# Patient Record
Sex: MALE | Race: WHITE | Employment: UNEMPLOYED | URBAN - METROPOLITAN AREA
[De-identification: names, ages, dates, MRNs, and addresses within clinical notes are randomized per-mention and may not be internally consistent; named-entity substitution may affect disease eponyms.]

---

## 2018-02-20 RX ORDER — OLANZAPINE 2.5 MG/1
2.5 TABLET ORAL NIGHTLY
COMMUNITY
End: 2018-08-21

## 2018-02-20 RX ORDER — LANOLIN ALCOHOL/MO/W.PET/CERES
3 CREAM (GRAM) TOPICAL NIGHTLY PRN
COMMUNITY
End: 2018-08-21

## 2018-02-20 RX ORDER — CHOLECALCIFEROL (VITAMIN D3) 125 MCG
2000 CAPSULE ORAL EVERY MORNING
COMMUNITY
End: 2019-08-16

## 2018-02-20 RX ORDER — DESVENLAFAXINE 100 MG/1
100 TABLET, EXTENDED RELEASE ORAL DAILY
COMMUNITY
End: 2018-08-21

## 2018-02-20 RX ORDER — AMLODIPINE BESYLATE 5 MG/1
5 TABLET ORAL DAILY
Status: ON HOLD | COMMUNITY
End: 2019-01-14 | Stop reason: HOSPADM

## 2018-02-20 RX ORDER — PROMETHAZINE HYDROCHLORIDE 25 MG/1
25 TABLET ORAL EVERY 8 HOURS PRN
COMMUNITY
End: 2018-03-01 | Stop reason: ALTCHOICE

## 2018-02-20 RX ORDER — ACETAMINOPHEN 325 MG/1
650 TABLET ORAL 4 TIMES DAILY
COMMUNITY

## 2018-02-20 RX ORDER — MIRTAZAPINE 15 MG/1
30 TABLET, FILM COATED ORAL NIGHTLY
Status: ON HOLD | COMMUNITY
End: 2019-06-21

## 2018-02-20 RX ORDER — ACETAMINOPHEN 650 MG/1
650 SUPPOSITORY RECTAL EVERY 4 HOURS PRN
Status: ON HOLD | COMMUNITY
End: 2019-01-14 | Stop reason: HOSPADM

## 2018-02-23 ENCOUNTER — OFFICE VISIT (OUTPATIENT)
Dept: GERIATRIC MEDICINE | Age: 63
End: 2018-02-23
Payer: COMMERCIAL

## 2018-02-23 DIAGNOSIS — E11.9 TYPE 2 DIABETES MELLITUS WITHOUT COMPLICATION, UNSPECIFIED LONG TERM INSULIN USE STATUS: ICD-10-CM

## 2018-02-23 DIAGNOSIS — G89.29 OTHER CHRONIC PAIN: Primary | ICD-10-CM

## 2018-02-23 DIAGNOSIS — I10 ESSENTIAL HYPERTENSION: ICD-10-CM

## 2018-02-23 DIAGNOSIS — G62.9 NEUROPATHY: Primary | ICD-10-CM

## 2018-02-23 DIAGNOSIS — R53.1 WEAKNESS: ICD-10-CM

## 2018-02-23 PROCEDURE — 3046F HEMOGLOBIN A1C LEVEL >9.0%: CPT | Performed by: INTERNAL MEDICINE

## 2018-02-23 PROCEDURE — 3017F COLORECTAL CA SCREEN DOC REV: CPT | Performed by: INTERNAL MEDICINE

## 2018-02-23 PROCEDURE — 99305 1ST NF CARE MODERATE MDM 35: CPT | Performed by: INTERNAL MEDICINE

## 2018-02-23 RX ORDER — TRAMADOL HYDROCHLORIDE 50 MG/1
50 TABLET ORAL EVERY 6 HOURS PRN
Qty: 28 TABLET | Refills: 0 | Status: SHIPPED | OUTPATIENT
Start: 2018-02-23 | End: 2018-03-01 | Stop reason: ALTCHOICE

## 2018-02-28 VITALS — DIASTOLIC BLOOD PRESSURE: 60 MMHG | SYSTOLIC BLOOD PRESSURE: 113 MMHG | TEMPERATURE: 97.2 F | HEART RATE: 88 BPM

## 2018-03-01 ENCOUNTER — OFFICE VISIT (OUTPATIENT)
Dept: GERIATRIC MEDICINE | Age: 63
End: 2018-03-01
Payer: COMMERCIAL

## 2018-03-01 DIAGNOSIS — G81.94 LEFT HEMIPLEGIA (HCC): ICD-10-CM

## 2018-03-01 DIAGNOSIS — E55.9 VITAMIN D DEFICIENCY: ICD-10-CM

## 2018-03-01 DIAGNOSIS — M62.838 MUSCLE SPASM: ICD-10-CM

## 2018-03-01 DIAGNOSIS — F32.A ANXIETY AND DEPRESSION: ICD-10-CM

## 2018-03-01 DIAGNOSIS — F41.9 ANXIETY AND DEPRESSION: ICD-10-CM

## 2018-03-01 DIAGNOSIS — R52 UNCONTROLLED PAIN: Primary | ICD-10-CM

## 2018-03-01 PROCEDURE — 99309 SBSQ NF CARE MODERATE MDM 30: CPT | Performed by: NURSE PRACTITIONER

## 2018-03-01 PROCEDURE — 3017F COLORECTAL CA SCREEN DOC REV: CPT | Performed by: NURSE PRACTITIONER

## 2018-03-01 RX ORDER — TIZANIDINE 2 MG/1
2 TABLET ORAL 3 TIMES DAILY
Qty: 21 TABLET | Refills: 0
Start: 2018-03-01 | End: 2018-03-08 | Stop reason: DRUGHIGH

## 2018-03-01 RX ORDER — HYDROCODONE BITARTRATE AND ACETAMINOPHEN 5; 325 MG/1; MG/1
1 TABLET ORAL EVERY 6 HOURS PRN
Qty: 28 TABLET | Refills: 0 | Status: SHIPPED | OUTPATIENT
Start: 2018-03-01 | End: 2018-03-08 | Stop reason: SDUPTHER

## 2018-03-01 ASSESSMENT — ENCOUNTER SYMPTOMS
COUGH: 0
NAUSEA: 0
ABDOMINAL PAIN: 0
CONSTIPATION: 0
SHORTNESS OF BREATH: 0
WHEEZING: 0
BACK PAIN: 1

## 2018-03-01 NOTE — PROGRESS NOTES
[address] [facility]  [address], [address]  P: [phone]   F: [phone]    Subjective  Ayde Natarajan, 63 y.o. male presents today with:  Chief Complaint   Patient presents with    Pain     HPI  Patient is seen today for complaints of uncontrolled generalized pain, worse in the left shoulder left mid upper back and neck, left leg and side. He rates it as an 8 out of 10, aching and stabbing, with spasms and tightness, worse with movement, touch, and therapy. He has tried tramadol, ice, heat, massage, and is currently on gabapentin and tramadol and Tylenol. He states that the tramadol takes the pain to a 7-1/2, which is still not tolerable for him. He states that the pain is keeping him up at night and he is crying from the pain. He has Parkinson's and has had bad spasms in the left side affected by his recent stroke. He states that he did have something for pain in the hospital which was more effective, but he does not remember what it was. He is very anxious and depressed, and states that his wife  in November, he had a stroke in December, and then his mother  in January. He is under the care of psychiatry, and Dr. Elsa Aguirre adjusted his medications yesterday. He is receiving therapies, and is not able to move his left hand or leg, minimal movement in the left shoulder. He has a history of hepatitis C, and vitamin D deficiency, last labs in Epic are from . Review of Systems   Constitutional: Negative for chills, fatigue and fever. Respiratory: Negative for cough, shortness of breath and wheezing. Cardiovascular: Positive for leg swelling (occasional, in ankles). Negative for chest pain and palpitations. Gastrointestinal: Negative for abdominal pain, constipation and nausea. Musculoskeletal: Positive for arthralgias, back pain, gait problem, myalgias and neck pain. Skin: Negative for rash.    Neurological: Positive for tremors, seizures and weakness. Psychiatric/Behavioral: Positive for agitation, behavioral problems and sleep disturbance. The patient is nervous/anxious. Yelled at nursing staff this morning     Past Medical History:   Diagnosis Date    CAD (coronary artery disease)     CVA (cerebral infarction) 2007, 2008    x2    Hemiplegia as late effect of cerebrovascular accident ([facility] [address] 2012    This is heavily debated. MRI shows small vessel CVD and nothing to suggest significant prior stroke to leave hemiplegia. Neurology has suggested malingering. 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[facility] [address]     Pneumonia     Stroke ([facility] [address]     X2     Past Surgical History:   Procedure Laterality Date    BONE RESECTION, RIB      BONY  TUMOR    BONY PELVIS SURGERY  1982    CORONARY ANGIOPLASTY WITH STENT PLACEMENT      X2    CORONARY ANGIOPLASTY WITH STENT PLACEMENT      x2    KIDNEY CYST REMOVAL      benign    KIDNEY SURGERY      TUMOR AND STONE REMOVED     Family History   Problem Relation Age of Onset    Cancer Mother     High Blood Pressure Mother     Stroke Father     Heart Attack Father     Heart Disease Father     High Blood Pressure Father     Diabetes Other      GM     Social History     Social History    Marital status: Single     Spouse name: N/A    Number of children: N/A    Years of education: N/A     Occupational History    Not on file.      Social History Main Topics    Smoking status: Former Smoker     Quit date: 2/20/2006    Smokeless tobacco: Never Used    Alcohol use 0.6 oz/week     1 Cans of beer per week      Comment: occasional    Drug use: No    Sexual activity: Yes     Partners: Female     Other Topics Concern    Not on file     Social History Narrative    ** Merged History Encounter **          Allergies   Allergen Reactions    Pcn CAPS Take 1 tablet by mouth every 7 days. 4 capsule 6    nitroGLYCERIN (NITROSTAT) 0.4 MG SL tablet Place 0.4 mg under the tongue every 5 minutes as needed.  nitroGLYCERIN (MINITRAN) 0.2 MG/HR Place 1 patch onto the skin daily.  pravastatin (PRAVACHOL) 20 MG tablet Take 20 mg by mouth daily.  lisinopril (PRINIVIL;ZESTRIL) 10 MG tablet Take 10 mg by mouth daily.  clopidogrel (PLAVIX) 75 MG tablet Take 75 mg by mouth daily.  nitroGLYCERIN (NITRODUR) 0.2 MG/HR Place 1 patch onto the skin daily.  lisinopril (PRINIVIL;ZESTRIL) 10 MG tablet Take 10 mg by mouth daily.  nitroGLYCERIN (NITROSTAT) 0.4 MG SL tablet Place 0.4 mg under the tongue every 5 minutes as needed.  clopidogrel (PLAVIX) 75 MG tablet Take 75 mg by mouth daily.  pravastatin (PRAVACHOL) 20 MG tablet Take 20 mg by mouth daily.  metoprolol (LOPRESSOR) 50 MG tablet Take 50 mg by mouth 2 times daily. No current facility-administered medications for this visit. Objective  Vitals:    03/01/18 0903   BP: 121/71   Pulse: 75   Resp: 18   Temp: 98 °F (36.7 °C)   SpO2: 96%   Weight: 176 lb (79.8 kg)   Height: 5' 9\" (1.753 m)     Physical Exam   Constitutional: He appears malnourished. He appears unhealthy. No distress. Neck: Spinous process tenderness and muscular tenderness present. Decreased range of motion present. No edema present. Cardiovascular: Normal rate, regular rhythm and normal heart sounds. No murmur heard. No lower extremity edema   Pulmonary/Chest: Effort normal and breath sounds normal. He has no wheezes. Abdominal: Soft. Bowel sounds are normal. There is tenderness. Musculoskeletal:        Left hand: He exhibits decreased range of motion and deformity. Left lower leg: He exhibits tenderness. He exhibits no swelling. Neurological: He is alert. He displays weakness and tremor. He displays normal speech. He exhibits abnormal muscle tone.  Gait

## 2018-03-02 VITALS
RESPIRATION RATE: 18 BRPM | HEART RATE: 75 BPM | WEIGHT: 176 LBS | SYSTOLIC BLOOD PRESSURE: 121 MMHG | BODY MASS INDEX: 26.07 KG/M2 | HEIGHT: 69 IN | OXYGEN SATURATION: 96 % | TEMPERATURE: 98 F | DIASTOLIC BLOOD PRESSURE: 71 MMHG

## 2018-03-08 ENCOUNTER — OFFICE VISIT (OUTPATIENT)
Dept: GERIATRIC MEDICINE | Age: 63
End: 2018-03-08
Payer: COMMERCIAL

## 2018-03-08 DIAGNOSIS — R53.1 GENERALIZED WEAKNESS: ICD-10-CM

## 2018-03-08 DIAGNOSIS — G89.29 OTHER CHRONIC PAIN: ICD-10-CM

## 2018-03-08 DIAGNOSIS — M62.838 MUSCLE SPASM: Primary | ICD-10-CM

## 2018-03-08 PROCEDURE — 3017F COLORECTAL CA SCREEN DOC REV: CPT | Performed by: NURSE PRACTITIONER

## 2018-03-08 PROCEDURE — 99308 SBSQ NF CARE LOW MDM 20: CPT | Performed by: NURSE PRACTITIONER

## 2018-03-08 RX ORDER — TIZANIDINE 4 MG/1
4 TABLET ORAL 3 TIMES DAILY
Qty: 90 TABLET | Refills: 0 | Status: SHIPPED | OUTPATIENT
Start: 2018-03-08 | End: 2018-04-02 | Stop reason: ALTCHOICE

## 2018-03-08 RX ORDER — HYDROCODONE BITARTRATE AND ACETAMINOPHEN 5; 325 MG/1; MG/1
1 TABLET ORAL EVERY 6 HOURS PRN
Qty: 60 TABLET | Refills: 0 | Status: SHIPPED | OUTPATIENT
Start: 2018-03-08 | End: 2018-04-07

## 2018-03-09 VITALS
TEMPERATURE: 99.5 F | HEIGHT: 69 IN | BODY MASS INDEX: 26.36 KG/M2 | DIASTOLIC BLOOD PRESSURE: 95 MMHG | HEART RATE: 102 BPM | RESPIRATION RATE: 18 BRPM | SYSTOLIC BLOOD PRESSURE: 135 MMHG | WEIGHT: 178 LBS | OXYGEN SATURATION: 99 %

## 2018-03-26 ASSESSMENT — ENCOUNTER SYMPTOMS
SHORTNESS OF BREATH: 0
BACK PAIN: 1
COUGH: 0
WHEEZING: 0
CONSTIPATION: 0
ABDOMINAL PAIN: 0

## 2018-04-02 ENCOUNTER — OFFICE VISIT (OUTPATIENT)
Dept: GERIATRIC MEDICINE | Age: 63
End: 2018-04-02
Payer: COMMERCIAL

## 2018-04-02 DIAGNOSIS — R05.8 PRODUCTIVE COUGH: ICD-10-CM

## 2018-04-02 DIAGNOSIS — M62.838 MUSCLE SPASM: Primary | ICD-10-CM

## 2018-04-02 PROCEDURE — 99309 SBSQ NF CARE MODERATE MDM 30: CPT | Performed by: NURSE PRACTITIONER

## 2018-04-02 PROCEDURE — 3017F COLORECTAL CA SCREEN DOC REV: CPT | Performed by: NURSE PRACTITIONER

## 2018-04-03 VITALS
HEIGHT: 69 IN | RESPIRATION RATE: 18 BRPM | WEIGHT: 179 LBS | BODY MASS INDEX: 26.51 KG/M2 | TEMPERATURE: 98.9 F | OXYGEN SATURATION: 98 % | DIASTOLIC BLOOD PRESSURE: 107 MMHG | HEART RATE: 88 BPM | SYSTOLIC BLOOD PRESSURE: 218 MMHG

## 2018-04-03 LAB
ALBUMIN SERPL-MCNC: 3.7 G/DL
ALP BLD-CCNC: 69 U/L
ALT SERPL-CCNC: 25 U/L
ANION GAP SERPL CALCULATED.3IONS-SCNC: NORMAL MMOL/L
AST SERPL-CCNC: 18 U/L
BASOPHILS ABSOLUTE: ABNORMAL /ΜL
BASOPHILS RELATIVE PERCENT: ABNORMAL %
BILIRUB SERPL-MCNC: 0.4 MG/DL (ref 0.1–1.4)
BUN BLDV-MCNC: 8 MG/DL
CALCIUM SERPL-MCNC: 9.2 MG/DL
CHLORIDE BLD-SCNC: 15 MMOL/L
CHOLESTEROL, TOTAL: 206 MG/DL
CHOLESTEROL/HDL RATIO: 3.3
CO2: 30 MMOL/L
CREAT SERPL-MCNC: 0.9 MG/DL
EOSINOPHILS ABSOLUTE: ABNORMAL /ΜL
EOSINOPHILS RELATIVE PERCENT: ABNORMAL %
GFR CALCULATED: NORMAL
GLUCOSE BLD-MCNC: 91 MG/DL
HCT VFR BLD CALC: 40.5 % (ref 41–53)
HDLC SERPL-MCNC: 38 MG/DL (ref 35–70)
HEMOGLOBIN: 13.7 G/DL (ref 13.5–17.5)
LDL CHOLESTEROL CALCULATED: 124 MG/DL (ref 0–160)
LYMPHOCYTES ABSOLUTE: ABNORMAL /ΜL
LYMPHOCYTES RELATIVE PERCENT: ABNORMAL %
MAGNESIUM: 2.2 MG/DL
MCH RBC QN AUTO: 30.5 PG
MCHC RBC AUTO-ENTMCNC: 33.8 G/DL
MCV RBC AUTO: 90.4 FL
MONOCYTES ABSOLUTE: ABNORMAL /ΜL
MONOCYTES RELATIVE PERCENT: ABNORMAL %
NEUTROPHILS ABSOLUTE: ABNORMAL /ΜL
NEUTROPHILS RELATIVE PERCENT: ABNORMAL %
PLATELET # BLD: 179 K/ΜL
PMV BLD AUTO: 9.7 FL
POTASSIUM SERPL-SCNC: 4.5 MMOL/L
RBC # BLD: 4.48 10^6/ΜL
SODIUM BLD-SCNC: 143 MMOL/L
TOTAL PROTEIN: 6.3
TRIGL SERPL-MCNC: 220 MG/DL
TSH SERPL DL<=0.05 MIU/L-ACNC: 1 UIU/ML
VITAMIN D 25-HYDROXY: 40
VITAMIN D2, 25 HYDROXY: NORMAL
VITAMIN D3,25 HYDROXY: NORMAL
VLDLC SERPL CALC-MCNC: 44 MG/DL
WBC # BLD: 4.1 10^3/ML

## 2018-04-09 RX ORDER — CYCLOBENZAPRINE HCL 5 MG
5 TABLET ORAL 3 TIMES DAILY PRN
Qty: 30 TABLET | Refills: 0
Start: 2018-04-02 | End: 2018-05-02

## 2018-04-09 ASSESSMENT — ENCOUNTER SYMPTOMS
BACK PAIN: 1
WHEEZING: 0
SHORTNESS OF BREATH: 0
COUGH: 1
RHINORRHEA: 0
ABDOMINAL PAIN: 0
CONSTIPATION: 0
CHEST TIGHTNESS: 0
SINUS PRESSURE: 0

## 2018-05-17 DIAGNOSIS — G89.29 OTHER CHRONIC PAIN: Primary | ICD-10-CM

## 2018-05-17 RX ORDER — HYDROCODONE BITARTRATE AND ACETAMINOPHEN 5; 325 MG/1; MG/1
1 TABLET ORAL EVERY 6 HOURS PRN
Qty: 60 TABLET | Refills: 0 | Status: SHIPPED | OUTPATIENT
Start: 2018-05-17 | End: 2018-06-16

## 2018-06-22 ENCOUNTER — OFFICE VISIT (OUTPATIENT)
Dept: GERIATRIC MEDICINE | Age: 63
End: 2018-06-22
Payer: COMMERCIAL

## 2018-06-22 DIAGNOSIS — I63.9 CEREBROVASCULAR ACCIDENT (CVA), UNSPECIFIED MECHANISM (HCC): Primary | ICD-10-CM

## 2018-06-22 DIAGNOSIS — G89.4 CHRONIC PAIN SYNDROME: ICD-10-CM

## 2018-06-22 DIAGNOSIS — K59.01 SLOW TRANSIT CONSTIPATION: ICD-10-CM

## 2018-06-22 PROCEDURE — 99308 SBSQ NF CARE LOW MDM 20: CPT | Performed by: NURSE PRACTITIONER

## 2018-06-22 PROCEDURE — 3017F COLORECTAL CA SCREEN DOC REV: CPT | Performed by: NURSE PRACTITIONER

## 2018-06-25 DIAGNOSIS — R52 PAIN: Primary | ICD-10-CM

## 2018-06-25 RX ORDER — HYDROCODONE BITARTRATE AND ACETAMINOPHEN 5; 325 MG/1; MG/1
1 TABLET ORAL EVERY 6 HOURS PRN
Qty: 90 TABLET | Refills: 0 | Status: SHIPPED | OUTPATIENT
Start: 2018-06-25 | End: 2018-07-25

## 2018-07-09 ENCOUNTER — OFFICE VISIT (OUTPATIENT)
Dept: GERIATRIC MEDICINE | Age: 63
End: 2018-07-09
Payer: COMMERCIAL

## 2018-07-09 DIAGNOSIS — M62.830 BACK MUSCLE SPASM: Primary | ICD-10-CM

## 2018-07-09 DIAGNOSIS — M54.9 ACUTE BACK PAIN, UNSPECIFIED BACK LOCATION, UNSPECIFIED BACK PAIN LATERALITY: ICD-10-CM

## 2018-07-09 PROCEDURE — 99308 SBSQ NF CARE LOW MDM 20: CPT | Performed by: FAMILY MEDICINE

## 2018-07-09 PROCEDURE — 3017F COLORECTAL CA SCREEN DOC REV: CPT | Performed by: FAMILY MEDICINE

## 2018-07-17 VITALS
OXYGEN SATURATION: 92 % | SYSTOLIC BLOOD PRESSURE: 124 MMHG | TEMPERATURE: 98.8 F | HEART RATE: 78 BPM | DIASTOLIC BLOOD PRESSURE: 81 MMHG | RESPIRATION RATE: 18 BRPM

## 2018-07-17 NOTE — PROGRESS NOTES
Jes Ramesh : 1955 DOS: 2018     [facility]      CHIEF COMPLAINT: Body pain. SUBJECTIVE: The patient is a 27-year-old male with medical history significant for CAD, hepatitis C, and a history of CVA x2 with left hemiplegia, who endorses sharp pain in the left upper back and right lower back. Pain is sharp, waxes and wanes with no known aggravating factor, but slightly relieved with the use of current pain medication (Norco 5/325 mg). He denies antecedent history of trauma to the area and denies any recent history of falls. No headaches, dizziness, or lightheadedness. His appetite is good and denies having constipation. No cough or wheezing. MEDICATIONS: Current medications have been reviewed. OBJECTIVE: His vital signs include blood pressure of 124/81 mmHg, heart rate is 78 per minute, respiratory rate is 18 per minute, temperature is 98.8 and he is saturating at 92% on room air. He is a pleasant, middle-aged,  male, who is alert and not pale or jaundiced. Lungs are clear to auscultation bilaterally and he exhibits audible heart tones S1 and S2 without pedal edema. There is a vague tenderness upon palpation of the left upper and right lower back. Skin is warm and dry with diffuse padded and protected. ASSESSMENT AND PLAN:  1. Muscle spasm, back: he currently takes cyclobenzaprine 5 mg q.8 hours p.r.n. and we will increase it to 10 mg tablet to be taken q.8 hours p.r.n. and for him to also have ice-pack/cold compress placed q.2 to 5 minutes and about 4 to 5 times in a day. He will continue with his Norco 5/325 regimen. 2.  Acute Pain in the back, unspecified laterality: Due to #1 above; will treat underlying medical condition.          Kady Wise MD  Attending Geriatrician          Electronically Signed By: Chet Cabrera MD on 2018 01:13:21  ______________________________  Chet Cabrera MD  /[id number]  D: 2018 12:52:56  T: 07/09/2018 20:15:15    cc: Gerry Francois [address].

## 2018-07-31 ENCOUNTER — OFFICE VISIT (OUTPATIENT)
Dept: GERIATRIC MEDICINE | Age: 63
End: 2018-07-31
Payer: COMMERCIAL

## 2018-07-31 DIAGNOSIS — M15.9 OSTEOARTHRITIS OF MULTIPLE JOINTS, UNSPECIFIED OSTEOARTHRITIS TYPE: ICD-10-CM

## 2018-07-31 DIAGNOSIS — G20 PARKINSON DISEASE (HCC): Primary | ICD-10-CM

## 2018-07-31 DIAGNOSIS — I10 ESSENTIAL HYPERTENSION: ICD-10-CM

## 2018-07-31 PROCEDURE — 3017F COLORECTAL CA SCREEN DOC REV: CPT | Performed by: INTERNAL MEDICINE

## 2018-07-31 PROCEDURE — 99309 SBSQ NF CARE MODERATE MDM 30: CPT | Performed by: INTERNAL MEDICINE

## 2018-08-17 ENCOUNTER — OFFICE VISIT (OUTPATIENT)
Dept: GERIATRIC MEDICINE | Age: 63
End: 2018-08-17
Payer: COMMERCIAL

## 2018-08-17 DIAGNOSIS — G20 PARKINSON DISEASE (HCC): ICD-10-CM

## 2018-08-17 DIAGNOSIS — I10 ESSENTIAL HYPERTENSION: ICD-10-CM

## 2018-08-17 DIAGNOSIS — F48.2 PSEUDOBULBAR AFFECT: ICD-10-CM

## 2018-08-17 DIAGNOSIS — I63.9 CEREBROVASCULAR ACCIDENT (CVA), UNSPECIFIED MECHANISM (HCC): Primary | ICD-10-CM

## 2018-08-17 PROCEDURE — 3017F COLORECTAL CA SCREEN DOC REV: CPT | Performed by: INTERNAL MEDICINE

## 2018-08-17 PROCEDURE — 99305 1ST NF CARE MODERATE MDM 35: CPT | Performed by: INTERNAL MEDICINE

## 2018-08-21 DIAGNOSIS — G89.4 CHRONIC PAIN DISORDER: ICD-10-CM

## 2018-08-21 DIAGNOSIS — G47.00 INSOMNIA, UNSPECIFIED TYPE: Primary | ICD-10-CM

## 2018-08-21 LAB
ALBUMIN SERPL-MCNC: 4.5 G/DL (ref 3.9–4.9)
ALP BLD-CCNC: 62 U/L (ref 35–104)
ALT SERPL-CCNC: <5 U/L (ref 0–41)
ANION GAP SERPL CALCULATED.3IONS-SCNC: 13 MEQ/L (ref 7–13)
AST SERPL-CCNC: 25 U/L (ref 0–40)
BILIRUB SERPL-MCNC: 0.4 MG/DL (ref 0–1.2)
BILIRUBIN DIRECT: <0.2 MG/DL (ref 0–0.3)
BILIRUBIN, INDIRECT: NORMAL MG/DL (ref 0–0.6)
BUN BLDV-MCNC: 7 MG/DL (ref 8–23)
CALCIUM SERPL-MCNC: 9.3 MG/DL (ref 8.6–10.2)
CHLORIDE BLD-SCNC: 99 MEQ/L (ref 98–107)
CO2: 28 MEQ/L (ref 22–29)
CREAT SERPL-MCNC: 0.62 MG/DL (ref 0.7–1.2)
GFR AFRICAN AMERICAN: >60
GFR NON-AFRICAN AMERICAN: >60
GLUCOSE BLD-MCNC: 91 MG/DL (ref 74–109)
HCT VFR BLD CALC: 41.8 % (ref 42–52)
HEMOGLOBIN: 14 G/DL (ref 14–18)
MCH RBC QN AUTO: 30.4 PG (ref 27–31.3)
MCHC RBC AUTO-ENTMCNC: 33.4 % (ref 33–37)
MCV RBC AUTO: 91.1 FL (ref 80–100)
PDW BLD-RTO: 13.1 % (ref 11.5–14.5)
PLATELET # BLD: 151 K/UL (ref 130–400)
POTASSIUM SERPL-SCNC: 3.9 MEQ/L (ref 3.5–5.1)
RBC # BLD: 4.59 M/UL (ref 4.7–6.1)
SODIUM BLD-SCNC: 140 MEQ/L (ref 132–144)
TOTAL PROTEIN: 7.3 G/DL (ref 6.4–8.1)
WBC # BLD: 5.9 K/UL (ref 4.8–10.8)

## 2018-08-21 RX ORDER — ZOLPIDEM TARTRATE 5 MG/1
5 TABLET ORAL NIGHTLY
COMMUNITY
End: 2018-08-21 | Stop reason: SDUPTHER

## 2018-08-21 RX ORDER — CYCLOBENZAPRINE HCL 10 MG
10 TABLET ORAL 3 TIMES DAILY PRN
COMMUNITY
End: 2019-08-16

## 2018-08-21 RX ORDER — HYDROCODONE BITARTRATE AND ACETAMINOPHEN 5; 325 MG/1; MG/1
1 TABLET ORAL EVERY 6 HOURS PRN
COMMUNITY
End: 2018-08-21 | Stop reason: SDUPTHER

## 2018-08-21 RX ORDER — POLYETHYLENE GLYCOL 3350 17 G/17G
17 POWDER, FOR SOLUTION ORAL DAILY
COMMUNITY
End: 2019-02-13 | Stop reason: ALTCHOICE

## 2018-08-21 RX ORDER — LANOLIN ALCOHOL/MO/W.PET/CERES
1000 CREAM (GRAM) TOPICAL DAILY
COMMUNITY
End: 2019-08-16

## 2018-08-21 RX ORDER — PREGABALIN 50 MG/1
50 CAPSULE ORAL 2 TIMES DAILY
Qty: 60 CAPSULE | Refills: 0 | Status: ON HOLD | OUTPATIENT
Start: 2018-08-21 | End: 2019-01-12

## 2018-08-21 RX ORDER — ZOLPIDEM TARTRATE 5 MG/1
5 TABLET ORAL NIGHTLY
Qty: 30 TABLET | Refills: 0 | Status: SHIPPED | OUTPATIENT
Start: 2018-08-21 | End: 2018-09-20

## 2018-08-21 RX ORDER — LITHIUM CARBONATE 300 MG
150 TABLET ORAL 2 TIMES DAILY
COMMUNITY

## 2018-08-21 RX ORDER — PREGABALIN 50 MG/1
50 CAPSULE ORAL 2 TIMES DAILY
COMMUNITY
End: 2018-08-21 | Stop reason: SDUPTHER

## 2018-08-21 RX ORDER — HYDROCODONE BITARTRATE AND ACETAMINOPHEN 5; 325 MG/1; MG/1
1 TABLET ORAL EVERY 6 HOURS PRN
Qty: 40 TABLET | Refills: 0 | Status: SHIPPED | OUTPATIENT
Start: 2018-08-21 | End: 2018-09-10 | Stop reason: SDUPTHER

## 2018-08-27 VITALS — DIASTOLIC BLOOD PRESSURE: 60 MMHG | HEART RATE: 82 BPM | SYSTOLIC BLOOD PRESSURE: 133 MMHG | TEMPERATURE: 97.1 F

## 2018-08-27 NOTE — PROGRESS NOTES
PATIENT: Mina San : 1955 DOS: 2018     [facility]    Seen for a routine monthly visit for his hypertension, status post CVA, Parkinson's, depression, cognitive impairment. MEDICATIONS:  Reviewed. SUBJECTIVE:  This is a 61-year-old gentleman who was evaluated in his room. The patient was seen by physician earlier for low back pain. The patient had also had recent evidence of urinary tract infection, which was treated with a course of antibiotic therapy. The patient is clinically stable at this time, at his baseline from cognitive standpoint. The patient had been followed by neurology in the past.  The patient has had titration in medication regimen given his worsening symptoms. He has not had any new chest pain or palpitation. REVIEW OF SYSTEMS:  Denies chest pain, nausea, vomiting, headaches. Has ongoing intermittent low back pain. PHYSICAL EXAMINATION:  The patient's vital signs are stable. He was afebrile 97.1, pulse 82, blood pressure 133/60. He is alert and oriented x2, chronic in ill appearance. Pupils are equal and reactive. Oral mucosa is moist.  Chest showed no crackles, no wheezing. Cardiovascular exam showed a regular rate. Abdomen was soft, nontender. Extremities showed a +1 dorsal pedal pulse. No evidence of edema at this time. Skin:  No evidence of rash. ASSESSMENT AND PLAN:  1. Parkinson's:  The patient is at his baseline. Following up with neurology, has had recent titration in medication. Monitoring for cognitive impairment or acute retraction. 2.   Hypertension:  Blood pressure is stable. No orthostasis. 3.   Degenerative joint disease:  No new pain crisis. We will monitor. Please note, the patient is clinically stable at this time.          Electronically Signed By: Maryellen Bah M.D. on 2018 18:17:36  ______________________________  ENEIDA Blue  D: 2018 17:47:21  T: 08/01/2018 00:10:31    cc: - Michel Francois [address].

## 2018-09-10 DIAGNOSIS — G89.4 CHRONIC PAIN DISORDER: ICD-10-CM

## 2018-09-10 RX ORDER — HYDROCODONE BITARTRATE AND ACETAMINOPHEN 5; 325 MG/1; MG/1
1 TABLET ORAL EVERY 6 HOURS PRN
Qty: 40 TABLET | Refills: 0 | Status: SHIPPED | OUTPATIENT
Start: 2018-09-10 | End: 2018-10-10

## 2018-09-18 VITALS
HEART RATE: 83 BPM | SYSTOLIC BLOOD PRESSURE: 128 MMHG | DIASTOLIC BLOOD PRESSURE: 84 MMHG | TEMPERATURE: 97.1 F | OXYGEN SATURATION: 98 %

## 2018-09-18 NOTE — PROGRESS NOTES
rate.  Abdomen was soft. No pulsatile mass noted. No involuntary guarding or rigidity. Extremities showed +1 dorsal pedal pulse. Left upper extremity is in brace. No calf tenderness. No joint effusion. Skin:  No evidence of rash. Lymph:  No axillary or inguinal  lymphadenopathy. ASSESSMENT AND PLAN:  1. Status post CVA:  The patient will continue with stroke rehabilitation as needed, blood pressure control, stat control. 2.   Parkinson's:  The patient is on comprehensive regimen. Following up with neurology. No evidence of acute decline. 3.   Pseudobulbar affect:  Remains on Nuedexta, is currently benefiting from this intervention. We will monitor. 4.   Hypertension:  No orthostasis, is on low-dose intervention. We will monitor closely. Please note, available hospital records, imaging reports, consultant notes, laboratory results are reviewed. CBC, BMP, liver function tests ordered at this time.         Electronically Signed By: Marlene Charles M.D. on 08/29/2018 09:44:48  ______________________________  Marlene Charles M.D.  /[id number]  D: 08/17/2018 25:92:59  T: 08/18/2018 20:31:17    cc: - [address]

## 2018-10-09 DIAGNOSIS — G89.4 CHRONIC PAIN DISORDER: Primary | ICD-10-CM

## 2018-10-09 RX ORDER — ZOLPIDEM TARTRATE 5 MG/1
5 TABLET ORAL NIGHTLY
COMMUNITY
End: 2018-10-18 | Stop reason: SDUPTHER

## 2018-10-17 LAB — LITHIUM LEVEL: 0.7 MEQ/L (ref 0.6–1.2)

## 2018-10-18 DIAGNOSIS — G47.00 INSOMNIA, UNSPECIFIED TYPE: Primary | ICD-10-CM

## 2018-10-19 DIAGNOSIS — G47.00 INSOMNIA, UNSPECIFIED TYPE: ICD-10-CM

## 2018-10-19 RX ORDER — ZOLPIDEM TARTRATE 5 MG/1
5 TABLET ORAL NIGHTLY
Qty: 30 TABLET | Refills: 0 | Status: SHIPPED | OUTPATIENT
Start: 2018-10-19 | End: 2018-10-19 | Stop reason: SDUPTHER

## 2018-10-21 RX ORDER — ZOLPIDEM TARTRATE 5 MG/1
5 TABLET ORAL NIGHTLY
Qty: 30 TABLET | Refills: 0 | Status: SHIPPED | OUTPATIENT
Start: 2018-10-21 | End: 2018-11-19 | Stop reason: SDUPTHER

## 2018-10-26 DIAGNOSIS — G62.9 NEUROPATHY: Primary | ICD-10-CM

## 2018-10-26 RX ORDER — PREGABALIN 50 MG/1
CAPSULE ORAL
COMMUNITY
Start: 2018-07-20 | End: 2018-10-26 | Stop reason: SDUPTHER

## 2018-10-26 RX ORDER — PREGABALIN 50 MG/1
50 CAPSULE ORAL 2 TIMES DAILY
Qty: 60 CAPSULE | Refills: 1 | Status: SHIPPED | OUTPATIENT
Start: 2018-10-26 | End: 2018-10-29 | Stop reason: SDUPTHER

## 2018-10-29 DIAGNOSIS — G62.9 NEUROPATHY: ICD-10-CM

## 2018-11-02 RX ORDER — PREGABALIN 50 MG/1
50 CAPSULE ORAL 2 TIMES DAILY
Qty: 60 CAPSULE | Refills: 1 | Status: ON HOLD | OUTPATIENT
Start: 2018-11-02 | End: 2019-01-12

## 2018-11-16 ENCOUNTER — OFFICE VISIT (OUTPATIENT)
Dept: GERIATRIC MEDICINE | Age: 63
End: 2018-11-16
Payer: COMMERCIAL

## 2018-11-16 DIAGNOSIS — G31.84 MCI (MILD COGNITIVE IMPAIRMENT): ICD-10-CM

## 2018-11-16 DIAGNOSIS — G20 PARKINSON DISEASE (HCC): Primary | ICD-10-CM

## 2018-11-16 DIAGNOSIS — I10 ESSENTIAL HYPERTENSION: ICD-10-CM

## 2018-11-16 PROCEDURE — 3017F COLORECTAL CA SCREEN DOC REV: CPT | Performed by: INTERNAL MEDICINE

## 2018-11-16 PROCEDURE — G8484 FLU IMMUNIZE NO ADMIN: HCPCS | Performed by: INTERNAL MEDICINE

## 2018-11-16 PROCEDURE — 99309 SBSQ NF CARE MODERATE MDM 30: CPT | Performed by: INTERNAL MEDICINE

## 2018-11-19 DIAGNOSIS — G47.00 INSOMNIA, UNSPECIFIED TYPE: ICD-10-CM

## 2018-11-19 DIAGNOSIS — G62.9 NEUROPATHY: Primary | ICD-10-CM

## 2018-11-19 RX ORDER — ZOLPIDEM TARTRATE 5 MG/1
5 TABLET ORAL NIGHTLY
Qty: 30 TABLET | Refills: 0 | Status: SHIPPED | OUTPATIENT
Start: 2018-11-19 | End: 2018-12-19

## 2018-11-19 RX ORDER — PREGABALIN 100 MG/1
100 CAPSULE ORAL 2 TIMES DAILY
Qty: 60 CAPSULE | Refills: 0 | Status: ON HOLD | OUTPATIENT
Start: 2018-11-19 | End: 2019-06-21

## 2018-11-21 NOTE — PROGRESS NOTES
PATIENT: Sameera Covarrubias : 1955 DOS: 2018     [facility]    Seen for routine monthly visit for his Parkinson's with tremor, pseudobulbar effect, cognitive impairment, weakness. MEDICATIONS:  Reviewed. SUBJECTIVE:  This very pleasant 72-year-old gentleman was evaluated in his room. The patient is up, having his dinner. The patient has not had any evidence of acute aspiration. No new chest pain or palpitation. He has not had any new change in his bowel or bladder habits. No new bleeding diathesis. The patient is cognitively at this baseline. No evidence of acute psychosis. No recent headaches, fevers or chills. Pain is well controlled. Mood state has actually improved while here. He is actually adjusting well to the facility. REVIEW OF SYSTEMS:  Denies chest pain, palpitations, nausea, vomiting. OBJECTIVE:  Vital signs were stable. The pupils are small, but they are reactive. Oral mucosa is moist.  No thrush. Neck was supple. Chest showed no crackles, no wheezing. Good air entry in all lung zones. Cardiovascular exam showed a regular rate. Abdomen is soft, nontender. Neurologically, the patient does have resting tremor, minimal dysarthria. ASSESSMENT AND PLAN:  1. Parkinson's:  The patient is clinically stable, has been on Sinemet, is on Remeron, is on Nuedexta. 2.   Cognitive impairment:  The patient is at her psychosis. No evidence of acute psychosis. 3.   Hypertension:  Blood pressure is stable. No orthostasis. Continue with calcium channel blocker. We will monitor.         Electronically Signed By: Gunnar Ndiaye M.D. on 2018 12:54:07  ______________________________  Jilda Double, M.D. Kellie.Lefort  D: 2018 18:00:54  T: 2018 12:54:53    cc: [facility]

## 2018-12-10 LAB — LITHIUM LEVEL: 0.7 MEQ/L (ref 0.6–1.2)

## 2019-01-09 ENCOUNTER — HOSPITAL ENCOUNTER (EMERGENCY)
Age: 64
Discharge: HOME | DRG: 469 | End: 2019-01-09
Attending: EMERGENCY MEDICINE
Payer: COMMERCIAL

## 2019-01-09 ENCOUNTER — APPOINTMENT (OUTPATIENT)
Dept: GENERAL RADIOLOGY | Age: 64
DRG: 469 | End: 2019-01-09
Payer: COMMERCIAL

## 2019-01-09 ENCOUNTER — APPOINTMENT (OUTPATIENT)
Dept: CT IMAGING | Age: 64
DRG: 469 | End: 2019-01-09
Payer: COMMERCIAL

## 2019-01-09 VITALS
TEMPERATURE: 98.2 F | BODY MASS INDEX: 26.51 KG/M2 | OXYGEN SATURATION: 99 % | HEART RATE: 63 BPM | HEIGHT: 73 IN | SYSTOLIC BLOOD PRESSURE: 114 MMHG | RESPIRATION RATE: 18 BRPM | DIASTOLIC BLOOD PRESSURE: 78 MMHG | WEIGHT: 200 LBS

## 2019-01-09 DIAGNOSIS — S09.90XA CLOSED HEAD INJURY, INITIAL ENCOUNTER: Primary | ICD-10-CM

## 2019-01-09 DIAGNOSIS — S01.81XA LACERATION OF FOREHEAD, INITIAL ENCOUNTER: ICD-10-CM

## 2019-01-09 DIAGNOSIS — S40.012A CONTUSION OF LEFT SHOULDER, INITIAL ENCOUNTER: ICD-10-CM

## 2019-01-09 PROCEDURE — 99284 EMERGENCY DEPT VISIT MOD MDM: CPT

## 2019-01-09 PROCEDURE — 6360000002 HC RX W HCPCS: Performed by: EMERGENCY MEDICINE

## 2019-01-09 PROCEDURE — 6370000000 HC RX 637 (ALT 250 FOR IP): Performed by: EMERGENCY MEDICINE

## 2019-01-09 PROCEDURE — 72125 CT NECK SPINE W/O DYE: CPT

## 2019-01-09 PROCEDURE — 70450 CT HEAD/BRAIN W/O DYE: CPT

## 2019-01-09 PROCEDURE — 73030 X-RAY EXAM OF SHOULDER: CPT

## 2019-01-09 PROCEDURE — 90715 TDAP VACCINE 7 YRS/> IM: CPT | Performed by: EMERGENCY MEDICINE

## 2019-01-09 PROCEDURE — 90471 IMMUNIZATION ADMIN: CPT | Performed by: EMERGENCY MEDICINE

## 2019-01-09 RX ORDER — CYCLOBENZAPRINE HCL 10 MG
10 TABLET ORAL ONCE
Status: COMPLETED | OUTPATIENT
Start: 2019-01-09 | End: 2019-01-09

## 2019-01-09 RX ADMIN — Medication 1 EACH: at 10:23

## 2019-01-09 RX ADMIN — CYCLOBENZAPRINE HYDROCHLORIDE 10 MG: 10 TABLET, FILM COATED ORAL at 08:53

## 2019-01-09 RX ADMIN — TETANUS TOXOID, REDUCED DIPHTHERIA TOXOID AND ACELLULAR PERTUSSIS VACCINE, ADSORBED 0.5 ML: 5; 2.5; 8; 8; 2.5 SUSPENSION INTRAMUSCULAR at 08:48

## 2019-01-09 ASSESSMENT — ENCOUNTER SYMPTOMS
RHINORRHEA: 0
VOMITING: 0
TROUBLE SWALLOWING: 0
NAUSEA: 0
ABDOMINAL PAIN: 0
EYES NEGATIVE: 1
ALLERGIC/IMMUNOLOGIC NEGATIVE: 1
SHORTNESS OF BREATH: 0
WHEEZING: 0

## 2019-01-09 ASSESSMENT — PAIN DESCRIPTION - LOCATION: LOCATION: SHOULDER;HEAD;NECK

## 2019-01-09 ASSESSMENT — PAIN DESCRIPTION - ORIENTATION: ORIENTATION: LEFT

## 2019-01-09 ASSESSMENT — PAIN DESCRIPTION - PAIN TYPE: TYPE: ACUTE PAIN

## 2019-01-09 ASSESSMENT — PAIN SCALES - GENERAL: PAINLEVEL_OUTOF10: 8

## 2019-01-11 ENCOUNTER — APPOINTMENT (OUTPATIENT)
Dept: CT IMAGING | Age: 64
DRG: 469 | End: 2019-01-11
Payer: COMMERCIAL

## 2019-01-11 ENCOUNTER — APPOINTMENT (OUTPATIENT)
Dept: GENERAL RADIOLOGY | Age: 64
DRG: 469 | End: 2019-01-11
Payer: COMMERCIAL

## 2019-01-11 ENCOUNTER — HOSPITAL ENCOUNTER (INPATIENT)
Age: 64
LOS: 3 days | Discharge: NURSING, REHAB OR LONG-TERM CARE | DRG: 469 | End: 2019-01-14
Attending: EMERGENCY MEDICINE | Admitting: INTERNAL MEDICINE
Payer: COMMERCIAL

## 2019-01-11 DIAGNOSIS — J18.9 HCAP (HEALTHCARE-ASSOCIATED PNEUMONIA): ICD-10-CM

## 2019-01-11 DIAGNOSIS — J18.9 PNEUMONIA DUE TO ORGANISM: ICD-10-CM

## 2019-01-11 DIAGNOSIS — R41.82 ALTERED MENTAL STATUS, UNSPECIFIED ALTERED MENTAL STATUS TYPE: Primary | ICD-10-CM

## 2019-01-11 DIAGNOSIS — N30.00 ACUTE CYSTITIS WITHOUT HEMATURIA: ICD-10-CM

## 2019-01-11 LAB
ALBUMIN SERPL-MCNC: 4.1 G/DL (ref 3.9–4.9)
ALP BLD-CCNC: 64 U/L (ref 35–104)
ALT SERPL-CCNC: <5 U/L (ref 0–41)
ANION GAP SERPL CALCULATED.3IONS-SCNC: 12 MEQ/L (ref 7–13)
AST SERPL-CCNC: 10 U/L (ref 0–40)
BACTERIA: ABNORMAL /HPF
BASOPHILS ABSOLUTE: 0 K/UL (ref 0–0.2)
BASOPHILS RELATIVE PERCENT: 0.6 %
BILIRUB SERPL-MCNC: 0.5 MG/DL (ref 0–1.2)
BILIRUBIN URINE: NEGATIVE
BLOOD, URINE: NEGATIVE
BUN BLDV-MCNC: 35 MG/DL (ref 8–23)
CALCIUM SERPL-MCNC: 9.1 MG/DL (ref 8.6–10.2)
CHLORIDE BLD-SCNC: 103 MEQ/L (ref 98–107)
CLARITY: CLEAR
CO2: 19 MEQ/L (ref 22–29)
COLOR: YELLOW
CREAT SERPL-MCNC: 1.7 MG/DL (ref 0.7–1.2)
EOSINOPHILS ABSOLUTE: 0.5 K/UL (ref 0–0.7)
EOSINOPHILS RELATIVE PERCENT: 6.3 %
EPITHELIAL CELLS, UA: ABNORMAL /HPF (ref 0–5)
GFR AFRICAN AMERICAN: 49.8
GFR NON-AFRICAN AMERICAN: 41.1
GLOBULIN: 3.3 G/DL (ref 2.3–3.5)
GLUCOSE BLD-MCNC: 93 MG/DL (ref 74–109)
GLUCOSE URINE: NEGATIVE MG/DL
HCT VFR BLD CALC: 40.3 % (ref 42–52)
HEMOGLOBIN: 14.1 G/DL (ref 14–18)
HYALINE CASTS: ABNORMAL /HPF (ref 0–5)
INR BLD: 1
KETONES, URINE: NEGATIVE MG/DL
LACTIC ACID: 1.1 MMOL/L (ref 0.5–2.2)
LEUKOCYTE ESTERASE, URINE: ABNORMAL
LYMPHOCYTES ABSOLUTE: 0.9 K/UL (ref 1–4.8)
LYMPHOCYTES RELATIVE PERCENT: 11.6 %
MAGNESIUM: 2.9 MG/DL (ref 1.7–2.3)
MCH RBC QN AUTO: 30.6 PG (ref 27–31.3)
MCHC RBC AUTO-ENTMCNC: 34.9 % (ref 33–37)
MCV RBC AUTO: 87.6 FL (ref 80–100)
MONOCYTES ABSOLUTE: 0.6 K/UL (ref 0.2–0.8)
MONOCYTES RELATIVE PERCENT: 7 %
NEUTROPHILS ABSOLUTE: 6 K/UL (ref 1.4–6.5)
NEUTROPHILS RELATIVE PERCENT: 74.5 %
NITRITE, URINE: NEGATIVE
PDW BLD-RTO: 13.5 % (ref 11.5–14.5)
PH UA: 5.5 (ref 5–9)
PLATELET # BLD: 170 K/UL (ref 130–400)
PLATELET SLIDE REVIEW: ADEQUATE
POTASSIUM SERPL-SCNC: 5.2 MEQ/L (ref 3.5–5.1)
PROTEIN UA: NEGATIVE MG/DL
PROTHROMBIN TIME: 10.2 SEC (ref 9–11.5)
RBC # BLD: 4.6 M/UL (ref 4.7–6.1)
RBC UA: ABNORMAL /HPF (ref 0–2)
REASON FOR REJECTION: NORMAL
REJECTED TEST: NORMAL
SODIUM BLD-SCNC: 134 MEQ/L (ref 132–144)
SPECIFIC GRAVITY UA: 1.01 (ref 1–1.03)
TOTAL PROTEIN: 7.4 G/DL (ref 6.4–8.1)
TROPONIN: <0.01 NG/ML (ref 0–0.01)
URINE REFLEX TO CULTURE: YES
UROBILINOGEN, URINE: 0.2 E.U./DL
WBC # BLD: 8.1 K/UL (ref 4.8–10.8)
WBC UA: ABNORMAL /HPF (ref 0–5)

## 2019-01-11 PROCEDURE — 71045 X-RAY EXAM CHEST 1 VIEW: CPT

## 2019-01-11 PROCEDURE — 96365 THER/PROPH/DIAG IV INF INIT: CPT

## 2019-01-11 PROCEDURE — 36415 COLL VENOUS BLD VENIPUNCTURE: CPT

## 2019-01-11 PROCEDURE — 81001 URINALYSIS AUTO W/SCOPE: CPT

## 2019-01-11 PROCEDURE — 6360000002 HC RX W HCPCS: Performed by: EMERGENCY MEDICINE

## 2019-01-11 PROCEDURE — 74176 CT ABD & PELVIS W/O CONTRAST: CPT

## 2019-01-11 PROCEDURE — 99285 EMERGENCY DEPT VISIT HI MDM: CPT

## 2019-01-11 PROCEDURE — 96375 TX/PRO/DX INJ NEW DRUG ADDON: CPT

## 2019-01-11 PROCEDURE — 70450 CT HEAD/BRAIN W/O DYE: CPT

## 2019-01-11 PROCEDURE — 1210000000 HC MED SURG R&B

## 2019-01-11 PROCEDURE — 6370000000 HC RX 637 (ALT 250 FOR IP): Performed by: EMERGENCY MEDICINE

## 2019-01-11 PROCEDURE — 85025 COMPLETE CBC W/AUTO DIFF WBC: CPT

## 2019-01-11 PROCEDURE — 84484 ASSAY OF TROPONIN QUANT: CPT

## 2019-01-11 PROCEDURE — 87040 BLOOD CULTURE FOR BACTERIA: CPT

## 2019-01-11 PROCEDURE — 83605 ASSAY OF LACTIC ACID: CPT

## 2019-01-11 PROCEDURE — 93005 ELECTROCARDIOGRAM TRACING: CPT

## 2019-01-11 PROCEDURE — 80053 COMPREHEN METABOLIC PANEL: CPT

## 2019-01-11 PROCEDURE — 2580000003 HC RX 258: Performed by: EMERGENCY MEDICINE

## 2019-01-11 PROCEDURE — 85610 PROTHROMBIN TIME: CPT

## 2019-01-11 PROCEDURE — 83735 ASSAY OF MAGNESIUM: CPT

## 2019-01-11 RX ORDER — MIRTAZAPINE 30 MG/1
30 TABLET, FILM COATED ORAL NIGHTLY
Status: DISCONTINUED | OUTPATIENT
Start: 2019-01-11 | End: 2019-01-14 | Stop reason: HOSPADM

## 2019-01-11 RX ORDER — PROCHLORPERAZINE MALEATE 10 MG
10 TABLET ORAL EVERY 6 HOURS PRN
Status: DISCONTINUED | OUTPATIENT
Start: 2019-01-11 | End: 2019-01-14 | Stop reason: HOSPADM

## 2019-01-11 RX ORDER — PREGABALIN 100 MG/1
100 CAPSULE ORAL 2 TIMES DAILY
Status: DISCONTINUED | OUTPATIENT
Start: 2019-01-11 | End: 2019-01-14 | Stop reason: HOSPADM

## 2019-01-11 RX ORDER — 0.9 % SODIUM CHLORIDE 0.9 %
500 INTRAVENOUS SOLUTION INTRAVENOUS ONCE
Status: COMPLETED | OUTPATIENT
Start: 2019-01-11 | End: 2019-01-11

## 2019-01-11 RX ORDER — POLYETHYLENE GLYCOL 3350 17 G/17G
17 POWDER, FOR SOLUTION ORAL DAILY
Status: DISCONTINUED | OUTPATIENT
Start: 2019-01-12 | End: 2019-01-14 | Stop reason: HOSPADM

## 2019-01-11 RX ORDER — THIAMINE MONONITRATE (VIT B1) 100 MG
100 TABLET ORAL EVERY MORNING
Status: DISCONTINUED | OUTPATIENT
Start: 2019-01-12 | End: 2019-01-14 | Stop reason: HOSPADM

## 2019-01-11 RX ORDER — CYCLOBENZAPRINE HCL 10 MG
10 TABLET ORAL 3 TIMES DAILY PRN
Status: DISCONTINUED | OUTPATIENT
Start: 2019-01-11 | End: 2019-01-14 | Stop reason: HOSPADM

## 2019-01-11 RX ORDER — CARBIDOPA AND LEVODOPA 25; 100 MG/1; MG/1
1 TABLET, EXTENDED RELEASE ORAL 2 TIMES DAILY
Status: DISCONTINUED | OUTPATIENT
Start: 2019-01-11 | End: 2019-01-12

## 2019-01-11 RX ORDER — ONDANSETRON 2 MG/ML
4 INJECTION INTRAMUSCULAR; INTRAVENOUS ONCE
Status: COMPLETED | OUTPATIENT
Start: 2019-01-11 | End: 2019-01-11

## 2019-01-11 RX ORDER — CHOLECALCIFEROL (VITAMIN D3) 125 MCG
1000 CAPSULE ORAL DAILY
Status: DISCONTINUED | OUTPATIENT
Start: 2019-01-12 | End: 2019-01-14 | Stop reason: HOSPADM

## 2019-01-11 RX ORDER — ACETAMINOPHEN 325 MG/1
650 TABLET ORAL 4 TIMES DAILY
Status: DISCONTINUED | OUTPATIENT
Start: 2019-01-11 | End: 2019-01-14 | Stop reason: HOSPADM

## 2019-01-11 RX ORDER — 0.9 % SODIUM CHLORIDE 0.9 %
1000 INTRAVENOUS SOLUTION INTRAVENOUS ONCE
Status: COMPLETED | OUTPATIENT
Start: 2019-01-11 | End: 2019-01-11

## 2019-01-11 RX ADMIN — ONDANSETRON 4 MG: 2 INJECTION INTRAMUSCULAR; INTRAVENOUS at 21:24

## 2019-01-11 RX ADMIN — SODIUM CHLORIDE 500 ML: 9 INJECTION, SOLUTION INTRAVENOUS at 20:40

## 2019-01-11 RX ADMIN — CARBIDOPA AND LEVODOPA 1 TABLET: 25; 100 TABLET, EXTENDED RELEASE ORAL at 22:09

## 2019-01-11 RX ADMIN — SODIUM CHLORIDE 1000 ML: 9 INJECTION, SOLUTION INTRAVENOUS at 17:46

## 2019-01-11 RX ADMIN — CEFTRIAXONE SODIUM 1 G: 1 INJECTION, POWDER, FOR SOLUTION INTRAMUSCULAR; INTRAVENOUS at 21:01

## 2019-01-11 RX ADMIN — AZITHROMYCIN MONOHYDRATE 500 MG: 500 INJECTION, POWDER, LYOPHILIZED, FOR SOLUTION INTRAVENOUS at 21:29

## 2019-01-11 RX ADMIN — HYDROMORPHONE HYDROCHLORIDE 0.5 MG: 1 INJECTION, SOLUTION INTRAMUSCULAR; INTRAVENOUS; SUBCUTANEOUS at 21:26

## 2019-01-11 ASSESSMENT — PAIN DESCRIPTION - PAIN TYPE: TYPE: ACUTE PAIN

## 2019-01-11 ASSESSMENT — ENCOUNTER SYMPTOMS
ABDOMINAL PAIN: 1
VOMITING: 0
NAUSEA: 0
EYE PAIN: 0
CHEST TIGHTNESS: 0
SHORTNESS OF BREATH: 0
SORE THROAT: 0

## 2019-01-11 ASSESSMENT — PAIN SCALES - GENERAL
PAINLEVEL_OUTOF10: 8
PAINLEVEL_OUTOF10: 9

## 2019-01-11 ASSESSMENT — PAIN DESCRIPTION - LOCATION: LOCATION: ABDOMEN;HEAD

## 2019-01-11 ASSESSMENT — PAIN DESCRIPTION - DESCRIPTORS: DESCRIPTORS: ACHING

## 2019-01-11 ASSESSMENT — PAIN DESCRIPTION - ORIENTATION: ORIENTATION: LEFT;MID

## 2019-01-11 ASSESSMENT — PAIN DESCRIPTION - FREQUENCY: FREQUENCY: CONTINUOUS

## 2019-01-12 LAB
ALBUMIN SERPL-MCNC: 4.1 G/DL (ref 3.9–4.9)
ANION GAP SERPL CALCULATED.3IONS-SCNC: 9 MEQ/L (ref 7–13)
BUN BLDV-MCNC: 25 MG/DL (ref 8–23)
C-REACTIVE PROTEIN, HIGH SENSITIVITY: 18.5 MG/L (ref 0–5)
CALCIUM SERPL-MCNC: 9.5 MG/DL (ref 8.6–10.2)
CHLORIDE BLD-SCNC: 103 MEQ/L (ref 98–107)
CO2: 26 MEQ/L (ref 22–29)
CREAT SERPL-MCNC: 0.84 MG/DL (ref 0.7–1.2)
EKG ATRIAL RATE: 69 BPM
EKG ATRIAL RATE: 97 BPM
EKG P AXIS: 29 DEGREES
EKG P AXIS: 65 DEGREES
EKG P-R INTERVAL: 172 MS
EKG P-R INTERVAL: 184 MS
EKG Q-T INTERVAL: 370 MS
EKG Q-T INTERVAL: 410 MS
EKG QRS DURATION: 62 MS
EKG QRS DURATION: 88 MS
EKG QTC CALCULATION (BAZETT): 439 MS
EKG QTC CALCULATION (BAZETT): 469 MS
EKG R AXIS: 11 DEGREES
EKG R AXIS: 18 DEGREES
EKG T AXIS: 24 DEGREES
EKG T AXIS: 44 DEGREES
EKG VENTRICULAR RATE: 69 BPM
EKG VENTRICULAR RATE: 97 BPM
FOLATE: 15.8 NG/ML (ref 7.3–26.1)
GFR AFRICAN AMERICAN: >60
GFR NON-AFRICAN AMERICAN: >60
GLUCOSE BLD-MCNC: 116 MG/DL (ref 60–115)
GLUCOSE BLD-MCNC: 128 MG/DL (ref 74–109)
HCT VFR BLD CALC: 38 % (ref 42–52)
HEMOGLOBIN: 12.6 G/DL (ref 14–18)
LITHIUM LEVEL: 1.6 MEQ/L (ref 0.6–1.2)
MAGNESIUM: 2.5 MG/DL (ref 1.7–2.3)
MCH RBC QN AUTO: 29.5 PG (ref 27–31.3)
MCHC RBC AUTO-ENTMCNC: 33.2 % (ref 33–37)
MCV RBC AUTO: 88.7 FL (ref 80–100)
PDW BLD-RTO: 13.5 % (ref 11.5–14.5)
PERFORMED ON: ABNORMAL
PHOSPHORUS: 2.8 MG/DL (ref 2.5–4.5)
PLATELET # BLD: 178 K/UL (ref 130–400)
POTASSIUM SERPL-SCNC: 5.1 MEQ/L (ref 3.5–5.1)
POTASSIUM SERPL-SCNC: 5.6 MEQ/L (ref 3.5–5.1)
POTASSIUM SERPL-SCNC: 6.1 MEQ/L (ref 3.5–5.1)
PROCALCITONIN: 0.18 NG/ML (ref 0–0.15)
RBC # BLD: 4.29 M/UL (ref 4.7–6.1)
SODIUM BLD-SCNC: 138 MEQ/L (ref 132–144)
TSH SERPL DL<=0.05 MIU/L-ACNC: 1 UIU/ML (ref 0.27–4.2)
VITAMIN B-12: 1546 PG/ML (ref 232–1245)
WBC # BLD: 9.8 K/UL (ref 4.8–10.8)

## 2019-01-12 PROCEDURE — 84443 ASSAY THYROID STIM HORMONE: CPT

## 2019-01-12 PROCEDURE — 84145 PROCALCITONIN (PCT): CPT

## 2019-01-12 PROCEDURE — 93005 ELECTROCARDIOGRAM TRACING: CPT

## 2019-01-12 PROCEDURE — 82746 ASSAY OF FOLIC ACID SERUM: CPT

## 2019-01-12 PROCEDURE — 2700000000 HC OXYGEN THERAPY PER DAY

## 2019-01-12 PROCEDURE — G8997 SWALLOW GOAL STATUS: HCPCS

## 2019-01-12 PROCEDURE — 80178 ASSAY OF LITHIUM: CPT

## 2019-01-12 PROCEDURE — 99223 1ST HOSP IP/OBS HIGH 75: CPT | Performed by: INTERNAL MEDICINE

## 2019-01-12 PROCEDURE — 2580000003 HC RX 258: Performed by: INTERNAL MEDICINE

## 2019-01-12 PROCEDURE — 82306 VITAMIN D 25 HYDROXY: CPT

## 2019-01-12 PROCEDURE — 36415 COLL VENOUS BLD VENIPUNCTURE: CPT

## 2019-01-12 PROCEDURE — 80069 RENAL FUNCTION PANEL: CPT

## 2019-01-12 PROCEDURE — 6370000000 HC RX 637 (ALT 250 FOR IP): Performed by: SPECIALIST

## 2019-01-12 PROCEDURE — 86141 C-REACTIVE PROTEIN HS: CPT

## 2019-01-12 PROCEDURE — 2500000003 HC RX 250 WO HCPCS: Performed by: INTERNAL MEDICINE

## 2019-01-12 PROCEDURE — 1210000000 HC MED SURG R&B

## 2019-01-12 PROCEDURE — G8996 SWALLOW CURRENT STATUS: HCPCS

## 2019-01-12 PROCEDURE — 85027 COMPLETE CBC AUTOMATED: CPT

## 2019-01-12 PROCEDURE — 84132 ASSAY OF SERUM POTASSIUM: CPT

## 2019-01-12 PROCEDURE — 92610 EVALUATE SWALLOWING FUNCTION: CPT

## 2019-01-12 PROCEDURE — 6370000000 HC RX 637 (ALT 250 FOR IP): Performed by: INTERNAL MEDICINE

## 2019-01-12 PROCEDURE — 6360000002 HC RX W HCPCS: Performed by: INTERNAL MEDICINE

## 2019-01-12 PROCEDURE — 83735 ASSAY OF MAGNESIUM: CPT

## 2019-01-12 PROCEDURE — 51702 INSERT TEMP BLADDER CATH: CPT

## 2019-01-12 PROCEDURE — 82607 VITAMIN B-12: CPT

## 2019-01-12 RX ORDER — SODIUM CHLORIDE 9 MG/ML
INJECTION, SOLUTION INTRAVENOUS CONTINUOUS
Status: DISCONTINUED | OUTPATIENT
Start: 2019-01-12 | End: 2019-01-14 | Stop reason: HOSPADM

## 2019-01-12 RX ORDER — ASPIRIN AND DIPYRIDAMOLE 25; 200 MG/1; MG/1
1 CAPSULE, EXTENDED RELEASE ORAL 2 TIMES DAILY
Status: DISCONTINUED | OUTPATIENT
Start: 2019-01-12 | End: 2019-01-14 | Stop reason: HOSPADM

## 2019-01-12 RX ORDER — CARBIDOPA AND LEVODOPA 50; 200 MG/1; MG/1
1 TABLET, EXTENDED RELEASE ORAL
Status: DISCONTINUED | OUTPATIENT
Start: 2019-01-12 | End: 2019-01-14 | Stop reason: HOSPADM

## 2019-01-12 RX ORDER — SODIUM POLYSTYRENE SULFONATE 15 G/60ML
15 SUSPENSION ORAL; RECTAL ONCE
Status: COMPLETED | OUTPATIENT
Start: 2019-01-12 | End: 2019-01-12

## 2019-01-12 RX ADMIN — VANCOMYCIN 1000 MG: 1 INJECTION, SOLUTION INTRAVENOUS at 22:56

## 2019-01-12 RX ADMIN — ACETAMINOPHEN 650 MG: 325 TABLET ORAL at 22:50

## 2019-01-12 RX ADMIN — Medication 100 MG: at 11:32

## 2019-01-12 RX ADMIN — DEXTROSE MONOHYDRATE 1 G: 5 INJECTION INTRAVENOUS at 01:18

## 2019-01-12 RX ADMIN — CARBIDOPA AND LEVODOPA 1 TABLET: 25; 100 TABLET ORAL at 00:14

## 2019-01-12 RX ADMIN — SODIUM POLYSTYRENE SULFONATE 15 G: 15 SUSPENSION ORAL; RECTAL at 17:36

## 2019-01-12 RX ADMIN — PREGABALIN 100 MG: 100 CAPSULE ORAL at 00:13

## 2019-01-12 RX ADMIN — CARBIDOPA AND LEVODOPA 1 TABLET: 25; 100 TABLET ORAL at 15:37

## 2019-01-12 RX ADMIN — CARBIDOPA AND LEVODOPA 1 TABLET: 25; 100 TABLET ORAL at 22:50

## 2019-01-12 RX ADMIN — CYANOCOBALAMIN TAB 500 MCG 1000 MCG: 500 TAB at 11:33

## 2019-01-12 RX ADMIN — ASPIRIN AND EXTENDED-RELEASE DIPYRIDAMOLE 1 CAPSULE: 25; 200 CAPSULE ORAL at 22:56

## 2019-01-12 RX ADMIN — DEXTROSE MONOHYDRATE 1 G: 5 INJECTION INTRAVENOUS at 13:39

## 2019-01-12 RX ADMIN — ACETAMINOPHEN 650 MG: 325 TABLET ORAL at 00:13

## 2019-01-12 RX ADMIN — PREGABALIN 100 MG: 100 CAPSULE ORAL at 11:33

## 2019-01-12 RX ADMIN — ACETAMINOPHEN 650 MG: 325 TABLET ORAL at 11:33

## 2019-01-12 RX ADMIN — VANCOMYCIN 1000 MG: 1 INJECTION, SOLUTION INTRAVENOUS at 00:12

## 2019-01-12 RX ADMIN — SODIUM CHLORIDE: 9 INJECTION, SOLUTION INTRAVENOUS at 15:38

## 2019-01-12 RX ADMIN — VITAMIN D, TAB 1000IU (100/BT) 2000 UNITS: 25 TAB at 11:33

## 2019-01-12 RX ADMIN — ACETAMINOPHEN 650 MG: 325 TABLET ORAL at 17:25

## 2019-01-12 RX ADMIN — CARBIDOPA AND LEVODOPA 1 TABLET: 25; 100 TABLET ORAL at 11:33

## 2019-01-12 RX ADMIN — ROTIGOTINE 1 PATCH: 2 PATCH, EXTENDED RELEASE TRANSDERMAL at 11:34

## 2019-01-12 RX ADMIN — MEROPENEM 1 G: 1 INJECTION, POWDER, FOR SOLUTION INTRAVENOUS at 20:28

## 2019-01-12 RX ADMIN — MIRTAZAPINE 30 MG: 30 TABLET, FILM COATED ORAL at 00:13

## 2019-01-12 ASSESSMENT — PAIN - FUNCTIONAL ASSESSMENT: PAIN_FUNCTIONAL_ASSESSMENT: 0-10

## 2019-01-12 ASSESSMENT — PAIN DESCRIPTION - PAIN TYPE: TYPE: ACUTE PAIN

## 2019-01-12 ASSESSMENT — PAIN SCALES - GENERAL
PAINLEVEL_OUTOF10: 0
PAINLEVEL_OUTOF10: 3
PAINLEVEL_OUTOF10: 8
PAINLEVEL_OUTOF10: 3
PAINLEVEL_OUTOF10: 4
PAINLEVEL_OUTOF10: 1

## 2019-01-12 ASSESSMENT — PAIN DESCRIPTION - LOCATION: LOCATION: BACK

## 2019-01-13 ENCOUNTER — APPOINTMENT (OUTPATIENT)
Dept: MRI IMAGING | Age: 64
DRG: 469 | End: 2019-01-13
Payer: COMMERCIAL

## 2019-01-13 ENCOUNTER — APPOINTMENT (OUTPATIENT)
Dept: ULTRASOUND IMAGING | Age: 64
DRG: 469 | End: 2019-01-13
Payer: COMMERCIAL

## 2019-01-13 LAB
ALBUMIN SERPL-MCNC: 4.3 G/DL (ref 3.9–4.9)
ANION GAP SERPL CALCULATED.3IONS-SCNC: 15 MEQ/L (ref 7–13)
BUN BLDV-MCNC: 16 MG/DL (ref 8–23)
CALCIUM SERPL-MCNC: 9.8 MG/DL (ref 8.6–10.2)
CHLORIDE BLD-SCNC: 106 MEQ/L (ref 98–107)
CO2: 20 MEQ/L (ref 22–29)
CREAT SERPL-MCNC: 0.71 MG/DL (ref 0.7–1.2)
GFR AFRICAN AMERICAN: >60
GFR NON-AFRICAN AMERICAN: >60
GLUCOSE BLD-MCNC: 105 MG/DL (ref 74–109)
HCT VFR BLD CALC: 40.1 % (ref 42–52)
HEMOGLOBIN: 13.3 G/DL (ref 14–18)
LITHIUM LEVEL: 1.2 MEQ/L (ref 0.6–1.2)
MAGNESIUM: 2.2 MG/DL (ref 1.7–2.3)
MCH RBC QN AUTO: 29.5 PG (ref 27–31.3)
MCHC RBC AUTO-ENTMCNC: 33.2 % (ref 33–37)
MCV RBC AUTO: 88.8 FL (ref 80–100)
PDW BLD-RTO: 13.7 % (ref 11.5–14.5)
PHOSPHORUS: 3 MG/DL (ref 2.5–4.5)
PLATELET # BLD: 196 K/UL (ref 130–400)
POTASSIUM SERPL-SCNC: 4.9 MEQ/L (ref 3.5–5.1)
RBC # BLD: 4.52 M/UL (ref 4.7–6.1)
SODIUM BLD-SCNC: 141 MEQ/L (ref 132–144)
VITAMIN D 25-HYDROXY: 23.7 NG/ML (ref 30–100)
WBC # BLD: 11 K/UL (ref 4.8–10.8)

## 2019-01-13 PROCEDURE — 6370000000 HC RX 637 (ALT 250 FOR IP): Performed by: SPECIALIST

## 2019-01-13 PROCEDURE — 6370000000 HC RX 637 (ALT 250 FOR IP): Performed by: UROLOGY

## 2019-01-13 PROCEDURE — 70547 MR ANGIOGRAPHY NECK W/O DYE: CPT

## 2019-01-13 PROCEDURE — 93880 EXTRACRANIAL BILAT STUDY: CPT

## 2019-01-13 PROCEDURE — 2580000003 HC RX 258: Performed by: INTERNAL MEDICINE

## 2019-01-13 PROCEDURE — 99254 IP/OBS CNSLTJ NEW/EST MOD 60: CPT | Performed by: UROLOGY

## 2019-01-13 PROCEDURE — 1210000000 HC MED SURG R&B

## 2019-01-13 PROCEDURE — 80069 RENAL FUNCTION PANEL: CPT

## 2019-01-13 PROCEDURE — 94150 VITAL CAPACITY TEST: CPT

## 2019-01-13 PROCEDURE — 6370000000 HC RX 637 (ALT 250 FOR IP): Performed by: INTERNAL MEDICINE

## 2019-01-13 PROCEDURE — 36415 COLL VENOUS BLD VENIPUNCTURE: CPT

## 2019-01-13 PROCEDURE — 70544 MR ANGIOGRAPHY HEAD W/O DYE: CPT

## 2019-01-13 PROCEDURE — 70551 MRI BRAIN STEM W/O DYE: CPT

## 2019-01-13 PROCEDURE — 85027 COMPLETE CBC AUTOMATED: CPT

## 2019-01-13 PROCEDURE — 97163 PT EVAL HIGH COMPLEX 45 MIN: CPT

## 2019-01-13 PROCEDURE — G8982 BODY POS GOAL STATUS: HCPCS

## 2019-01-13 PROCEDURE — 2700000000 HC OXYGEN THERAPY PER DAY

## 2019-01-13 PROCEDURE — 99232 SBSQ HOSP IP/OBS MODERATE 35: CPT | Performed by: INTERNAL MEDICINE

## 2019-01-13 PROCEDURE — 83735 ASSAY OF MAGNESIUM: CPT

## 2019-01-13 PROCEDURE — 51702 INSERT TEMP BLADDER CATH: CPT

## 2019-01-13 PROCEDURE — G8981 BODY POS CURRENT STATUS: HCPCS

## 2019-01-13 PROCEDURE — 6360000002 HC RX W HCPCS: Performed by: INTERNAL MEDICINE

## 2019-01-13 RX ORDER — TAMSULOSIN HYDROCHLORIDE 0.4 MG/1
0.4 CAPSULE ORAL DAILY
Status: DISCONTINUED | OUTPATIENT
Start: 2019-01-13 | End: 2019-01-14 | Stop reason: HOSPADM

## 2019-01-13 RX ORDER — FINASTERIDE 5 MG/1
5 TABLET, FILM COATED ORAL DAILY
Status: DISCONTINUED | OUTPATIENT
Start: 2019-01-13 | End: 2019-01-14 | Stop reason: HOSPADM

## 2019-01-13 RX ADMIN — MIRTAZAPINE 30 MG: 30 TABLET, FILM COATED ORAL at 21:13

## 2019-01-13 RX ADMIN — CARBIDOPA AND LEVODOPA 1 TABLET: 25; 100 TABLET ORAL at 15:08

## 2019-01-13 RX ADMIN — ASPIRIN AND EXTENDED-RELEASE DIPYRIDAMOLE 1 CAPSULE: 25; 200 CAPSULE ORAL at 09:24

## 2019-01-13 RX ADMIN — SODIUM CHLORIDE: 9 INJECTION, SOLUTION INTRAVENOUS at 21:13

## 2019-01-13 RX ADMIN — MEROPENEM 1 G: 1 INJECTION, POWDER, FOR SOLUTION INTRAVENOUS at 12:49

## 2019-01-13 RX ADMIN — ASPIRIN AND EXTENDED-RELEASE DIPYRIDAMOLE 1 CAPSULE: 25; 200 CAPSULE ORAL at 21:13

## 2019-01-13 RX ADMIN — CARBIDOPA AND LEVODOPA 1 TABLET: 50; 200 TABLET, EXTENDED RELEASE ORAL at 12:49

## 2019-01-13 RX ADMIN — VITAMIN D, TAB 1000IU (100/BT) 2000 UNITS: 25 TAB at 09:24

## 2019-01-13 RX ADMIN — TAMSULOSIN HYDROCHLORIDE 0.4 MG: 0.4 CAPSULE ORAL at 09:23

## 2019-01-13 RX ADMIN — ACETAMINOPHEN 650 MG: 325 TABLET ORAL at 12:49

## 2019-01-13 RX ADMIN — Medication 100 MG: at 09:24

## 2019-01-13 RX ADMIN — PREGABALIN 100 MG: 100 CAPSULE ORAL at 21:13

## 2019-01-13 RX ADMIN — CARBIDOPA AND LEVODOPA 1 TABLET: 50; 200 TABLET, EXTENDED RELEASE ORAL at 09:24

## 2019-01-13 RX ADMIN — CARBIDOPA AND LEVODOPA 1 TABLET: 50; 200 TABLET, EXTENDED RELEASE ORAL at 17:59

## 2019-01-13 RX ADMIN — SODIUM CHLORIDE: 9 INJECTION, SOLUTION INTRAVENOUS at 03:50

## 2019-01-13 RX ADMIN — PREGABALIN 100 MG: 100 CAPSULE ORAL at 09:24

## 2019-01-13 RX ADMIN — CARBIDOPA AND LEVODOPA 1 TABLET: 25; 100 TABLET ORAL at 21:13

## 2019-01-13 RX ADMIN — ACETAMINOPHEN 650 MG: 325 TABLET ORAL at 09:23

## 2019-01-13 RX ADMIN — MEROPENEM 1 G: 1 INJECTION, POWDER, FOR SOLUTION INTRAVENOUS at 03:50

## 2019-01-13 RX ADMIN — CYANOCOBALAMIN TAB 500 MCG 1000 MCG: 500 TAB at 09:24

## 2019-01-13 RX ADMIN — ROTIGOTINE 1 PATCH: 2 PATCH, EXTENDED RELEASE TRANSDERMAL at 09:25

## 2019-01-13 RX ADMIN — CARBIDOPA AND LEVODOPA 1 TABLET: 25; 100 TABLET ORAL at 05:56

## 2019-01-13 RX ADMIN — ACETAMINOPHEN 650 MG: 325 TABLET ORAL at 21:13

## 2019-01-13 RX ADMIN — FINASTERIDE 5 MG: 5 TABLET, FILM COATED ORAL at 15:08

## 2019-01-13 RX ADMIN — ACETAMINOPHEN 650 MG: 325 TABLET ORAL at 17:59

## 2019-01-13 ASSESSMENT — PAIN SCALES - GENERAL
PAINLEVEL_OUTOF10: 3
PAINLEVEL_OUTOF10: 2
PAINLEVEL_OUTOF10: 6
PAINLEVEL_OUTOF10: 0
PAINLEVEL_OUTOF10: 0
PAINLEVEL_OUTOF10: 5

## 2019-01-14 VITALS
DIASTOLIC BLOOD PRESSURE: 61 MMHG | SYSTOLIC BLOOD PRESSURE: 129 MMHG | HEART RATE: 87 BPM | TEMPERATURE: 98.2 F | OXYGEN SATURATION: 95 % | WEIGHT: 200 LBS | RESPIRATION RATE: 17 BRPM | BODY MASS INDEX: 31.39 KG/M2 | HEIGHT: 67 IN

## 2019-01-14 LAB
ALBUMIN SERPL-MCNC: 4.3 G/DL (ref 3.9–4.9)
ANION GAP SERPL CALCULATED.3IONS-SCNC: 14 MEQ/L (ref 7–13)
BUN BLDV-MCNC: 15 MG/DL (ref 8–23)
CALCIUM SERPL-MCNC: 9.9 MG/DL (ref 8.6–10.2)
CHLORIDE BLD-SCNC: 105 MEQ/L (ref 98–107)
CO2: 23 MEQ/L (ref 22–29)
CREAT SERPL-MCNC: 0.69 MG/DL (ref 0.7–1.2)
GFR AFRICAN AMERICAN: >60
GFR NON-AFRICAN AMERICAN: >60
GLUCOSE BLD-MCNC: 119 MG/DL (ref 74–109)
HCT VFR BLD CALC: 40 % (ref 42–52)
HEMOGLOBIN: 13.5 G/DL (ref 14–18)
MAGNESIUM: 1.9 MG/DL (ref 1.7–2.3)
MCH RBC QN AUTO: 29.7 PG (ref 27–31.3)
MCHC RBC AUTO-ENTMCNC: 33.7 % (ref 33–37)
MCV RBC AUTO: 88.1 FL (ref 80–100)
PDW BLD-RTO: 13.2 % (ref 11.5–14.5)
PHOSPHORUS: 2.5 MG/DL (ref 2.5–4.5)
PLATELET # BLD: 172 K/UL (ref 130–400)
POTASSIUM SERPL-SCNC: 4.1 MEQ/L (ref 3.5–5.1)
RBC # BLD: 4.54 M/UL (ref 4.7–6.1)
SODIUM BLD-SCNC: 142 MEQ/L (ref 132–144)
WBC # BLD: 12.1 K/UL (ref 4.8–10.8)

## 2019-01-14 PROCEDURE — 80069 RENAL FUNCTION PANEL: CPT

## 2019-01-14 PROCEDURE — 6360000002 HC RX W HCPCS: Performed by: INTERNAL MEDICINE

## 2019-01-14 PROCEDURE — 6370000000 HC RX 637 (ALT 250 FOR IP): Performed by: SPECIALIST

## 2019-01-14 PROCEDURE — 2700000000 HC OXYGEN THERAPY PER DAY

## 2019-01-14 PROCEDURE — G8996 SWALLOW CURRENT STATUS: HCPCS

## 2019-01-14 PROCEDURE — 97116 GAIT TRAINING THERAPY: CPT

## 2019-01-14 PROCEDURE — 6370000000 HC RX 637 (ALT 250 FOR IP): Performed by: INTERNAL MEDICINE

## 2019-01-14 PROCEDURE — 2580000003 HC RX 258: Performed by: INTERNAL MEDICINE

## 2019-01-14 PROCEDURE — 83735 ASSAY OF MAGNESIUM: CPT

## 2019-01-14 PROCEDURE — 92610 EVALUATE SWALLOWING FUNCTION: CPT

## 2019-01-14 PROCEDURE — 93010 ELECTROCARDIOGRAM REPORT: CPT | Performed by: INTERNAL MEDICINE

## 2019-01-14 PROCEDURE — 6370000000 HC RX 637 (ALT 250 FOR IP): Performed by: UROLOGY

## 2019-01-14 PROCEDURE — 99232 SBSQ HOSP IP/OBS MODERATE 35: CPT | Performed by: INTERNAL MEDICINE

## 2019-01-14 PROCEDURE — G8997 SWALLOW GOAL STATUS: HCPCS

## 2019-01-14 PROCEDURE — 85027 COMPLETE CBC AUTOMATED: CPT

## 2019-01-14 RX ORDER — FINASTERIDE 5 MG/1
5 TABLET, FILM COATED ORAL DAILY
Qty: 30 TABLET | Refills: 3 | Status: SHIPPED | OUTPATIENT
Start: 2019-01-15 | End: 2019-08-16 | Stop reason: SDUPTHER

## 2019-01-14 RX ORDER — ASPIRIN AND DIPYRIDAMOLE 25; 200 MG/1; MG/1
1 CAPSULE, EXTENDED RELEASE ORAL 2 TIMES DAILY
Qty: 60 CAPSULE | Refills: 3 | Status: SHIPPED | OUTPATIENT
Start: 2019-01-14 | End: 2019-02-13 | Stop reason: ALTCHOICE

## 2019-01-14 RX ORDER — TAMSULOSIN HYDROCHLORIDE 0.4 MG/1
0.4 CAPSULE ORAL DAILY
Qty: 30 CAPSULE | Refills: 3 | Status: SHIPPED | OUTPATIENT
Start: 2019-01-15 | End: 2019-08-16 | Stop reason: SDUPTHER

## 2019-01-14 RX ORDER — LEVOFLOXACIN 500 MG/1
500 TABLET, FILM COATED ORAL DAILY
Qty: 7 TABLET | Refills: 0 | Status: SHIPPED | OUTPATIENT
Start: 2019-01-14 | End: 2019-01-21

## 2019-01-14 RX ADMIN — CARBIDOPA AND LEVODOPA 1 TABLET: 25; 100 TABLET ORAL at 12:01

## 2019-01-14 RX ADMIN — ASPIRIN AND EXTENDED-RELEASE DIPYRIDAMOLE 1 CAPSULE: 25; 200 CAPSULE ORAL at 09:15

## 2019-01-14 RX ADMIN — CARBIDOPA AND LEVODOPA 1 TABLET: 50; 200 TABLET, EXTENDED RELEASE ORAL at 12:01

## 2019-01-14 RX ADMIN — TAMSULOSIN HYDROCHLORIDE 0.4 MG: 0.4 CAPSULE ORAL at 09:15

## 2019-01-14 RX ADMIN — ROTIGOTINE 1 PATCH: 2 PATCH, EXTENDED RELEASE TRANSDERMAL at 09:14

## 2019-01-14 RX ADMIN — FINASTERIDE 5 MG: 5 TABLET, FILM COATED ORAL at 09:25

## 2019-01-14 RX ADMIN — CARBIDOPA AND LEVODOPA 1 TABLET: 50; 200 TABLET, EXTENDED RELEASE ORAL at 09:14

## 2019-01-14 RX ADMIN — MEROPENEM 1 G: 1 INJECTION, POWDER, FOR SOLUTION INTRAVENOUS at 00:13

## 2019-01-14 RX ADMIN — ACETAMINOPHEN 650 MG: 325 TABLET ORAL at 09:15

## 2019-01-14 RX ADMIN — MEROPENEM 1 G: 1 INJECTION, POWDER, FOR SOLUTION INTRAVENOUS at 09:15

## 2019-01-14 RX ADMIN — Medication 100 MG: at 09:15

## 2019-01-14 RX ADMIN — CARBIDOPA AND LEVODOPA 1 TABLET: 25; 100 TABLET ORAL at 09:14

## 2019-01-14 RX ADMIN — VITAMIN D, TAB 1000IU (100/BT) 2000 UNITS: 25 TAB at 09:15

## 2019-01-14 RX ADMIN — CARBIDOPA AND LEVODOPA 1 TABLET: 25; 100 TABLET ORAL at 16:01

## 2019-01-14 RX ADMIN — ACETAMINOPHEN 650 MG: 325 TABLET ORAL at 16:01

## 2019-01-14 RX ADMIN — CARBIDOPA AND LEVODOPA 1 TABLET: 50; 200 TABLET, EXTENDED RELEASE ORAL at 16:02

## 2019-01-14 RX ADMIN — CYANOCOBALAMIN TAB 500 MCG 1000 MCG: 500 TAB at 09:15

## 2019-01-14 RX ADMIN — PREGABALIN 100 MG: 100 CAPSULE ORAL at 09:15

## 2019-01-14 RX ADMIN — CYCLOBENZAPRINE HYDROCHLORIDE 10 MG: 10 TABLET, FILM COATED ORAL at 03:28

## 2019-01-14 RX ADMIN — ACETAMINOPHEN 650 MG: 325 TABLET ORAL at 12:01

## 2019-01-14 ASSESSMENT — PAIN SCALES - GENERAL
PAINLEVEL_OUTOF10: 2
PAINLEVEL_OUTOF10: 2
PAINLEVEL_OUTOF10: 4
PAINLEVEL_OUTOF10: 5
PAINLEVEL_OUTOF10: 0
PAINLEVEL_OUTOF10: 0

## 2019-01-17 LAB
BLOOD CULTURE, ROUTINE: NORMAL
CULTURE, BLOOD 2: NORMAL

## 2019-01-18 PROCEDURE — 93010 ELECTROCARDIOGRAM REPORT: CPT | Performed by: INTERNAL MEDICINE

## 2019-02-06 ENCOUNTER — TELEPHONE (OUTPATIENT)
Dept: UROLOGY | Age: 64
End: 2019-02-06

## 2019-02-06 LAB — LITHIUM LEVEL: 0.4 MEQ/L (ref 0.6–1.2)

## 2019-02-13 ENCOUNTER — OFFICE VISIT (OUTPATIENT)
Dept: UROLOGY | Age: 64
End: 2019-02-13
Payer: COMMERCIAL

## 2019-02-13 VITALS
DIASTOLIC BLOOD PRESSURE: 80 MMHG | HEART RATE: 91 BPM | WEIGHT: 220 LBS | HEIGHT: 73 IN | BODY MASS INDEX: 29.16 KG/M2 | SYSTOLIC BLOOD PRESSURE: 132 MMHG

## 2019-02-13 DIAGNOSIS — Z87.898 HISTORY OF URINARY RETENTION: Primary | ICD-10-CM

## 2019-02-13 PROCEDURE — G8484 FLU IMMUNIZE NO ADMIN: HCPCS | Performed by: UROLOGY

## 2019-02-13 PROCEDURE — 99999 PR OFFICE/OUTPT VISIT,PROCEDURE ONLY: CPT | Performed by: UROLOGY

## 2019-02-13 PROCEDURE — G8419 CALC BMI OUT NRM PARAM NOF/U: HCPCS | Performed by: UROLOGY

## 2019-02-13 PROCEDURE — 1036F TOBACCO NON-USER: CPT | Performed by: UROLOGY

## 2019-02-13 PROCEDURE — 99213 OFFICE O/P EST LOW 20 MIN: CPT | Performed by: UROLOGY

## 2019-02-13 PROCEDURE — G8598 ASA/ANTIPLAT THER USED: HCPCS | Performed by: UROLOGY

## 2019-02-13 PROCEDURE — G8427 DOCREV CUR MEDS BY ELIG CLIN: HCPCS | Performed by: UROLOGY

## 2019-02-13 PROCEDURE — 3017F COLORECTAL CA SCREEN DOC REV: CPT | Performed by: UROLOGY

## 2019-02-13 RX ORDER — ZOLPIDEM TARTRATE 5 MG/1
5 TABLET ORAL NIGHTLY PRN
COMMUNITY
End: 2019-08-16

## 2019-02-13 RX ORDER — DESVENLAFAXINE 25 MG/1
25 TABLET, EXTENDED RELEASE ORAL DAILY
Status: ON HOLD | COMMUNITY
End: 2019-06-06 | Stop reason: HOSPADM

## 2019-02-13 RX ORDER — DIAZEPAM 5 MG/1
5 TABLET ORAL 2 TIMES DAILY PRN
Status: ON HOLD | COMMUNITY
End: 2019-06-06 | Stop reason: HOSPADM

## 2019-02-13 ASSESSMENT — ENCOUNTER SYMPTOMS
ABDOMINAL PAIN: 0
ABDOMINAL DISTENTION: 0

## 2019-04-11 LAB
HCT VFR BLD CALC: 39.9 % (ref 42–52)
HEMOGLOBIN: 13.2 G/DL (ref 14–18)
MCH RBC QN AUTO: 29.1 PG (ref 27–31.3)
MCHC RBC AUTO-ENTMCNC: 33.1 % (ref 33–37)
MCV RBC AUTO: 87.8 FL (ref 80–100)
PDW BLD-RTO: 14.4 % (ref 11.5–14.5)
PLATELET # BLD: 194 K/UL (ref 130–400)
RBC # BLD: 4.55 M/UL (ref 4.7–6.1)
WBC # BLD: 6.7 K/UL (ref 4.8–10.8)

## 2019-05-31 ENCOUNTER — HOSPITAL ENCOUNTER (INPATIENT)
Age: 64
LOS: 6 days | Discharge: NURSING, REHAB OR LONG-TERM CARE | DRG: 751 | End: 2019-06-06
Attending: PSYCHIATRY & NEUROLOGY | Admitting: PSYCHIATRY & NEUROLOGY
Payer: COMMERCIAL

## 2019-05-31 PROBLEM — F33.2 SEVERE EPISODE OF RECURRENT MAJOR DEPRESSIVE DISORDER, WITHOUT PSYCHOTIC FEATURES (HCC): Status: ACTIVE | Noted: 2019-05-31

## 2019-05-31 PROBLEM — F33.9 MDD (MAJOR DEPRESSIVE DISORDER), RECURRENT EPISODE (HCC): Status: ACTIVE | Noted: 2019-05-31

## 2019-05-31 LAB
ALBUMIN SERPL-MCNC: 4.4 G/DL (ref 3.5–4.6)
ALP BLD-CCNC: 95 U/L (ref 35–104)
ALT SERPL-CCNC: <5 U/L (ref 0–41)
ANION GAP SERPL CALCULATED.3IONS-SCNC: 12 MEQ/L (ref 9–15)
AST SERPL-CCNC: 18 U/L (ref 0–40)
BASOPHILS ABSOLUTE: 0 K/UL (ref 0–0.2)
BASOPHILS RELATIVE PERCENT: 0.5 %
BILIRUB SERPL-MCNC: <0.2 MG/DL (ref 0.2–0.7)
BUN BLDV-MCNC: 11 MG/DL (ref 8–23)
CALCIUM SERPL-MCNC: 9.5 MG/DL (ref 8.5–9.9)
CHLORIDE BLD-SCNC: 101 MEQ/L (ref 95–107)
CO2: 27 MEQ/L (ref 20–31)
CREAT SERPL-MCNC: 0.57 MG/DL (ref 0.7–1.2)
EOSINOPHILS ABSOLUTE: 0.6 K/UL (ref 0–0.7)
EOSINOPHILS RELATIVE PERCENT: 8.4 %
GFR AFRICAN AMERICAN: >60
GFR NON-AFRICAN AMERICAN: >60
GLOBULIN: 3.7 G/DL (ref 2.3–3.5)
GLUCOSE BLD-MCNC: 112 MG/DL (ref 70–99)
HCT VFR BLD CALC: 40.7 % (ref 42–52)
HEMOGLOBIN: 13.5 G/DL (ref 14–18)
LYMPHOCYTES ABSOLUTE: 1.2 K/UL (ref 1–4.8)
LYMPHOCYTES RELATIVE PERCENT: 16.8 %
MCH RBC QN AUTO: 28.6 PG (ref 27–31.3)
MCHC RBC AUTO-ENTMCNC: 33.1 % (ref 33–37)
MCV RBC AUTO: 86.4 FL (ref 80–100)
MONOCYTES ABSOLUTE: 0.6 K/UL (ref 0.2–0.8)
MONOCYTES RELATIVE PERCENT: 8.3 %
NEUTROPHILS ABSOLUTE: 4.6 K/UL (ref 1.4–6.5)
NEUTROPHILS RELATIVE PERCENT: 66 %
PDW BLD-RTO: 14.7 % (ref 11.5–14.5)
PLATELET # BLD: 203 K/UL (ref 130–400)
POTASSIUM SERPL-SCNC: 5 MEQ/L (ref 3.4–4.9)
RBC # BLD: 4.71 M/UL (ref 4.7–6.1)
SODIUM BLD-SCNC: 140 MEQ/L (ref 135–144)
TOTAL PROTEIN: 8.1 G/DL (ref 6.3–8)
TSH SERPL DL<=0.05 MIU/L-ACNC: 2.68 UIU/ML (ref 0.44–3.86)
WBC # BLD: 6.9 K/UL (ref 4.8–10.8)

## 2019-05-31 PROCEDURE — 6360000002 HC RX W HCPCS: Performed by: INTERNAL MEDICINE

## 2019-05-31 PROCEDURE — 1240000000 HC EMOTIONAL WELLNESS R&B

## 2019-05-31 PROCEDURE — 6370000000 HC RX 637 (ALT 250 FOR IP): Performed by: PSYCHIATRY & NEUROLOGY

## 2019-05-31 PROCEDURE — 85025 COMPLETE CBC W/AUTO DIFF WBC: CPT

## 2019-05-31 PROCEDURE — 84443 ASSAY THYROID STIM HORMONE: CPT

## 2019-05-31 PROCEDURE — 80053 COMPREHEN METABOLIC PANEL: CPT

## 2019-05-31 PROCEDURE — 36415 COLL VENOUS BLD VENIPUNCTURE: CPT

## 2019-05-31 RX ORDER — SENNA AND DOCUSATE SODIUM 50; 8.6 MG/1; MG/1
2 TABLET, FILM COATED ORAL NIGHTLY
Status: DISCONTINUED | OUTPATIENT
Start: 2019-05-31 | End: 2019-06-06 | Stop reason: HOSPADM

## 2019-05-31 RX ORDER — TRAZODONE HYDROCHLORIDE 50 MG/1
50 TABLET ORAL NIGHTLY PRN
Status: CANCELLED | OUTPATIENT
Start: 2019-05-31

## 2019-05-31 RX ORDER — CYCLOBENZAPRINE HCL 10 MG
10 TABLET ORAL 3 TIMES DAILY PRN
Status: DISCONTINUED | OUTPATIENT
Start: 2019-05-31 | End: 2019-06-03

## 2019-05-31 RX ORDER — ASPIRIN AND DIPYRIDAMOLE 25; 200 MG/1; MG/1
1 CAPSULE, EXTENDED RELEASE ORAL 2 TIMES DAILY
COMMUNITY
End: 2019-08-16

## 2019-05-31 RX ORDER — MIRTAZAPINE 30 MG/1
30 TABLET, FILM COATED ORAL NIGHTLY
Status: DISCONTINUED | OUTPATIENT
Start: 2019-05-31 | End: 2019-06-06 | Stop reason: HOSPADM

## 2019-05-31 RX ORDER — LITHIUM CARBONATE 150 MG/1
150 CAPSULE ORAL 2 TIMES DAILY
Status: DISCONTINUED | OUTPATIENT
Start: 2019-05-31 | End: 2019-06-06 | Stop reason: HOSPADM

## 2019-05-31 RX ORDER — SENNA AND DOCUSATE SODIUM 50; 8.6 MG/1; MG/1
2 TABLET, FILM COATED ORAL NIGHTLY
COMMUNITY
End: 2019-08-16

## 2019-05-31 RX ORDER — ACETAMINOPHEN 325 MG/1
650 TABLET ORAL 4 TIMES DAILY
Status: DISCONTINUED | OUTPATIENT
Start: 2019-05-31 | End: 2019-06-03

## 2019-05-31 RX ORDER — HYDROXYZINE PAMOATE 25 MG/1
50 CAPSULE ORAL EVERY 6 HOURS PRN
Status: CANCELLED | OUTPATIENT
Start: 2019-05-31

## 2019-05-31 RX ORDER — BENZTROPINE MESYLATE 1 MG/ML
2 INJECTION INTRAMUSCULAR; INTRAVENOUS 2 TIMES DAILY PRN
Status: CANCELLED | OUTPATIENT
Start: 2019-05-31

## 2019-05-31 RX ORDER — BUPRENORPHINE HYDROCHLORIDE AND NALOXONE HYDROCHLORIDE DIHYDRATE 2; .5 MG/1; MG/1
1 TABLET SUBLINGUAL DAILY
Status: DISCONTINUED | OUTPATIENT
Start: 2019-05-31 | End: 2019-06-03

## 2019-05-31 RX ORDER — PREGABALIN 100 MG/1
100 CAPSULE ORAL 2 TIMES DAILY
Status: DISCONTINUED | OUTPATIENT
Start: 2019-05-31 | End: 2019-06-06 | Stop reason: HOSPADM

## 2019-05-31 RX ORDER — ACETAMINOPHEN 325 MG/1
650 TABLET ORAL EVERY 4 HOURS PRN
Status: CANCELLED | OUTPATIENT
Start: 2019-05-31

## 2019-05-31 RX ORDER — FINASTERIDE 5 MG/1
5 TABLET, FILM COATED ORAL DAILY
Status: DISCONTINUED | OUTPATIENT
Start: 2019-05-31 | End: 2019-06-06 | Stop reason: HOSPADM

## 2019-05-31 RX ORDER — APOMORPHINE HYDROCHLORIDE 30 MG/3ML
0.4 INJECTION SUBCUTANEOUS
COMMUNITY

## 2019-05-31 RX ORDER — PROCHLORPERAZINE MALEATE 5 MG/1
10 TABLET ORAL EVERY 6 HOURS PRN
Status: DISCONTINUED | OUTPATIENT
Start: 2019-05-31 | End: 2019-06-06 | Stop reason: HOSPADM

## 2019-05-31 RX ORDER — APOMORPHINE HYDROCHLORIDE 30 MG/3ML
0.4 INJECTION SUBCUTANEOUS
Status: DISCONTINUED | OUTPATIENT
Start: 2019-05-31 | End: 2019-06-06 | Stop reason: HOSPADM

## 2019-05-31 RX ORDER — MAGNESIUM HYDROXIDE/ALUMINUM HYDROXICE/SIMETHICONE 120; 1200; 1200 MG/30ML; MG/30ML; MG/30ML
30 SUSPENSION ORAL PRN
Status: CANCELLED | OUTPATIENT
Start: 2019-05-31

## 2019-05-31 RX ORDER — ASPIRIN AND DIPYRIDAMOLE 25; 200 MG/1; MG/1
1 CAPSULE, EXTENDED RELEASE ORAL 2 TIMES DAILY
Status: DISCONTINUED | OUTPATIENT
Start: 2019-05-31 | End: 2019-06-06 | Stop reason: HOSPADM

## 2019-05-31 RX ORDER — HALOPERIDOL 5 MG/ML
5 INJECTION INTRAMUSCULAR EVERY 4 HOURS PRN
Status: CANCELLED | OUTPATIENT
Start: 2019-05-31

## 2019-05-31 RX ORDER — TAMSULOSIN HYDROCHLORIDE 0.4 MG/1
0.4 CAPSULE ORAL DAILY
Status: DISCONTINUED | OUTPATIENT
Start: 2019-05-31 | End: 2019-06-06 | Stop reason: HOSPADM

## 2019-05-31 RX ADMIN — MIRTAZAPINE 30 MG: 30 TABLET, FILM COATED ORAL at 20:46

## 2019-05-31 RX ADMIN — LITHIUM CARBONATE 150 MG: 150 CAPSULE, GELATIN COATED ORAL at 20:46

## 2019-05-31 RX ADMIN — ROTIGOTINE 1 PATCH: 2 PATCH, EXTENDED RELEASE TRANSDERMAL at 21:54

## 2019-05-31 RX ADMIN — CYCLOBENZAPRINE HYDROCHLORIDE 10 MG: 10 TABLET, FILM COATED ORAL at 20:46

## 2019-05-31 RX ADMIN — PREGABALIN 100 MG: 100 CAPSULE ORAL at 20:46

## 2019-05-31 RX ADMIN — FINASTERIDE 5 MG: 5 TABLET, FILM COATED ORAL at 22:56

## 2019-05-31 RX ADMIN — SENNOSIDES AND DOCUSATE SODIUM 2 TABLET: 8.6; 5 TABLET ORAL at 20:46

## 2019-05-31 RX ADMIN — TAMSULOSIN HYDROCHLORIDE 0.4 MG: 0.4 CAPSULE ORAL at 20:46

## 2019-05-31 RX ADMIN — ASPIRIN AND DIPYRIDAMOLE 1 CAPSULE: 25; 200 CAPSULE, EXTENDED RELEASE ORAL at 21:53

## 2019-05-31 RX ADMIN — CARBIDOPA AND LEVODOPA 1 TABLET: 25; 100 TABLET ORAL at 20:47

## 2019-05-31 RX ADMIN — ACETAMINOPHEN 650 MG: 325 TABLET ORAL at 20:46

## 2019-05-31 RX ADMIN — BUPRENORPHINE AND NALOXONE 1 TABLET: 2; .5 TABLET SUBLINGUAL at 22:56

## 2019-05-31 RX ADMIN — APOMORPHINE HYDROCHLORIDE 0.4 ML: 30 INJECTION SUBCUTANEOUS at 20:43

## 2019-05-31 RX ADMIN — NICOTINE POLACRILEX 4 MG: 4 GUM, CHEWING BUCCAL at 20:47

## 2019-05-31 ASSESSMENT — PAIN SCALES - GENERAL
PAINLEVEL_OUTOF10: 5
PAINLEVEL_OUTOF10: 8

## 2019-05-31 ASSESSMENT — PATIENT HEALTH QUESTIONNAIRE - PHQ9: SUM OF ALL RESPONSES TO PHQ QUESTIONS 1-9: 15

## 2019-05-31 ASSESSMENT — SLEEP AND FATIGUE QUESTIONNAIRES
SLEEP PATTERN: DISTURBED/INTERRUPTED SLEEP
DO YOU HAVE DIFFICULTY SLEEPING: YES

## 2019-06-01 ENCOUNTER — APPOINTMENT (OUTPATIENT)
Dept: GENERAL RADIOLOGY | Age: 64
DRG: 751 | End: 2019-06-01
Attending: PSYCHIATRY & NEUROLOGY
Payer: COMMERCIAL

## 2019-06-01 LAB
AMPHETAMINE SCREEN, URINE: ABNORMAL
BARBITURATE SCREEN URINE: ABNORMAL
BENZODIAZEPINE SCREEN, URINE: POSITIVE
BILIRUBIN URINE: NEGATIVE
BLOOD, URINE: NEGATIVE
CANNABINOID SCREEN URINE: ABNORMAL
CLARITY: CLEAR
COCAINE METABOLITE SCREEN URINE: ABNORMAL
COLOR: ABNORMAL
EKG ATRIAL RATE: 74 BPM
EKG P AXIS: 58 DEGREES
EKG P-R INTERVAL: 152 MS
EKG Q-T INTERVAL: 414 MS
EKG QRS DURATION: 76 MS
EKG QTC CALCULATION (BAZETT): 459 MS
EKG R AXIS: 10 DEGREES
EKG T AXIS: 32 DEGREES
EKG VENTRICULAR RATE: 74 BPM
GLUCOSE URINE: NEGATIVE MG/DL
KETONES, URINE: ABNORMAL MG/DL
LEUKOCYTE ESTERASE, URINE: NEGATIVE
Lab: ABNORMAL
NITRITE, URINE: NEGATIVE
OPIATE SCREEN URINE: POSITIVE
PH UA: 5 (ref 5–9)
PHENCYCLIDINE SCREEN URINE: ABNORMAL
PROTEIN UA: NEGATIVE MG/DL
SPECIFIC GRAVITY UA: 1.03 (ref 1–1.03)
UROBILINOGEN, URINE: 0.2 E.U./DL

## 2019-06-01 PROCEDURE — 80307 DRUG TEST PRSMV CHEM ANLYZR: CPT

## 2019-06-01 PROCEDURE — 71046 X-RAY EXAM CHEST 2 VIEWS: CPT

## 2019-06-01 PROCEDURE — 1240000000 HC EMOTIONAL WELLNESS R&B

## 2019-06-01 PROCEDURE — 81003 URINALYSIS AUTO W/O SCOPE: CPT

## 2019-06-01 PROCEDURE — 6370000000 HC RX 637 (ALT 250 FOR IP): Performed by: NURSE PRACTITIONER

## 2019-06-01 PROCEDURE — 93005 ELECTROCARDIOGRAM TRACING: CPT

## 2019-06-01 PROCEDURE — 6360000002 HC RX W HCPCS: Performed by: INTERNAL MEDICINE

## 2019-06-01 PROCEDURE — 6370000000 HC RX 637 (ALT 250 FOR IP): Performed by: PSYCHIATRY & NEUROLOGY

## 2019-06-01 PROCEDURE — 97163 PT EVAL HIGH COMPLEX 45 MIN: CPT

## 2019-06-01 RX ORDER — PRAVASTATIN SODIUM 10 MG
20 TABLET ORAL NIGHTLY
Status: DISCONTINUED | OUTPATIENT
Start: 2019-06-01 | End: 2019-06-06 | Stop reason: HOSPADM

## 2019-06-01 RX ORDER — LISINOPRIL 5 MG/1
2.5 TABLET ORAL DAILY
Status: DISCONTINUED | OUTPATIENT
Start: 2019-06-01 | End: 2019-06-01

## 2019-06-01 RX ADMIN — APOMORPHINE HYDROCHLORIDE 0.4 ML: 30 INJECTION SUBCUTANEOUS at 11:16

## 2019-06-01 RX ADMIN — SENNOSIDES AND DOCUSATE SODIUM 2 TABLET: 8.6; 5 TABLET ORAL at 20:48

## 2019-06-01 RX ADMIN — LISINOPRIL 2.5 MG: 5 TABLET ORAL at 13:12

## 2019-06-01 RX ADMIN — APOMORPHINE HYDROCHLORIDE 0.4 ML: 30 INJECTION SUBCUTANEOUS at 22:46

## 2019-06-01 RX ADMIN — CARBIDOPA AND LEVODOPA 1 TABLET: 25; 100 TABLET ORAL at 20:49

## 2019-06-01 RX ADMIN — CARBIDOPA AND LEVODOPA 1 TABLET: 25; 100 TABLET ORAL at 11:18

## 2019-06-01 RX ADMIN — LITHIUM CARBONATE 150 MG: 150 CAPSULE, GELATIN COATED ORAL at 20:49

## 2019-06-01 RX ADMIN — BUPRENORPHINE AND NALOXONE 1 TABLET: 2; .5 TABLET SUBLINGUAL at 09:27

## 2019-06-01 RX ADMIN — CARBIDOPA AND LEVODOPA 1 TABLET: 25; 100 TABLET ORAL at 06:21

## 2019-06-01 RX ADMIN — TAMSULOSIN HYDROCHLORIDE 0.4 MG: 0.4 CAPSULE ORAL at 09:28

## 2019-06-01 RX ADMIN — PREGABALIN 100 MG: 100 CAPSULE ORAL at 09:28

## 2019-06-01 RX ADMIN — ACETAMINOPHEN 650 MG: 325 TABLET ORAL at 09:29

## 2019-06-01 RX ADMIN — PRAVASTATIN SODIUM 20 MG: 10 TABLET ORAL at 20:49

## 2019-06-01 RX ADMIN — ACETAMINOPHEN 650 MG: 325 TABLET ORAL at 20:49

## 2019-06-01 RX ADMIN — CARBIDOPA AND LEVODOPA 1 TABLET: 25; 100 TABLET ORAL at 00:24

## 2019-06-01 RX ADMIN — LITHIUM CARBONATE 150 MG: 150 CAPSULE, GELATIN COATED ORAL at 09:28

## 2019-06-01 RX ADMIN — ACETAMINOPHEN 650 MG: 325 TABLET ORAL at 13:12

## 2019-06-01 RX ADMIN — ASPIRIN AND DIPYRIDAMOLE 1 CAPSULE: 25; 200 CAPSULE, EXTENDED RELEASE ORAL at 20:48

## 2019-06-01 RX ADMIN — ROTIGOTINE 1 PATCH: 2 PATCH, EXTENDED RELEASE TRANSDERMAL at 09:29

## 2019-06-01 RX ADMIN — FINASTERIDE 5 MG: 5 TABLET, FILM COATED ORAL at 09:28

## 2019-06-01 RX ADMIN — APOMORPHINE HYDROCHLORIDE 0.4 ML: 30 INJECTION SUBCUTANEOUS at 16:53

## 2019-06-01 RX ADMIN — PREGABALIN 100 MG: 100 CAPSULE ORAL at 20:49

## 2019-06-01 RX ADMIN — ASPIRIN AND DIPYRIDAMOLE 1 CAPSULE: 25; 200 CAPSULE, EXTENDED RELEASE ORAL at 09:28

## 2019-06-01 RX ADMIN — CARBIDOPA AND LEVODOPA 1 TABLET: 25; 100 TABLET ORAL at 22:51

## 2019-06-01 RX ADMIN — ACETAMINOPHEN 650 MG: 325 TABLET ORAL at 16:39

## 2019-06-01 RX ADMIN — MIRTAZAPINE 30 MG: 30 TABLET, FILM COATED ORAL at 20:49

## 2019-06-01 RX ADMIN — CARBIDOPA AND LEVODOPA 1 TABLET: 25; 100 TABLET ORAL at 15:04

## 2019-06-01 RX ADMIN — VITAMIN D, TAB 1000IU (100/BT) 2000 UNITS: 25 TAB at 09:28

## 2019-06-01 RX ADMIN — APOMORPHINE HYDROCHLORIDE 0.4 ML: 30 INJECTION SUBCUTANEOUS at 03:34

## 2019-06-01 ASSESSMENT — PAIN SCALES - GENERAL
PAINLEVEL_OUTOF10: 8
PAINLEVEL_OUTOF10: 8
PAINLEVEL_OUTOF10: 7
PAINLEVEL_OUTOF10: 7
PAINLEVEL_OUTOF10: 8

## 2019-06-01 ASSESSMENT — LIFESTYLE VARIABLES: HISTORY_ALCOHOL_USE: NO

## 2019-06-01 NOTE — FLOWSHEET NOTE
Pt has been visible on the unit. Up in the wheelchair. Dressed and well kempt in appearance. Girlfriend on the unit visiting. Admits to ongoing depression 5/10. Denies SI/HI/AVH and anxiety. Reports disrupted sleep last night due to pain. Went for c-xray and reminded of need for urine sample. Pt verbalized understanding. Eating well.

## 2019-06-01 NOTE — PROGRESS NOTES
Cooperative with admission assessment/ consents. Pt reports coming in for ECT treatments per recommendation of Dr. Qamar Lyles. Pt reports hx of 3 strokes and parkinson's x 6 years which has advanced. Pt denies depression, anxiety, or disturbances with mood. Pt denies current or previous SI, HI, or AV hallucinations. Pt upset d/t community setting on [address] [facility] [address] stating he thought he was going to a regular hospital room. Pt has flat affect, reactive. Hopeful for ECT treatments.

## 2019-06-01 NOTE — PROGRESS NOTES
Ct understands reason for being here, increase in depression. Hoping ECT tx will help with encouragement of Psychiatrist. PRN medication given for Parkinsonian symptoms,1653 Apomorphine, pt's own.

## 2019-06-01 NOTE — FLOWSHEET NOTE
Pt to unit via [facility] from [facility]. Skin check/ contraband check completed. Skin intact. Pt oriented to room and unit. Admission interview completed. Pt has been feeling increasingly depressed primarily over last year. States he has been working with Dr. Glenn Murrieta at the nursing home. Wants ECT. Provided additional information on this. Denies current and past SI/HI. Says at times he can [facility] from his parkinson's medications. Pt has suffered 3 strokes ( [id number] and ). Wife  in  and was diagnosed with parkinson's same here. Says he is now in end stage parkinson's and has been working with Dr. Sammy Oates. Feels he is not a quitter and wants to Ama. \" Flat/sad in appearance. Denies anxiety. Reports difficulty sleeping due to pain. Fair appetite. Has good social support from girlfriend and niece.

## 2019-06-01 NOTE — H&P
Department of Psychiatry  History and Physical - Adult     CHIEF COMPLAINT:  Depression, SI    History obtained from:  patient    Patient was seen after discussing with the treatment team and reviewing the chart\      HISTORY OF PRESENT ILLNESS:      The patient is a 66 y.o. male with significant past history of MDD  Pt has been feeling depressed for many months and has not been getting better  Severity: Rating mood to be around 2/10 (10- good)  Quality:melancholic  Worse in the mornign  Content: Hopeless, worthless and helpless feeling  Suicidal thoughts - passive  Associated symptoms:  Poor concentration, anhedonia, decrease motivation  Sleep and appetite- poor    Stressors:PD disabled, in NH setting for man years now, wife supportive    The patient is currently receiving care for the above psychiatric illness. Medications Prior to Admission:   Medications Prior to Admission: Apomorphine HCl (APOKYN) 30 MG/3ML SOCT, Inject 0.4 mLs into the skin every 2 hours as needed (must be given two hours apart (not to exceed a total of 5 doses a day)) Must be given 2 hours apart. Not to exceed 5 doses per day. dipyridamole-aspirin (AGGRENOX)  MG per extended release capsule, Take 1 capsule by mouth 2 times daily  Menthol, Topical Analgesic, (BIOFREEZE) 4 % GEL, Apply topically 3 times daily  Carbidopa-Levodopa ER (RYTARY) 36. MG CPCR, Take 1 capsule by mouth 3 times daily  sennosides-docusate sodium (SENOKOT-S) 8.6-50 MG tablet, Take 2 tablets by mouth nightly  morphine-naltrexone (EMBEDA) 20-0.8 MG CPCR, Take 1 capsule by mouth 2 times daily. .  diazepam (VALIUM) 5 MG tablet, Take 5 mg by mouth 2 times daily as needed for Anxiety.    sertraline (ZOLOFT) 50 MG tablet, Take 50 mg by mouth daily  tamsulosin (FLOMAX) 0.4 MG capsule, Take 1 capsule by mouth daily  finasteride (PROSCAR) 5 MG tablet, Take 1 tablet by mouth daily  pregabalin (LYRICA) 100 MG capsule, Take 1 capsule by mouth 2 times daily for 30 days..  rotigotine (NEUPRO) 2 MG/24HR, Place 1 patch onto the skin daily  lithium 300 MG tablet, Take 150 mg by mouth 2 times daily   cyclobenzaprine (FLEXERIL) 10 MG tablet, Take 10 mg by mouth 3 times daily as needed for Muscle spasms  mirtazapine (REMERON) 15 MG tablet, Take 30 mg by mouth nightly   carbidopa-levodopa (SINEMET)  MG per tablet, Take 1 tablet by mouth 5 times daily   desvenlafaxine succinate (PRISTIQ) 25 MG TB24 extended release tablet, Take 25 mg by mouth daily  zolpidem (AMBIEN) 5 MG tablet, Take 5 mg by mouth nightly as needed for Sleep. .  vitamin B-12 (CYANOCOBALAMIN) 1000 MCG tablet, Take 1,000 mcg by mouth daily  acetaminophen (TYLENOL) 325 MG tablet, Take 650 mg by mouth 4 times daily   Thiamine HCl (VITAMIN B-1 PO), Take 100 mg by mouth every morning   Cholecalciferol (VITAMIN D3) 2000 units TABS, Take 2,000 Units by mouth every morning  prochlorperazine (COMPAZINE) 10 MG tablet, Take 1 tablet by mouth every 6 hours as needed for Nausea for 30 days. Compliance:yes    Psychiatric Review of Systems       Depression: yes     Devorah or Hypomania:  no     Panic Attacks:  no     Phobias:  no     Obsessions and Compulsions:  no     PTSD : no     Hallucinations:  no     Delusions:  no    Substance Abuse History:  ETOH: no   Marijuana: no  Opiates: no  Other Drugs: no      Past Psychiatric History:  Prior Diagnosis:  Major depressive disorder; recurrent  Psychiatrist: Dr Ace Favors  Therapist:no  Hospitalization: yes  Hx of Suicidal Attempts: no  Hx of violence:  no  ECT: no  Previous discontinued Psychiatric Med Trials: multiple medication trial failed    Past Medical History:        Diagnosis Date    CAD (coronary artery disease)     CVA (cerebral infarction) 2007, 2008    x2    Hemiplegia as late effect of cerebrovascular accident ([facility] [address] 2012    This is heavily debated. MRI shows small vessel CVD and nothing to suggest significant prior stroke to leave hemiplegia.   Neurology has suggested malingering.  Hepatitis C     Hepatitis C 2011    Kidney mass     Leukopenia     [facility]) [facility] [address]     MI (myocardial infarction) ([facility] [address] 2009    clean coronaries, no stenting - this is questionable    Osteosarcoma (HCC)     Pneumonia     Stroke ([facility] [address]     X2       Past Surgical History:        Procedure Laterality Date    BONE RESECTION, RIB      BONY  TUMOR    BONY PELVIS SURGERY  1982    CORONARY ANGIOPLASTY WITH STENT PLACEMENT      X2    CORONARY ANGIOPLASTY WITH STENT PLACEMENT      x2    KIDNEY CYST REMOVAL      benign    KIDNEY SURGERY      TUMOR AND STONE REMOVED       Allergies:   Pcn [penicillins]; Penicillins; Ppd [tuberculin purified protein derivative]; and Zofran [ondansetron hcl]    Family History  Family History   Problem Relation Age of Onset    Cancer Mother     High Blood Pressure Mother     Stroke Father     Heart Attack Father     Heart Disease Father     High Blood Pressure Father     Diabetes Other         GM         Social History:  Born [place] Raised: [place]  Describes Childhood:   supportive  Education: [employer]  Employment: Disabled  Relationships:   Children:   Current Support: romantic partner    Legal Hx: none  Access to weapons?:  No      EXAMINATION:    REVIEW OF SYSTEMS:    ROS:  [x] All negative/unchanged except if checked.  Explain positive(checked items) below:  [] Constitutional  [] Eyes  [] Ear/Nose/Mouth/Throat  [] Respiratory  [] CV  [] GI  []   [] Musculoskeletal  [] Skin/Breast  [] Neurological  [] Endocrine  [] Heme/Lymph  [] Allergic/Immunologic    Explanation:     Vitals:  /83   Pulse 87   Temp 97 °F (36.1 °C) (Oral)   Resp 18   SpO2 98%      Neurologic Exam:   Muscle Strength & Tone: full ROM  Gait: unsteady   Involuntary Movements: yes    Mental Status Examination:    Level of consciousness:  within normal limits   Appearance:  ill-appearing  Behavior/Motor:  psychomotor retardation  Attitude toward examiner:  withdrawn  Speech:  slow   Mood: constricted, decreased range and depressed  Affect:  mood congruent  Thought processes:  slow   Thought content:  Suicidal Ideation:  active  Cognition:  oriented to person, place, and time   Concentration distractible  Memory intact  Insight poor   Judgement poor   Fund of Knowledge adequate    Mini Mental Status 30/30      DIAGNOSIS:     Major depressive disorder; recurrent, severe and without psychotic features   Generalized anxiety disorder           RISK ASSESSMENT:    SUICIDE RISK ASSESSMENT:  HOMICIDE: low  AGITATION/VIOLENCE: low  ELOPEMENT: low    LABS: REVIEWED TODAY:  Recent Labs     05/31/19 1913   WBC 6.9   HGB 13.5*        Recent Labs     05/31/19 1913      K 5.0*      CO2 27   BUN 11   CREATININE 0.57*   GLUCOSE 112*     Recent Labs     05/31/19 1913   BILITOT <0.2   ALKPHOS 95   AST 18   ALT <5     Lab Results   Component Value Date    LABAMPH Negative 03/31/2013    BARBSCNU Negative 03/31/2013    LABBENZ Negative 03/31/2013    OPIATESCREENURINE Negative 03/31/2013    PHENCYCLIDINESCREENURINE Negative 03/31/2013    ETOH 233 03/31/2013     Lab Results   Component Value Date    TSH 2.680 05/31/2019     Lab Results   Component Value Date    LITHIUM 0.4 (L) 03/18/2019     No results found for: VALPROATE, CBMZ  Lab Results   Component Value Date    LITHIUM 0.4 03/18/2019       FURTHER LABS ORDERED :      Radiology   No results found. EKG:TRACING REVIEWED    TREATMENT PLAN:    Risk Management:  close watch and suicide risk    Collateral Information:  Will obtain collateral information from the family or friends. Will obtain medical records as appropriate from out patient providers  Will consult the hospitalist for a physical exam to rule out any co-morbid physical condition.     Home medication Reconciled       New Medications started during this admission :    Medication as ordered  Medical clearance for ECT  Prn Haldol 5mg and Vistaril 50mg q6hr for extreme agitation. Trazodone as ordered for insomnia  Vistaril as ordered for anxiety  Discussed with the patient risk, benefit, alternative and common side effects for the  proposed medication treatment. Patient is consenting to the treatment. Psychotherapy:   Encourage participation in milieu and group therapy  Individual therapy as needed        Behavioral Services  Medicare Certification      Admission Day 1  I certify that this patient's inpatient psychiatric hospital admission is medically necessary for:     (1) treatment which could reasonably be expected to improve this patient's condition, or     (2) diagnostic study or its equivalent.        Electronically signed by Marin Wilburn MD on 6/1/2019 at 9:50 AM

## 2019-06-01 NOTE — PROGRESS NOTES
increased tremors throughout UE/LE upon standing position     Ambulation  Ambulation?: (NT d/t safety and non-ambulatory )    Activity Tolerance  Activity Tolerance: Patient Tolerated treatment well;Patient limited by endurance; Patient limited by pain;Treatment limited secondary to medical complications (free text)(Parkinson, CVAx3)          ASSESSMENT:   Body structures, Functions, Activity limitations: Decreased functional mobility ; Decreased ROM; Decreased strength;Decreased balance;Decreased endurance; Increased Pain  Decision Making: High Complexity  History: high  Exam: high  Clinical Presentation: high    Prognosis: Fair  Patient Education: Inpt POC    DISCHARGE RECOMMENDATIONS:  Discharge Recommendations: Continue to assess pending progress    Assessment: Pt presents with the above functional limitations and impairments currently limiting his functional independence. He would currently benefit from skilled PT in order to further address and improve QOL.    REQUIRES PT FOLLOW UP: Yes      PLAN OF CARE:  Plan  Times per week: 3-6  Current Treatment Recommendations: Strengthening, ROM, Balance Training, Transfer Training, Functional Mobility Training, Endurance Training, Wheelchair Mobility Training, Neuromuscular Re-education, Pain Management, Home Exercise Program, Safety Education & Training, Patient/Caregiver Education & Training, Equipment Evaluation, Education, & procurement, Positioning  Safety Devices  Type of devices: (Pt in power W/C before and after tx in 3 [facility] with nsg staff present )    Goals:  Short term goals  Short term goal 1: Pt will perform bed mob/transfers with Dhruv to increase functional indep  Short term goal 2: Pt will stand at [facility] >/=2min with Dhruv to increase functional activity tolerance  Short term goal 3: Pt will demo improved seated and standing bal >/=1/2 grade to increase safety with functional mobility   Short term goal 4: Pt will demo improved B LE strength as evidenced by increased ease with transfers     [facility] (6 CLICK) [id number] Mobility Raw Score : 10   Therapy Time:   Individual   Time In 1405   Time Out 1425   Minutes BRONWYN/ [address], [address] [facility], 06/01/19 at 2:28 PM

## 2019-06-01 NOTE — PROGRESS NOTES
Pt. refused to attend the 1000 skills group, despite staff encouragement. Electronically signed by Corazon Hernández, [place] [facility] on 6/1/2019 at 11:21 AM

## 2019-06-01 NOTE — PROGRESS NOTES
Patient had a flat affect, was worrisome but pleasant and cooperative. Patient stated he is depressed and is here to try ECT. He has many medical issues. Patient stated he  had 3 strokes, has parkinson and has a lot of pain. Patient is frustrated and wants to feel better. He has poor sleep and poor appetite. He stated he lives at [facility] and he has assistance with his ADL's. He denies being suicidal.  He denies any audio or visual hallucination. He has a good support system. He enjoys watching sports on TV.  Electronically signed by Ria Snellen, [address] on 6/1/2019 at 1:53 PM

## 2019-06-01 NOTE — PROGRESS NOTES
Pt. declined to attend the 0900 community meeting, despite staff encouragement. Electronically signed by Mack Wallace [address] on 6/1/2019 at 10:25 AM

## 2019-06-01 NOTE — H&P
[facility]  MEDICINE    HISTORY AND PHYSICAL EXAM    PATIENT NAME:  Tiffani Witt    MRN:  [id number]  SERVICE DATE:  6/1/2019   SERVICE TIME:  10:13 AM    Primary Care Physician: Felix Quinones MD         SUBJECTIVE  CHIEF COMPLAINT:  Medical clear for inpatient psychiatry admission. Consult for medical H/P encounter/ Medical clearance for ECT treatment     HPI:  This is a 66 y.o. male with PMHx of CVA x3 with left hemiparesis, Parkinson's, CAD, Hep C, MI, HTN   who presents with worsening depression. Pt states he is feeling more depressed and medications don't seem to help and Psychiatrist had suggested that he gets ECT tx. Pt has severe weakness in the right extremities with  braces in both extremities, also ambulates in a motorized wheel chair. He denies any  SOB,HA, dizziness,  CP, N/V, fever, chills, constipation or diarrhea. PAST MEDICAL HISTORY:    Past Medical History:   Diagnosis Date    CAD (coronary artery disease)     CVA (cerebral infarction) 2007, 2008    x2    Hemiplegia as late effect of cerebrovascular accident ([address] 2012    This is heavily debated. MRI shows small vessel CVD and nothing to suggest significant prior stroke to leave hemiplegia. Neurology has suggested malingering.     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HCC)     Pneumonia     Stroke ([address]     X2     PAST SURGICAL HISTORY:    Past Surgical History:   Procedure Laterality Date    BONE RESECTION, RIB      BONY  TUMOR    BONY PELVIS SURGERY  1982    CORONARY ANGIOPLASTY WITH STENT PLACEMENT      X2    CORONARY ANGIOPLASTY WITH STENT PLACEMENT      x2    KIDNEY CYST REMOVAL      benign    KIDNEY SURGERY      TUMOR AND STONE REMOVED     FAMILY HISTORY:    Family History   Problem Relation Age of Onset    Cancer Mother    [facility] High Blood Pressure Mother     Stroke Father     Heart Attack Father     Heart Disease Father     High Blood Pressure Father     Diabetes Other         GM     SOCIAL HISTORY:    Social History     Socioeconomic History    Marital status: Single     Spouse name: Not on file    Number of children: Not on file    Years of education: Not on file    Highest education level: Not on file   Occupational History    Not on file   Social Needs    Financial resource strain: Not on file    Food insecurity:     Worry: Not on file     Inability: Not on file    Transportation needs:     Medical: Not on file     Non-medical: Not on file   Tobacco Use    Smoking status: Former Smoker     Last attempt to quit: 2006     Years since quittin.2    Smokeless tobacco: Never Used   Substance and Sexual Activity    Alcohol use:  Yes     Alcohol/week: 0.6 oz     Types: 1 Cans of beer per week     Comment: occasional    Drug use: No    Sexual activity: Yes     Partners: Female   Lifestyle    Physical activity:     Days per week: Not on file     Minutes per session: Not on file    Stress: Not on file   Relationships    Social connections:     Talks on phone: Not on file     Gets together: Not on file     Attends Tenriism service: Not on file     Active member of club or organization: Not on file     Attends meetings of clubs or organizations: Not on file     Relationship status: Not on file    Intimate partner violence:     Fear of current or ex partner: Not on file     Emotionally abused: Not on file     Physically abused: Not on file     Forced sexual activity: Not on file   Other Topics Concern    Not on file   Social History Narrative         Type of Home: Facility    Home Equipment: Wheelchair-electric    ADL Assistance: Needs assistance    Ambulation Assistance: Needs assistance (pt reports he walks with therapy staff at [facility] +2 with no device)    Transfer Assistance: Independent MEDICATIONS:    Current Facility-Administered Medications   Medication Dose Route Frequency Provider Last Rate Last Dose    acetaminophen (TYLENOL) tablet 650 mg  650 mg Oral 4x Daily Ro Zendejas MD   650 mg at 06/01/19 0929    Apomorphine HCl SOCT 0.4 mL  0.4 mL Subcutaneous Q2H PRN Ro Zendejas MD   0.4 mL at 06/01/19 0334    carbidopa-levodopa (SINEMET)  MG per tablet 1 tablet  1 tablet Oral 5x Daily Ro Zendejas MD   1 tablet at 06/01/19 0621    Carbidopa-Levodopa ER 36. MG CPCR 1 capsule  1 capsule Oral TID Ro Zendejas MD        vitamin D (CHOLECALCIFEROL) tablet 2,000 Units  2,000 Units Oral QAM Ro Zendejas MD   2,000 Units at 06/01/19 0928    cyclobenzaprine (FLEXERIL) tablet 10 mg  10 mg Oral TID PRN Ro Zendejas MD   10 mg at 05/31/19 2046    dipyridamole-aspirin (AGGRENOX)  MG per extended release capsule 1 capsule  1 capsule Oral BID Ro Zendejas MD   1 capsule at 06/01/19 0928    finasteride (PROSCAR) tablet 5 mg  5 mg Oral Daily Ro Zendejas MD   5 mg at 06/01/19 7079    lithium capsule 150 mg  150 mg Oral BID Ro Zendejas MD   150 mg at 06/01/19 0928    mirtazapine (REMERON) tablet 30 mg  30 mg Oral Nightly Ro Zendejas MD   30 mg at 05/31/19 2046    pregabalin (LYRICA) capsule 100 mg  100 mg Oral BID Ro Zendejas MD   100 mg at 06/01/19 0928    prochlorperazine (COMPAZINE) tablet 10 mg  10 mg Oral Q6H PRN Ro Zendejas MD        rotigotine (NEUPRO) 2 MG/24HR 1 patch  1 patch Transdermal Daily Ro Zendejas MD   1 patch at 06/01/19 0929    sennosides-docusate sodium (SENOKOT-S) 8.6-50 MG tablet 2 tablet  2 tablet Oral Nightly Ro Zendejas MD   2 tablet at 05/31/19 2046    tamsulosin (FLOMAX) capsule 0.4 mg  0.4 mg Oral Daily Ro Zendejas MD   0.4 mg at 06/01/19 0721    nicotine polacrilex (NICORETTE) gum 4 mg  4 mg Oral PRN Ro Zendejas MD   4 mg at 05/31/19 2041    discharge for mgmt     Hx urinary retention: denies any voiding problems, continue daily flomax and proscar    Encounter for other general exam        Labs,  V.S and chest Xray unremarkable, EKG result pending, can proceed with ECT if EKG shows no abnormality, also cleared by  Neurologist     VTE Prophylaxis: low risk,pt ambulatory   Code status: full     This is only a history and physical examination and not medical management. The patient is to contact and follow up with their primary care physician and go over any abnormal labs, imaging, findings, medical concerns, or conditions that we have and have not addressed during this encounter. Plan of care discussed with: patient    SIGNATURE: PAMELA Marks - CNP  DATE: June 1, 2019  TIME: 10:13 AM   Attending Supervising Physicians Attestation Statement  I saw and evaluated the patient.   I discussed the findings and plans with nurse practitioner and agree as documented in her note     Electronically signed by Norah Reed MD on 6/1/19 at 5:03 PM

## 2019-06-02 PROCEDURE — 97166 OT EVAL MOD COMPLEX 45 MIN: CPT

## 2019-06-02 PROCEDURE — 6370000000 HC RX 637 (ALT 250 FOR IP): Performed by: NURSE PRACTITIONER

## 2019-06-02 PROCEDURE — 1240000000 HC EMOTIONAL WELLNESS R&B

## 2019-06-02 PROCEDURE — 97535 SELF CARE MNGMENT TRAINING: CPT

## 2019-06-02 PROCEDURE — 6360000002 HC RX W HCPCS: Performed by: INTERNAL MEDICINE

## 2019-06-02 PROCEDURE — 6370000000 HC RX 637 (ALT 250 FOR IP): Performed by: PSYCHIATRY & NEUROLOGY

## 2019-06-02 RX ADMIN — ROTIGOTINE 1 PATCH: 2 PATCH, EXTENDED RELEASE TRANSDERMAL at 08:57

## 2019-06-02 RX ADMIN — PRAVASTATIN SODIUM 20 MG: 10 TABLET ORAL at 21:50

## 2019-06-02 RX ADMIN — ACETAMINOPHEN 650 MG: 325 TABLET ORAL at 08:56

## 2019-06-02 RX ADMIN — APOMORPHINE HYDROCHLORIDE 0.4 ML: 30 INJECTION SUBCUTANEOUS at 14:30

## 2019-06-02 RX ADMIN — SENNOSIDES AND DOCUSATE SODIUM 2 TABLET: 8.6; 5 TABLET ORAL at 21:49

## 2019-06-02 RX ADMIN — ASPIRIN AND DIPYRIDAMOLE 1 CAPSULE: 25; 200 CAPSULE, EXTENDED RELEASE ORAL at 21:50

## 2019-06-02 RX ADMIN — ACETAMINOPHEN 650 MG: 325 TABLET ORAL at 14:20

## 2019-06-02 RX ADMIN — CARBIDOPA AND LEVODOPA 1 TABLET: 25; 100 TABLET ORAL at 14:21

## 2019-06-02 RX ADMIN — FINASTERIDE 5 MG: 5 TABLET, FILM COATED ORAL at 08:57

## 2019-06-02 RX ADMIN — CARBIDOPA AND LEVODOPA 1 TABLET: 25; 100 TABLET ORAL at 05:39

## 2019-06-02 RX ADMIN — APOMORPHINE HYDROCHLORIDE 0.4 ML: 30 INJECTION SUBCUTANEOUS at 09:58

## 2019-06-02 RX ADMIN — PREGABALIN 100 MG: 100 CAPSULE ORAL at 08:56

## 2019-06-02 RX ADMIN — TAMSULOSIN HYDROCHLORIDE 0.4 MG: 0.4 CAPSULE ORAL at 08:57

## 2019-06-02 RX ADMIN — CARBIDOPA AND LEVODOPA 1 TABLET: 25; 100 TABLET ORAL at 23:41

## 2019-06-02 RX ADMIN — APOMORPHINE HYDROCHLORIDE 0.4 ML: 30 INJECTION SUBCUTANEOUS at 20:32

## 2019-06-02 RX ADMIN — LITHIUM CARBONATE 150 MG: 150 CAPSULE, GELATIN COATED ORAL at 21:51

## 2019-06-02 RX ADMIN — CARBIDOPA AND LEVODOPA 1 TABLET: 25; 100 TABLET ORAL at 17:46

## 2019-06-02 RX ADMIN — APOMORPHINE HYDROCHLORIDE 0.4 ML: 30 INJECTION SUBCUTANEOUS at 23:41

## 2019-06-02 RX ADMIN — ACETAMINOPHEN 650 MG: 325 TABLET ORAL at 21:50

## 2019-06-02 RX ADMIN — ACETAMINOPHEN 650 MG: 325 TABLET ORAL at 17:15

## 2019-06-02 RX ADMIN — LITHIUM CARBONATE 150 MG: 150 CAPSULE, GELATIN COATED ORAL at 08:56

## 2019-06-02 RX ADMIN — VITAMIN D, TAB 1000IU (100/BT) 2000 UNITS: 25 TAB at 08:56

## 2019-06-02 RX ADMIN — BUPRENORPHINE AND NALOXONE 1 TABLET: 2; .5 TABLET SUBLINGUAL at 08:56

## 2019-06-02 RX ADMIN — CARBIDOPA AND LEVODOPA 1 TABLET: 25; 100 TABLET ORAL at 11:19

## 2019-06-02 RX ADMIN — PREGABALIN 100 MG: 100 CAPSULE ORAL at 21:49

## 2019-06-02 RX ADMIN — ASPIRIN AND DIPYRIDAMOLE 1 CAPSULE: 25; 200 CAPSULE, EXTENDED RELEASE ORAL at 08:56

## 2019-06-02 RX ADMIN — MIRTAZAPINE 30 MG: 30 TABLET, FILM COATED ORAL at 21:51

## 2019-06-02 ASSESSMENT — PAIN SCALES - GENERAL
PAINLEVEL_OUTOF10: 8
PAINLEVEL_OUTOF10: 7
PAINLEVEL_OUTOF10: 7
PAINLEVEL_OUTOF10: 0
PAINLEVEL_OUTOF10: 5

## 2019-06-02 NOTE — PLAN OF CARE
See OT evaluation for all goals and OT POC.  Electronically signed by JACKIE Castro/L on 6/2/2019 at 4:08 PM

## 2019-06-02 NOTE — CONSULTS
Consults     This is a 66 y.o.  single right-handed white male with PMHx right cerebral strokes in 2004 and 2008 with left hemiparesis, Parkinson's, CAD, Hep C, MI, HTN   who presents with worsening depression. Pt states he is feeling more depressed and medications don't seem to help and Psychiatrist had suggested that he gets ECT tx. Pt has severe weakness in the left extremities with  braces in both extremities, also ambulates in a motorized wheel chair. He denies any  SOB,HA, dizziness,  CP, N/V, fever, chills, constipation or diarrhea.      PAST MEDICAL HISTORY:    Past Medical History        Past Medical History:   Diagnosis Date    CAD (coronary artery disease)      CVA (cerebral infarction) 2007, 2008     x2    Hemiplegia as late effect of cerebrovascular accident ([address] 2012     This is heavily debated. MRI shows small vessel CVD and nothing to suggest significant prior stroke to leave hemiplegia. Neurology has suggested malingering.      Hepatitis C      Hepatitis C 2011    Kidney mass      Leukopenia      MI (myocardial infarction) ([address]      MI (myocardial infarction) ([address] 2009     clean coronaries, no stenting - this is questionable    Osteosarcoma (HCC)      Pneumonia      Stroke ([address]       X2         PAST SURGICAL HISTORY:    Past Surgical History         Past Surgical History:   Procedure Laterality Date    BONE RESECTION, RIB         BONY  TUMOR    BONY PELVIS SURGERY   1982    CORONARY ANGIOPLASTY WITH STENT PLACEMENT         X2    CORONARY ANGIOPLASTY WITH STENT PLACEMENT         x2    KIDNEY CYST REMOVAL         benign    KIDNEY SURGERY         TUMOR AND STONE REMOVED         FAMILY HISTORY:    Family History         Family History   Problem Relation Age of Onset    Cancer Mother      High Blood Pressure Mother      Stroke Father      Heart Attack Father      Heart Disease Father      High Blood Pressure Father      Diabetes Other           GM         SOCIAL HISTORY: Social History               Socioeconomic History    Marital status: Single       Spouse name: Not on file    Number of children: Not on file    Years of education: Not on file    Highest education level: Not on file   Occupational History    Not on file   Social Needs    Financial resource strain: Not on file    Food insecurity:       Worry: Not on file       Inability: Not on file    Transportation needs:       Medical: Not on file       Non-medical: Not on file   Tobacco Use    Smoking status: Former Smoker       Last attempt to quit: 2006       Years since quittin.2    Smokeless tobacco: Never Used   Substance and Sexual Activity    Alcohol use: Yes       Alcohol/week: 0.6 oz       Types: 1 Cans of beer per week       Comment: occasional    Drug use: No    Sexual activity: Yes       Partners: Female   Lifestyle    Physical activity:       Days per week: Not on file       Minutes per session: Not on file    Stress: Not on file   Relationships    Social connections:       Talks on phone: Not on file       Gets together: Not on file       Attends Orthodoxy service: Not on file       Active member of club or organization: Not on file       Attends meetings of clubs or organizations: Not on file       Relationship status: Not on file    Intimate partner violence:       Fear of current or ex partner: Not on file       Emotionally abused: Not on file       Physically abused: Not on file       Forced sexual activity:         No chief complaint on file. Results    Imaging  Xr Chest Standard (2 Vw)    Result Date: 2019  EXAMINATION: XR CHEST (2 VW)  CLINICAL HISTORY:  MEDICAL CLEARANCE FOR ECT COMPARISONS: 2019  FINDINGS: Two views of the chest are submitted. The cardiac silhouette is enlarged. Unchanged. .  The mediastinum is unremarkable. Pulmonary vascular attenuated. Right sided trachea. There are bibasilar areas of atelectasis, scarring. No focal infiltrates.   No effusions. No Pneumothoraces. Old right seventh rib fracture. NO ACUTE ACTIVE CARDIOPULMONARY PROCESS      Labs    CBC:   Recent Labs     05/31/19 1913   WBC 6.9   HGB 13.5*        BMP:    Recent Labs     05/31/19 1913      K 5.0*      CO2 27   BUN 11   CREATININE 0.57*   GLUCOSE 112*     TSH:   Recent Labs     05/31/19 1913   TSH 8.417     Folic Acid: No results for input(s): FOLATE in the last 72 hours. B12:    Lab Results   Component Value Date    [id number] 3635 (H) 01/12/2019     Vit. D: No components found for: VITAMIND  Lipids: No results for input(s): CHOL, TRIG, HDL, LDLCALC in the last 72 hours. Ammonia: No results for input(s): AMMONIA in the last 72 hours. LFT:   Recent Labs     05/31/19 1913   AST 18   ALT <5   BILITOT <0.2   ALKPHOS 95        Urine:   Recent Labs     06/01/19  1449   COLORU DARK YELLOW*   PHUR 5.0   CLARITYU Clear   SPECGRAV 1.027   LEUKOCYTESUR Negative   UROBILINOGEN 0.2   BILIRUBINUR Negative   BLOODU Negative          /79   Pulse 84   Temp 98 °F (36.7 °C) (Oral)   Resp 20   SpO2 96%    Systemic Exam    All the peripheral pulsations are normal    No bruit are heard over the eyeballs, head, neck, abdominal,aorta,iliacs or the femorals. Heart is regular with normal heart sounds and no murmurs. There is no hepatosplenomegaly. Neurological Examination    On Neurological examination, Diana Arrieta is well oriented to day,date, month and the year. Speech, comprehension and expression is intact     Higher cortical functions are normal for patient's age. Both the pupils are equal and react to light. Visual fields are full in the right eye, hearing loss of vision in the left eye temporal area    Extraocular movements are full and the fundi are normal.    Face is symmetrical.    The rest of the cranial nerves are within normal limits.     He has mild increase in the muscle tone and normal muscle mass. No fasciculations or involuntary movements are seen. On Lena testing, there is no pronation or drift. The power is normal in all the muscle groups, etc., probably great 4 in the left limbs. The deep tendon reflexes are 1+  bilaterally with mildly brisker reflexes on the left side. Meyerson's sign is negative    No cortical release signs are seen. The patient's gait and was tested he is using the  motorized wheelchair    Romberg position: could not be performed with the reliability    The patient has grossly normal sensory examination for age            Active Problems:    MDD (major depressive disorder), recurrent episode ([address]    Severe episode of recurrent major depressive disorder, without psychotic features ([address]  Resolved Problems:    * No resolved hospital problems. *           IMPRESSION:  Patient history of a right cerebral strokes 2004, 2008 with a relatively good recovery  Left visual loss temporal segment  Coronary artery disease  History of myocardial infarction  Hepatitis C  Hypertension  Significant depression  Patient needs a ECT  Medically he is clear for ECT      Recommendations:   To continue the patient on his current medications  Physical therapy, occupational therapy while the patient is here  Thank you for the consult  We shall follow the patient with you    Jim Maurice MD

## 2019-06-02 NOTE — PROGRESS NOTES
Dr. Judi Rangel in to see patient. Pt is cleared for ECT treatment per neurology.  Electronically signed by Luis A Pedroza RN on 6/2/19 at 1:50 AM

## 2019-06-02 NOTE — PROGRESS NOTES
Pt c/o generalized pain 8/10 and requesting additional meds. Receives Suboxone daily. Jamas Ahumada, NP was contacted via perfect serve, no additional orders received.  Electronically signed by Leah Gee RN on 6/2/19 at 12:49 AM

## 2019-06-02 NOTE — PROGRESS NOTES
NEUROLOGY INPATIENT PROGRESS NOTE    Patient- Jelly Rosa    MRN -  [id number]   Acct # - [de-identified]      - 1955    66 y.o. Subjective: The patient is seen in follow-up. Patient is alert at this time. Left-sided weakness stable  Speech is clear  Patient doing his a exercises regularly which will help     Result Data:  CBC:   Recent Labs     19   WBC 6.9   HGB 13.5*        BMP:    Recent Labs     19      K 5.0*      CO2 27   BUN 11   CREATININE 0.57*   GLUCOSE 112*     TSH:   Recent Labs     19   TSH 5.248     Folic Acid: No results for input(s): FOLATE in the last 72 hours. B12:    Lab Results   Component Value Date    [id number] 8225 (H) 2019     Vit. D: No components found for: VITAMIND  Lipids: No results for input(s): CHOL, TRIG, HDL, LDLCALC in the last 72 hours. Ammonia: No results for input(s): AMMONIA in the last 72 hours. LFT:   Recent Labs     19   AST 18   ALT <5   BILITOT <0.2   ALKPHOS 95        Urine:   Recent Labs     19  1449   COLORU DARK YELLOW*   PHUR 5.0   CLARITYU Clear   SPECGRAV 1.027   LEUKOCYTESUR Negative   UROBILINOGEN 0.2   BILIRUBINUR Negative   BLOODU Negative        Imaging    Xr Chest Standard (2 Vw)    Result Date: 2019  EXAMINATION: XR CHEST (2 VW)  CLINICAL HISTORY:  MEDICAL CLEARANCE FOR ECT COMPARISONS: 2019  FINDINGS: Two views of the chest are submitted. The cardiac silhouette is enlarged. Unchanged. .  The mediastinum is unremarkable. Pulmonary vascular attenuated. Right sided trachea. There are bibasilar areas of atelectasis, scarring. No focal infiltrates. No effusions. No Pneumothoraces. Old right seventh rib fracture.                                                                                    NO ACUTE ACTIVE CARDIOPULMONARY PROCESS        Objective:   /73   Pulse 82   Temp 98 °F (36.7 °C) (Oral)   Resp 20 episode of recurrent major depressive disorder, without psychotic features [facility])       Assessment/Plan  Patient history of a right cerebral strokes 2004, 2008 with a relatively good recovery  Left visual loss temporal segment hold unstable  Coronary artery disease  History of myocardial infarction  Hepatitis C  Hypertension  Significant depression  Patient needs a ECT  Medically he is clear for ECT        Recommendations: To continue the patient on his current medications  Physical therapy, occupational therapy while the patient is here  Lab reports.             Tracy Rodriguez MD, 6/2/2019 7:58 PM

## 2019-06-02 NOTE — FLOWSHEET NOTE
Pt has been visible on unit. Flat/sad in appearance but brightens on approach. Visiting with girlfriend. Denies SI/HI/AVH and anxiety. Complains of poor sleep due to pain. Educated on pain management consult. Well groomed. Complaining of leg swelling, states he wears shelton hose at the nursing home but does not have them here. Will obtain today.

## 2019-06-02 NOTE — PROGRESS NOTES
Pt. refused to attend the 1000 skills group, despite staff encouragement. Electronically signed by David [address] on 6/2/2019 at 2:08 PM

## 2019-06-02 NOTE — PROGRESS NOTES
Out and social with select peers and frequenting the phone. Does not appear to be in any distress. Does most of own care, requests help when needed. Hopeful for sleep tonight as first ECT tx tomorrow. Pain in shoulders remains around 7-8/10.

## 2019-06-02 NOTE — GROUP NOTE
Group Therapy Note    Date: June 1    Group Start Time: 1640  Group End Time: 7143  Group Topic: Healthy Living/Wellness    [facility]    Olivia Began        Group Therapy Note    Attendees: 13    Pt did not attend group on happiness and inspirations.

## 2019-06-02 NOTE — PROGRESS NOTES
[facility] OCCUPATIONAL THERAPY EVALUATION - ACUTE     Date: 2019  Patient Name: Diana Arrieta        MRN: [id number]  Account: [de-identified]   : 1955  (66 y.o.)  Room: [place] [address]    Chart Review:  Diagnosis:  There were no encounter diagnoses. Past Medical History:   Diagnosis Date    CAD (coronary artery disease)     CVA (cerebral infarction) , 2008    x2    Hemiplegia as late effect of cerebrovascular accident ([address] 2012    This is heavily debated. MRI shows small vessel CVD and nothing to suggest significant prior stroke to leave hemiplegia. Neurology has suggested malingering.  Hepatitis C     Hepatitis C     Kidney mass     Leukopenia     MI (myocardial infarction) ([address]     MI (myocardial infarction) ([address] 2009    clean coronaries, no stenting - this is questionable    Osteosarcoma (HCC)     Pneumonia     Stroke ([facility]     X2     Past Surgical History:   Procedure Laterality Date    BONE RESECTION, RIB      BONY  TUMOR    BONY PELVIS SURGERY  1982    CORONARY ANGIOPLASTY WITH STENT PLACEMENT      X2    CORONARY ANGIOPLASTY WITH STENT PLACEMENT      x2    KIDNEY CYST REMOVAL      benign    KIDNEY SURGERY      TUMOR AND STONE REMOVED       Restrictions  Restrictions/Precautions: Fall Risk     Safety Devices: Safety Devices  Safety Devices in place: Yes  Type of devices:  All fall risk precautions in place    Subjective  Pre Treatment Pain Screening  Pain at present: 0  Scale Used: Numeric Score  Intervention List: Patient able to continue with treatment, Patient declined any intervention    Pain Reassessment:   Pain Assessment  Patient Currently in Pain: No  Pain Assessment: 0-10  Pain Level: 0       Orientation  Orientation  Overall Orientation Status: Within Normal Limits    Prior Level of Function:  Social/Functional History  Type of Home: Facility([facility]  Home Layout: One level  Home Access: Level entry  Bathroom Shower/Tub: Walk-in shower, Shower chair with back  Bathroom Toilet: Handicap height  Bathroom Equipment: Grab bars in shower, Shower chair, Grab bars around toilet  Bathroom Accessibility: Wheelchair accessible  Home Equipment: [place] Help From: Other (comment)(SNF)  ADL Assistance: Needs assistance  Homemaking Assistance: Needs assistance  Ambulation Assistance: (W/c)  Transfer Assistance: Needs assistance  Active : No  IADL Comments: Pt was receiving OT at [facility] prior to admission, does not walk, uses power W/C for primary mode of locomotion. Pt. reports assist with majority of ADLs throughout day    OBJECTIVE:     Orientation Status:  Orientation  Overall Orientation Status: Within Normal Limits    Observation:  Observation/Palpation  Posture: Good  Observation: L knee and UE bracing donned which pt requires for functional mobility     Cognition Status:  Cognition  Overall Cognitive Status: WFL  Cognition Comment: Mild safety concerns at times    Perception Status:  Perception  Overall Perceptual Status: WFL    Sensation Status:  Sensation  Overall Sensation Status: WFL    Vision and Hearing Status:  Vision  Vision: Impaired  Vision Exceptions: Wears glasses at all times  Hearing  Hearing: Within functional limits     ROM:   LUE AROM (degrees)  LUE General AROM: LUE limited in active mobility  Left Hand AROM (degrees)  Left Hand General AROM: LUE limited in active mobility  RUE AROM (degrees)  RUE AROM : WFL  Right Hand AROM (degrees)  Right Hand AROM: WFL    Strength:  LUE Strength  LUE Strength Comment: DNT LUE, limited LUE strength  RUE Strength  Gross RUE Strength: Exceptions to [facility]  R Hand Grasp: 3+/5  R Hand Release: 3+/5  RUE Strength Comment: 3+/5 all planes    Coordination, Tone, Quality of Movement:    Tone RUE  RUE Tone: Normotonic  Tone LUE  LUE Tone: Normotonic  Coordination  Movements Are Fluid And Coordinated: No  Coordination and Movement description: Fine motor impairments, Gross motor impairments, Ataxia, Tremors, Decreased speed, Decreased accuracy, Right UE  Quality of Movement Other  Comment: Pt. states CARRIE is 'dead' but was able to utilize it minimally during transfers for mobility assist.     Hand Dominance:  Hand Dominance  Hand Dominance: Right    ADL Status:  ADL  Feeding: Independent  Grooming: Setup  UE Bathing: Minimal assistance  LE Bathing: Maximum assistance  UE Dressing: Moderate assistance  LE Dressing: Maximum assistance  Toileting: Maximum assistance  Additional Comments: Simulated ADLs as above. Practiced toilet transfer with BSC next to bed; pt. reports feeling unsafe toileting in bathroom stall in his room due to positioning. Pt. demonstrated ability to manipulate his leg brace for independence with toileting. Pt. limited by his mobility in L arm and leg for performance of ADLs. Toilet Transfers  Toilet - Technique: Stand pivot  Equipment Used: Drop-arm commode  Toilet Transfer: Minimal assistance  Toilet Transfers Comments: Min A to keep commode steady and to block L knee for transfers with LLE brace undone       Functional Mobility:  Functional Mobility  Functional Mobility Comments: Transfer only; pt. does not ambulate  Transfers  Sit to stand: Contact guard assistance  Stand to sit: Contact guard assistance    Bed Mobility  Bed mobility  Comment: DNT bed mobility, pt up in chair at start and end of eval but pt. reports feeling he is close to his baseline    Seated and Standing Balance:  Balance  Sitting Balance: Modified independent   Standing Balance: Minimal assistance    Functional Endurance:  Activity Tolerance  Activity Tolerance: Patient Tolerated treatment well     Educated pt. on different techniques for toilet transfer to [facility]. Pt. reports improved comfort with transfers when [facility] situated in bed room vs. bathroom cubicle. Pt. also educated on mobility techniques in room due to new position of bed in hospital room vs. SNF room. Pt. reports G understanding.      D/C Recommendations:    Equipment Recommendations:  Continue to assess    OT Follow Up:  OT D/C RECOMMENDATIONS  REQUIRES OT FOLLOW UP: Yes       Assessment/Discharge Disposition:  Assessment: Pt. is a 66year old man from SNF who presents to [facility] with the above deficits which impact his ability to perform ADLs and mobility. Pt. would benefit from skilled OT to maximize independence and safety with ADL tasks.    Performance deficits / Impairments: Decreased functional mobility , Decreased ADL status, Decreased balance, Decreased endurance, Decreased high-level IADLs, Decreased fine motor control, Decreased coordination  Prognosis: Good  Discharge Recommendations: Continue to assess pending progress  History: Pt's medical history is moderately complex  Exam: Pt. has 7 performance deficits  Assistance / Modification: Pt. requires mod A    Six Click Score   How much help for putting on and taking off regular lower body clothing?: Total  How much help for Bathing?: A Lot  How much help for Toileting?: A Lot  How much help for putting on and taking off regular upper body clothing?: A Lot  How much help for taking care of personal grooming?: A Little  How much help for eating meals?: A Little  AM-[facility] Inpatient Daily Activity Raw Score: 13  AM-PAC Inpatient ADL T-Scale Score : 32.03  ADL Inpatient CMS 0-100% Score: 63.03    Plan:  Plan  Times per week: 1-3x  Plan weeks: Length of acute stay  Current Treatment Recommendations: Strengthening, Balance Training, Functional Mobility Training, Endurance Training, Neuromuscular Re-education, Patient/Caregiver Education & Training, Equipment Evaluation, Education, & procurement, Self-Care / ADL, Safety Education & Training, Positioning    Goals:   Patient will:    - Improve functional endurance to tolerate/complete 60 mins of ADL's  - Be Min A in UB ADLs   - Be Min A in LB ADLs  - Be SBA in ADL transfers without LOB  - Be Min A in toileting tasks  - Improve R hand fine motor coordination to [facility] in order to manage clothing fasteners/self-care containers in a timely manner  - Improve R UE strength and endurance to 4-/5 in order to participate in self-care activities as projected. - Access appropriate D/C site with as few architectural barriers as possible. - Sequence self-care tasks with no verbal cues for technique    Patient Goal: Patient goals : \"I want to keep myself moving as best I can\"     Discussed and agreed upon: Yes Comments:     Therapy Time:   OT Individual Minutes  Time In: 1525  Time Out: 1550  Minutes: 25   10 minutes ADL education    Electronically signed by:     JERRY Bay  6/2/2019, 4:06 PM

## 2019-06-02 NOTE — PROGRESS NOTES
 Smoking status: Former Smoker     Last attempt to quit: 2006     Years since quittin.2    Smokeless tobacco: Never Used   Substance and Sexual Activity    Alcohol use: Yes     Alcohol/week: 0.6 oz     Types: 1 Cans of beer per week     Comment: occasional    Drug use: No    Sexual activity: Yes     Partners: Female   Lifestyle    Physical activity:     Days per week: Not on file     Minutes per session: Not on file    Stress: Not on file   Relationships    Social connections:     Talks on phone: Not on file     Gets together: Not on file     Attends Judaism service: Not on file     Active member of club or organization: Not on file     Attends meetings of clubs or organizations: Not on file     Relationship status: Not on file    Intimate partner violence:     Fear of current or ex partner: Not on file     Emotionally abused: Not on file     Physically abused: Not on file     Forced sexual activity: Not on file   Other Topics Concern    Not on file   Social History Narrative         Type of Home: Facility    Home Equipment: Wheelchair-electric    ADL Assistance: Needs assistance    Ambulation Assistance: Needs assistance (pt reports he walks with therapy staff at [facility] +2 with no device)    Transfer Assistance: Independent                     ROS:  [x] All negative/unchanged except if checked.  Explain positive(checked items) below:  [] Constitutional  [] Eyes  [] Ear/Nose/Mouth/Throat  [] Respiratory  [] CV  [] GI  []   [] Musculoskeletal  [] Skin/Breast  [] Neurological  [] Endocrine  [] Heme/Lymph  [] Allergic/Immunologic    Explanation:     MEDICATIONS:    Current Facility-Administered Medications:     pravastatin (PRAVACHOL) tablet 20 mg, 20 mg, Oral, Nightly, PAMELA Melo - CNP, 20 mg at 19    acetaminophen (TYLENOL) tablet 650 mg, 650 mg, Oral, 4x Daily, Weston Billingsley MD, 650 mg at 19 0856    Apomorphine HCl SOCT 0.4 mL, 0.4 mL, Subcutaneous, Q2H PRN, José Miguel Cheek MD, 0.4 mL at 06/02/19 0958    carbidopa-levodopa (SINEMET)  MG per tablet 1 tablet, 1 tablet, Oral, 5x Daily, José Miguel Cheek MD, 1 tablet at 06/02/19 0539    Carbidopa-Levodopa ER 36. MG CPCR 1 capsule, 1 capsule, Oral, TID, José Miguel Cheek MD    vitamin D (CHOLECALCIFEROL) tablet 2,000 Units, 2,000 Units, Oral, QAM, José Miguel Cheek MD, 2,000 Units at 06/02/19 0856    cyclobenzaprine (FLEXERIL) tablet 10 mg, 10 mg, Oral, TID PRN, José Miguel Cheek MD, 10 mg at 05/31/19 2046    dipyridamole-aspirin (AGGRENOX)  MG per extended release capsule 1 capsule, 1 capsule, Oral, BID, José Miguel Cheek MD, 1 capsule at 06/02/19 0856    finasteride (PROSCAR) tablet 5 mg, 5 mg, Oral, Daily, José Miguel Cheek MD, 5 mg at 06/02/19 0857    lithium capsule 150 mg, 150 mg, Oral, BID, José Miguel Cheek MD, 150 mg at 06/02/19 0856    mirtazapine (REMERON) tablet 30 mg, 30 mg, Oral, Nightly, José Miguel Cheek MD, 30 mg at 06/01/19 2049    pregabalin (LYRICA) capsule 100 mg, 100 mg, Oral, BID, José Miguel Cheek MD, 100 mg at 06/02/19 0856    prochlorperazine (COMPAZINE) tablet 10 mg, 10 mg, Oral, Q6H PRN, José Miguel Cheek MD    rotigotine (NEUPRO) 2 MG/24HR 1 patch, 1 patch, Transdermal, Daily, José Miguel Cheek MD, 1 patch at 06/02/19 0857    sennosides-docusate sodium (SENOKOT-S) 8.6-50 MG tablet 2 tablet, 2 tablet, Oral, Nightly, José Miguel Cheek MD, 2 tablet at 06/01/19 2048    tamsulosin (FLOMAX) capsule 0.4 mg, 0.4 mg, Oral, Daily, José Miguel Cheek MD, 0.4 mg at 06/02/19 0857    nicotine polacrilex (NICORETTE) gum 4 mg, 4 mg, Oral, PRN, José Miguel Cheek MD, 4 mg at 05/31/19 2047    buprenorphine-naloxone (SUBOXONE) 2-0.5 MG SL tablet 1 tablet, 1 tablet, Sublingual, Daily, Selina Craig DO, 1 tablet at 06/02/19 0856      Examination:  /73   Pulse 82   Temp 98 °F (36.7 °C) (Oral)   Resp 20   SpO2 97%   Gait - unsteady  Medication side effects(SE): no    Mental Status Examination:    Level of consciousness:  within normal limits   Appearance:  poor grooming and fair hygiene  Behavior/Motor:  psychomotor retardation  Attitude toward examiner:  cooperative  Speech:  slow   Mood: depressed  Affect:  blunted  Thought processes:  slow   Thought content:  Suicidal Ideation:  passive  Delusions:  no evidence of delusions  Perceptual Disturbance:  denies any perceptual disturbance  Cognition:  oriented to person, place, and time   Concentration intact  Insight fair   Judgement fair     ASSESSMENT:   Patient symptoms are:  [] Well controlled  [] Improving  [] Worsening  [] No change      Diagnosis:   Active Problems:    MDD (major depressive disorder), recurrent episode ([address]    Severe episode of recurrent major depressive disorder, without psychotic features ([address]  Resolved Problems:    * No resolved hospital problems. *      LABS:    Recent Labs     05/31/19 1913   WBC 6.9   HGB 13.5*        Recent Labs     05/31/19 1913      K 5.0*      CO2 27   BUN 11   CREATININE 0.57*   GLUCOSE 112*     Recent Labs     05/31/19 1913   BILITOT <0.2   ALKPHOS 95   AST 18   ALT <5     Lab Results   Component Value Date    LABAMPH Neg 06/01/2019    BARBSCNU Neg 06/01/2019    LABBENZ POSITIVE 06/01/2019    OPIATESCREENURINE POSITIVE 06/01/2019    PHENCYCLIDINESCREENURINE Neg 06/01/2019    ETOH 233 03/31/2013     Lab Results   Component Value Date    TSH 2.680 05/31/2019     Lab Results   Component Value Date    LITHIUM 0.4 (L) 03/18/2019     No results found for: VALPROATE, CBMZ      Treatment Plan:  Reviewed current Medications with the patient. ECT # 1 tomorrorw  Risks, benefits, side effects, drug-to-drug interactions and alternatives to treatment were discussed. Collateral information:   CD evaluation  Encourage patient to attend group and other milieu activities.   Discharge planning discussed with the patient and treatment team.    PSYCHOTHERAPY/COUNSELING:  [x] Therapeutic interview  [x] Supportive  [] CBT  [] Ongoing  [] Other    [x] Patient continues to need, on a daily basis, active treatment furnished directly by or requiring the supervision of inpatient psychiatric personnel      Anticipated Length of stay:            Electronically signed by Curlee Galeazzi, MD on 6/2/2019 at 10:35 AM

## 2019-06-02 NOTE — GROUP NOTE
Group Therapy Note    Date: June 1    Group Start Time: 2045  Group End Time: 2100  Group Topic: Wrap-Up    [facility] 3W [facility]IA GUADALUPE [place]        Group Therapy Note    Attendees: 11    Pt did not attend group.

## 2019-06-03 ENCOUNTER — ANESTHESIA EVENT (OUTPATIENT)
Dept: POSTOP/PACU | Age: 64
End: 2019-06-03

## 2019-06-03 ENCOUNTER — APPOINTMENT (OUTPATIENT)
Dept: POSTOP/PACU | Age: 64
DRG: 751 | End: 2019-06-03
Attending: PSYCHIATRY & NEUROLOGY
Payer: COMMERCIAL

## 2019-06-03 ENCOUNTER — ANESTHESIA (OUTPATIENT)
Dept: POSTOP/PACU | Age: 64
End: 2019-06-03

## 2019-06-03 VITALS
DIASTOLIC BLOOD PRESSURE: 75 MMHG | SYSTOLIC BLOOD PRESSURE: 138 MMHG | OXYGEN SATURATION: 93 % | RESPIRATION RATE: 7 BRPM

## 2019-06-03 PROBLEM — G20 PRIMARY PARKINSONISM (HCC): Status: ACTIVE | Noted: 2019-06-03

## 2019-06-03 PROBLEM — M62.838 MUSCLE SPASTICITY: Status: ACTIVE | Noted: 2019-06-03

## 2019-06-03 PROBLEM — G20.A1 PARKINSON DISEASE, SYMPTOMATIC: Status: ACTIVE | Noted: 2019-06-03

## 2019-06-03 PROBLEM — G20.C PRIMARY PARKINSONISM: Status: RESOLVED | Noted: 2019-06-03 | Resolved: 2019-06-03

## 2019-06-03 PROBLEM — G20 PRIMARY PARKINSONISM (HCC): Status: RESOLVED | Noted: 2019-06-03 | Resolved: 2019-06-03

## 2019-06-03 PROBLEM — G20 PARKINSON DISEASE, SYMPTOMATIC (HCC): Status: ACTIVE | Noted: 2019-06-03

## 2019-06-03 PROBLEM — M79.10 GENERALIZED MUSCLE ACHE: Status: ACTIVE | Noted: 2019-06-03

## 2019-06-03 PROBLEM — G89.29 CHRONIC PAIN: Status: ACTIVE | Noted: 2019-06-03

## 2019-06-03 PROBLEM — G20.C PRIMARY PARKINSONISM: Status: ACTIVE | Noted: 2019-06-03

## 2019-06-03 PROCEDURE — 6370000000 HC RX 637 (ALT 250 FOR IP): Performed by: PSYCHIATRY & NEUROLOGY

## 2019-06-03 PROCEDURE — 2500000003 HC RX 250 WO HCPCS: Performed by: ANESTHESIOLOGY

## 2019-06-03 PROCEDURE — 1240000000 HC EMOTIONAL WELLNESS R&B

## 2019-06-03 PROCEDURE — 90870 ELECTROCONVULSIVE THERAPY: CPT

## 2019-06-03 PROCEDURE — 7100000001 HC PACU RECOVERY - ADDTL 15 MIN

## 2019-06-03 PROCEDURE — 7100000000 HC PACU RECOVERY - FIRST 15 MIN

## 2019-06-03 PROCEDURE — 6370000000 HC RX 637 (ALT 250 FOR IP): Performed by: ANESTHESIOLOGY

## 2019-06-03 PROCEDURE — 90870 ELECTROCONVULSIVE THERAPY: CPT | Performed by: PSYCHIATRY & NEUROLOGY

## 2019-06-03 PROCEDURE — 3700000000 HC ANESTHESIA ATTENDED CARE

## 2019-06-03 PROCEDURE — 6360000002 HC RX W HCPCS: Performed by: ANESTHESIOLOGY

## 2019-06-03 RX ORDER — ACETAMINOPHEN 325 MG/1
650 TABLET ORAL EVERY 4 HOURS PRN
Status: DISCONTINUED | OUTPATIENT
Start: 2019-06-03 | End: 2019-06-06 | Stop reason: HOSPADM

## 2019-06-03 RX ORDER — BACLOFEN 10 MG/1
5 TABLET ORAL EVERY 6 HOURS PRN
Status: DISCONTINUED | OUTPATIENT
Start: 2019-06-03 | End: 2019-06-06 | Stop reason: HOSPADM

## 2019-06-03 RX ORDER — MORPHINE SULFATE 15 MG/1
15 TABLET, FILM COATED, EXTENDED RELEASE ORAL EVERY 8 HOURS SCHEDULED
Status: DISCONTINUED | OUTPATIENT
Start: 2019-06-03 | End: 2019-06-06 | Stop reason: HOSPADM

## 2019-06-03 RX ORDER — MORPHINE SULFATE 15 MG/1
15 TABLET, FILM COATED, EXTENDED RELEASE ORAL EVERY 12 HOURS SCHEDULED
Status: DISCONTINUED | OUTPATIENT
Start: 2019-06-03 | End: 2019-06-03

## 2019-06-03 RX ORDER — 0.9 % SODIUM CHLORIDE 0.9 %
500 INTRAVENOUS SOLUTION INTRAVENOUS ONCE
Status: DISCONTINUED | OUTPATIENT
Start: 2019-06-03 | End: 2019-06-03

## 2019-06-03 RX ORDER — LIDOCAINE 4 G/G
3 PATCH TOPICAL DAILY
Status: DISCONTINUED | OUTPATIENT
Start: 2019-06-04 | End: 2019-06-06 | Stop reason: HOSPADM

## 2019-06-03 RX ORDER — LIDOCAINE 4 G/G
1 PATCH TOPICAL DAILY
Status: DISCONTINUED | OUTPATIENT
Start: 2019-06-03 | End: 2019-06-03

## 2019-06-03 RX ORDER — SUCCINYLCHOLINE/SOD CL,ISO/PF 100 MG/5ML
SYRINGE (ML) INTRAVENOUS PRN
Status: DISCONTINUED | OUTPATIENT
Start: 2019-06-03 | End: 2019-06-03 | Stop reason: SDUPTHER

## 2019-06-03 RX ORDER — ONDANSETRON 2 MG/ML
INJECTION INTRAMUSCULAR; INTRAVENOUS PRN
Status: DISCONTINUED | OUTPATIENT
Start: 2019-06-03 | End: 2019-06-03 | Stop reason: SDUPTHER

## 2019-06-03 RX ORDER — GLYCOPYRROLATE 1 MG/5 ML
SYRINGE (ML) INTRAVENOUS PRN
Status: DISCONTINUED | OUTPATIENT
Start: 2019-06-03 | End: 2019-06-03 | Stop reason: SDUPTHER

## 2019-06-03 RX ORDER — SUMATRIPTAN 50 MG/1
50 TABLET, FILM COATED ORAL DAILY PRN
Status: DISCONTINUED | OUTPATIENT
Start: 2019-06-03 | End: 2019-06-06 | Stop reason: HOSPADM

## 2019-06-03 RX ORDER — SODIUM POLYSTYRENE SULFONATE 4.1 MEQ/G
15 POWDER, FOR SUSPENSION ORAL; RECTAL ONCE
Status: COMPLETED | OUTPATIENT
Start: 2019-06-03 | End: 2019-06-03

## 2019-06-03 RX ADMIN — PREGABALIN 100 MG: 100 CAPSULE ORAL at 14:29

## 2019-06-03 RX ADMIN — CYCLOBENZAPRINE HYDROCHLORIDE 10 MG: 10 TABLET, FILM COATED ORAL at 14:30

## 2019-06-03 RX ADMIN — Medication 100 MG: at 12:23

## 2019-06-03 RX ADMIN — MIRTAZAPINE 30 MG: 30 TABLET, FILM COATED ORAL at 20:37

## 2019-06-03 RX ADMIN — MORPHINE SULFATE 15 MG: 15 TABLET, FILM COATED, EXTENDED RELEASE ORAL at 14:29

## 2019-06-03 RX ADMIN — PREGABALIN 100 MG: 100 CAPSULE ORAL at 20:37

## 2019-06-03 RX ADMIN — ACETAMINOPHEN 650 MG: 325 TABLET ORAL at 14:30

## 2019-06-03 RX ADMIN — ASPIRIN AND DIPYRIDAMOLE 1 CAPSULE: 25; 200 CAPSULE, EXTENDED RELEASE ORAL at 14:29

## 2019-06-03 RX ADMIN — Medication 0.4 MG: at 12:23

## 2019-06-03 RX ADMIN — VITAMIN D, TAB 1000IU (100/BT) 2000 UNITS: 25 TAB at 14:29

## 2019-06-03 RX ADMIN — ROTIGOTINE 1 PATCH: 2 PATCH, EXTENDED RELEASE TRANSDERMAL at 14:27

## 2019-06-03 RX ADMIN — CARBIDOPA AND LEVODOPA 1 TABLET: 25; 100 TABLET ORAL at 14:29

## 2019-06-03 RX ADMIN — FINASTERIDE 5 MG: 5 TABLET, FILM COATED ORAL at 14:28

## 2019-06-03 RX ADMIN — PRAVASTATIN SODIUM 20 MG: 10 TABLET ORAL at 20:37

## 2019-06-03 RX ADMIN — APOMORPHINE HYDROCHLORIDE 0.4 ML: 30 INJECTION SUBCUTANEOUS at 04:54

## 2019-06-03 RX ADMIN — SODIUM POLYSTYRENE SULFONATE 15 G: 1 POWDER ORAL; RECTAL at 14:43

## 2019-06-03 RX ADMIN — SENNOSIDES AND DOCUSATE SODIUM 2 TABLET: 8.6; 5 TABLET ORAL at 20:36

## 2019-06-03 RX ADMIN — ASPIRIN AND DIPYRIDAMOLE 1 CAPSULE: 25; 200 CAPSULE, EXTENDED RELEASE ORAL at 20:37

## 2019-06-03 RX ADMIN — CARBIDOPA AND LEVODOPA 1 TABLET: 25; 100 TABLET ORAL at 20:36

## 2019-06-03 RX ADMIN — APOMORPHINE HYDROCHLORIDE 0.4 ML: 30 INJECTION SUBCUTANEOUS at 14:09

## 2019-06-03 RX ADMIN — APOMORPHINE HYDROCHLORIDE 0.4 ML: 30 INJECTION SUBCUTANEOUS at 10:30

## 2019-06-03 RX ADMIN — TAMSULOSIN HYDROCHLORIDE 0.4 MG: 0.4 CAPSULE ORAL at 14:28

## 2019-06-03 RX ADMIN — APOMORPHINE HYDROCHLORIDE 0.4 ML: 30 INJECTION SUBCUTANEOUS at 18:57

## 2019-06-03 RX ADMIN — ONDANSETRON 4 MG: 2 INJECTION INTRAMUSCULAR; INTRAVENOUS at 12:23

## 2019-06-03 RX ADMIN — LITHIUM CARBONATE 150 MG: 150 CAPSULE, GELATIN COATED ORAL at 14:28

## 2019-06-03 RX ADMIN — MORPHINE SULFATE 15 MG: 15 TABLET, FILM COATED, EXTENDED RELEASE ORAL at 21:26

## 2019-06-03 RX ADMIN — METHOHEXITAL SODIUM 100 MG: 500 INJECTION, POWDER, LYOPHILIZED, FOR SOLUTION INTRAMUSCULAR; INTRAVENOUS; RECTAL at 12:23

## 2019-06-03 RX ADMIN — LITHIUM CARBONATE 150 MG: 150 CAPSULE, GELATIN COATED ORAL at 20:37

## 2019-06-03 ASSESSMENT — PAIN SCALES - GENERAL
PAINLEVEL_OUTOF10: 9
PAINLEVEL_OUTOF10: 5
PAINLEVEL_OUTOF10: 7

## 2019-06-03 ASSESSMENT — ENCOUNTER SYMPTOMS
ALLERGIC/IMMUNOLOGIC NEGATIVE: 1
EYES NEGATIVE: 1
GASTROINTESTINAL NEGATIVE: 1
BACK PAIN: 1
RESPIRATORY NEGATIVE: 1
SHORTNESS OF BREATH: 1

## 2019-06-03 NOTE — ANESTHESIA POSTPROCEDURE EVALUATION
Department of Anesthesiology  Postprocedure Note    Patient: Paula Santoyo  MRN: [id number]  YOB: 1955  Date of evaluation: 6/3/2019  Time:  12:43 PM     Procedure Summary     Date:  06/03/19 Room / Location:  [facility] PACU    Anesthesia Start:  1222 Anesthesia Stop:  1228    Procedure:  ECT W/ ANESTHESIA Diagnosis:       MDD (major depressive disorder), recurrent episode ([facility]      Severe episode of recurrent major depressive disorder, without psychotic features ([facility]    Scheduled Providers:   Responsible Provider:  Elizabeth Guerrier MD    Anesthesia Type:  general ASA Status:  3          Anesthesia Type: general    Francie Phase I: Francie Score: 9    Francie Phase II:      Last vitals: Reviewed and per EMR flowsheets.        Anesthesia Post Evaluation    Patient participation: complete - patient participated  Level of consciousness: awake  Airway patency: patent  Nausea & Vomiting: no nausea and no vomiting  Complications: no  Cardiovascular status: blood pressure returned to baseline  Respiratory status: acceptable  Hydration status: euvolemic

## 2019-06-03 NOTE — CONSULTS
INITIAL CONSULT -PAIN MANAGEMENT   Psychiatry Department    SERVICE DATE:  6/3/2019   SERVICE TIME:  2:41 PM  Admission date 5/31/2019  REASON Allen Chiqui:  Generalized pain  REQUESTING PHYSICIAN:  Dr. Carmita Brooke MD      Chief complaint: Accelerated depression, requiring ECT therapy  Patient is complaining of generalized pain and muscle spasm    HISTORY OF PRESENTILLNESS:  Mr. Ajay Lynn is a 66 y.o. male who presents for increasing depression requiring ECT treatment due to worsening depression. Patient has multiple medical problem including Parkinson's disease with pain progressing also coronary artery disease, hepatitis C, hypertension, he has also couple of CVAs with left hemiparesis. Patient lives at [address] [place] [address], his been on chronic opioid therapy was Embeda 20 mg every 12 hours which she seemed to be barely controlling his pain also his been taking Tylenol for breakthrough pain and Flexeril for muscle spasm    PAIN  ASSESSMENT:    Constant, generalized muscle spasm    aching and throbbing    pain is perceived as severe (6-8 pain scale), pain is excruciating , incapacitating and unbearable (9-10 pain scale)    PAST MEDICAL HISTORY:    Past Medical History:   Diagnosis Date    CAD (coronary artery disease)     CVA (cerebral infarction) 2007, 2008    x2    Hemiplegia as late effect of cerebrovascular accident ([address] 2012    This is heavily debated. MRI shows small vessel CVD and nothing to suggest significant prior stroke to leave hemiplegia. Neurology has suggested malingering.     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[address]     Pneumonia     Stroke ([address]     X2     PAST SURGICAL HISTORY:    Past Surgical History:   Procedure Laterality Date    BONE RESECTION, RIB Concern    Not on file   Social History Narrative         Type of Home: Facility    Home Equipment: Wheelchair-electric    ADL Assistance: Needs assistance    Ambulation Assistance: Needs assistance (pt reports he walks with therapy staff at [facility] +2 with no device)    Transfer Assistance: Independent               PSYCHOLOGICAL HISTORY: Generalized depression  Patient denies a history of alcohol abuse drug abuse or any prescription drug abuse    MEDICATIONS:  Medications Prior to Admission: Apomorphine HCl (APOKYN) 30 MG/3ML SOCT, Inject 0.4 mLs into the skin every 2 hours as needed (must be given two hours apart (not to exceed a total of 5 doses a day)) Must be given 2 hours apart. Not to exceed 5 doses per day. dipyridamole-aspirin (AGGRENOX)  MG per extended release capsule, Take 1 capsule by mouth 2 times daily  Menthol, Topical Analgesic, (BIOFREEZE) 4 % GEL, Apply topically 3 times daily  Carbidopa-Levodopa ER (RYTARY) 36. MG CPCR, Take 1 capsule by mouth 3 times daily  sennosides-docusate sodium (SENOKOT-S) 8.6-50 MG tablet, Take 2 tablets by mouth nightly  morphine-naltrexone (EMBEDA) 20-0.8 MG CPCR, Take 1 capsule by mouth 2 times daily. .  diazepam (VALIUM) 5 MG tablet, Take 5 mg by mouth 2 times daily as needed for Anxiety. sertraline (ZOLOFT) 50 MG tablet, Take 50 mg by mouth daily  tamsulosin (FLOMAX) 0.4 MG capsule, Take 1 capsule by mouth daily  finasteride (PROSCAR) 5 MG tablet, Take 1 tablet by mouth daily  pregabalin (LYRICA) 100 MG capsule, Take 1 capsule by mouth 2 times daily for 30 days. .  rotigotine (NEUPRO) 2 MG/24HR, Place 1 patch onto the skin daily  lithium 300 MG tablet, Take 150 mg by mouth 2 times daily   cyclobenzaprine (FLEXERIL) 10 MG tablet, Take 10 mg by mouth 3 times daily as needed for Muscle spasms  mirtazapine (REMERON) 15 MG tablet, Take 30 mg by mouth nightly   carbidopa-levodopa (SINEMET)  MG per tablet, Take 1 tablet by mouth 5 times daily desvenlafaxine succinate (PRISTIQ) 25 MG TB24 extended release tablet, Take 25 mg by mouth daily  zolpidem (AMBIEN) 5 MG tablet, Take 5 mg by mouth nightly as needed for Sleep. .  vitamin B-12 (CYANOCOBALAMIN) 1000 MCG tablet, Take 1,000 mcg by mouth daily  acetaminophen (TYLENOL) 325 MG tablet, Take 650 mg by mouth 4 times daily   Thiamine HCl (VITAMIN B-1 PO), Take 100 mg by mouth every morning   Cholecalciferol (VITAMIN D3) 2000 units TABS, Take 2,000 Units by mouth every morning  prochlorperazine (COMPAZINE) 10 MG tablet, Take 1 tablet by mouth every 6 hours as needed for Nausea for 30 days. [unfilled]    ALLERGIES:  Pcn [penicillins]; Penicillins; Ppd [tuberculin purified protein derivative]; and Zofran [ondansetron hcl]    COMPLETE REVIEW OF SYSTEMS:  As noted in HPI, 12 point ROS reviewed and otherwise negative. Review of Systems   Constitutional: Positive for activity change, appetite change, fatigue and unexpected weight change. Negative for chills, diaphoresis and fever. HENT: Negative. Eyes: Negative. Respiratory: Negative. Cardiovascular: Negative. Gastrointestinal: Negative. Endocrine: Negative. Genitourinary: Negative. Musculoskeletal: Positive for arthralgias, back pain, gait problem, joint swelling, myalgias, neck pain and neck stiffness. Skin: Negative. Allergic/Immunologic: Negative. Neurological: Positive for tremors and weakness. Negative for dizziness, seizures, syncope, facial asymmetry, speech difficulty, light-headedness, numbness and headaches. Hematological: Negative for adenopathy. Does not bruise/bleed easily. Psychiatric/Behavioral: Positive for behavioral problems, decreased concentration, dysphoric mood and sleep disturbance. Negative for agitation, confusion, hallucinations, self-injury and suicidal ideas. The patient is nervous/anxious. The patient is not hyperactive.           OBJECTIVE  PHYSICAL EXAM:  /80   Pulse 103   Temp 98 °F (36.7 °C) (Oral)   Resp 18   SpO2 98%   There is no height or weight on file to calculate BMI. CONSTITUTIONAL:  Patient seems to be awake, seemed to be mildly depressed but he is smiling, sitting with his wife, is able to clinic appropriately with me. EYES:  Lids and lashes normal, pupils equal, round and reactive to light, extra ocular muscles intact, sclera clear, conjunctiva normal  ENT:  normocepalic, without obvious abnormality, atraumatic  NECK:  supple, symmetrical, trachea midline, skin normal and no stridor  BACK:  symmetric and no curvature  LUNGS:  no increased work of breathing  CARDIOVASCULAR:  regular rate and rhythm  ABDOMEN:  Soft nontender nondistended  MUSCULOSKELETAL:  Patient has left-sided hemiparesis, also he has generalized muscular tenderness along the whole left side, lower extremity, stiffness and rigidity noted in upper and lower extremities  NEUROLOGIC:  Left-sided hemiparesis, muscular rigidity and weakness involving lower extremity, patient is in wheelchair  SKIN:  no bruising or bleeding    DATA:   Diagnostic tests reviewed for today's visit:    All labs and imaging results reviewed. Lab Results   Component Value Date    WBC 6.9 05/31/2019    HGB 13.5 05/31/2019    HCT 40.7 05/31/2019    MCV 86.4 05/31/2019     05/31/2019     05/31/2019    K 5.0 05/31/2019     05/31/2019    CO2 27 05/31/2019    BUN 11 05/31/2019    CREATININE 0.57 05/31/2019    CALCIUM 9.5 05/31/2019    PHOS 2.5 01/14/2019    BNP 29 03/31/2013    ALKPHOS 95 05/31/2019    ALT <5 05/31/2019    AST 18 05/31/2019    BILITOT <0.2 05/31/2019    BILIDIR <0.2 12/17/2018    LABALBU 4.4 05/31/2019    LABALBU 3.4 02/23/2012       Xr Chest Standard (2 Vw)    Result Date: 6/1/2019  EXAMINATION: XR CHEST (2 VW)  CLINICAL HISTORY:  MEDICAL CLEARANCE FOR ECT COMPARISONS: January 11, 2019  FINDINGS: Two views of the chest are submitted. The cardiac silhouette is enlarged. Unchanged. .  The mediastinum is unremarkable. Pulmonary vascular attenuated. Right sided trachea. There are bibasilar areas of atelectasis, scarring. No focal infiltrates. No effusions. No Pneumothoraces. Old right seventh rib fracture. NO ACUTE ACTIVE CARDIOPULMONARY PROCESS        ASSESSMENT & PLAN  Active Hospital Problems    Diagnosis Date Noted    Chronic pain [G89.29] 06/03/2019    Generalized muscle ache [M79.10] 06/03/2019    Muscle spasticity [M62.838] 06/03/2019    Parkinson disease, symptomatic ([address] Gerardine Serve 06/03/2019    MDD (major depressive disorder), recurrent episode ([address] [F33.9] 05/31/2019    Severe episode of recurrent major depressive disorder, without psychotic features ([address] [F33.2] 05/31/2019       66years old years old male who has chronic muscle certain problem with Parkinson disease was seemed to be progressing also he does have central pain and neuropathic pain due to his left CVA causing/  Polo/ paresis. Patient on chronic opioid therapy with Embeda 20 mg every 12 hours and Flexeril which seemed not to be controlling his pain, his been expressing a lot of increasing generalized pain especially after ECT which mostly due to severe myalgia.     Patient also has chronic lower back pain problem which she seemed to be stable, he is to see me and received back injection in the past    RECOMMENDATION:  SEE ORDERS    I would discontinue Suboxone as a Suboxone is not indicated in his case,    Patient is on chronic opiate therapy with Embeda would be placing alternative MS Contin 15 mg every 8 hours to be equivalent to the Embeda dose which is morphine    Switch muscle relaxant to baclofen 5 mg every 6 hours and to be titrated to 10 mg needed    Maintain on other supportive care was Tylenol for breakthrough pain as well as lidocaine patches      As patient finishes ECT treatment he can go back to his normal routine of Embeda and he will be referred by Dr. Starlyn Gosselin to see me for chronic pain therapy optimization    SIGNATURE: Omayra Hillman MD PATIENT NAME: Ani Hughes   DATE: Lurdes 3, 2019 MRN: [id number]   TIME: 2:41 PM PAGER/CONTACT #: [phone]

## 2019-06-03 NOTE — PROGRESS NOTES
Pt. refused to attend the 1000 skills group, despite staff encouragement.  Electronically signed by Dallas Boateng on 6/3/2019 at 11:55 AM

## 2019-06-03 NOTE — PROGRESS NOTES
Pt out on unit and social with peers. Pt denies shower or ADL's today. Pt encouraged to participate in proper hygiene practices. Pt presents with clean and well kept appearance. Pt reports good appetite. Pt reports poor sleep d/t anxiety about upcoming ECT  . Pt rates anxiety 2/10. Pt rates depression 4/10. Pt reports attending groups. Pt denies SI, HI and A/V hallucinations. Pt alert and oriented x 4. Pt brightened and visiting with girlfriend. Pt states he feels \"brighter\" this afternoon. Pt expresses excitement about ECT treatment and being able to go home sometime this week and continue outpatient treatment. No s/s of distress noted. Will continue to monitor.      Electronically signed by Rolando Lucas LPN on 7/5/7507 at 7:68 PM

## 2019-06-03 NOTE — BH NOTE
Met with patient in person. ECT explained. [facility] Electroconvulsive therapy Handout given. ECT video viewed by patient. Pt verbalized understanding. Pt given this nurses telephone contact information. Cancellation policy explained. Pt's cesar Ortiz present.

## 2019-06-03 NOTE — GROUP NOTE
Group Therapy Note    Date: June 2    Group Start Time: 2100  Group End Time: 2130  Group Topic: Wrap-Up    [facility]        Group Therapy Note    Attendees: 11    Pt attended and participated in group.

## 2019-06-03 NOTE — PROGRESS NOTES
Patient visible on unit, social with peers. Pt has bright affect. Patient pleasant and cooperative. Patient rates his anxiety 4/10, depression 7/10. Pt reports poor sleep d/t leg cramps. Informed pt of new orders from pain management physician. Pt accepted PRN apomorphine injection in right arm per request. Pt states the injection helps him with stiffness. Per Dr. Ihsan Grover, it is ok for pt to have injection before ECT. Pt denies SI, HI, and Hallucinations. Pt's finace is with him and helped him shave. Razor given to this nurse and put in sharps container. Pt uses electric wheelchair. Pt has +2 edema in BLE-shelton hose applied this am.  Pt denies needs. Will continue to monitor.  Electronically signed by Cisco Graham RN on 6/3/19 at 10:35 AM

## 2019-06-03 NOTE — ANESTHESIA PRE PROCEDURE
(SINEMET)  MG per tablet 1 tablet  1 tablet Oral 5x Daily Berhane Chavez MD   1 tablet at 06/02/19 2341    Carbidopa-Levodopa ER 36. MG CPCR 1 capsule  1 capsule Oral TID Berhane Chavez MD        vitamin D (CHOLECALCIFEROL) tablet 2,000 Units  2,000 Units Oral QAM Berhane Chavez MD   2,000 Units at 06/02/19 0856    cyclobenzaprine (FLEXERIL) tablet 10 mg  10 mg Oral TID PRN Berhane Chavez MD   10 mg at 05/31/19 2046    dipyridamole-aspirin (AGGRENOX)  MG per extended release capsule 1 capsule  1 capsule Oral BID Berhane Chavez MD   1 capsule at 06/02/19 2150    finasteride (PROSCAR) tablet 5 mg  5 mg Oral Daily Berhane Chavez MD   5 mg at 06/02/19 0857    lithium capsule 150 mg  150 mg Oral BID Berhane Chavez MD   150 mg at 06/02/19 2151    mirtazapine (REMERON) tablet 30 mg  30 mg Oral Nightly Brehane Chavez MD   30 mg at 06/02/19 2151    pregabalin (LYRICA) capsule 100 mg  100 mg Oral BID Berhane Chavez MD   100 mg at 06/02/19 2149    prochlorperazine (COMPAZINE) tablet 10 mg  10 mg Oral Q6H PRN Berhane Chavez MD        rotigotine (NEUPRO) 2 MG/24HR 1 patch  1 patch Transdermal Daily Berhane Chavez MD   1 patch at 06/02/19 0857    sennosides-docusate sodium (SENOKOT-S) 8.6-50 MG tablet 2 tablet  2 tablet Oral Nightly Berhane Chavez MD   2 tablet at 06/02/19 2149    tamsulosin (FLOMAX) capsule 0.4 mg  0.4 mg Oral Daily Berhane Chavez MD   0.4 mg at 06/02/19 0857    nicotine polacrilex (NICORETTE) gum 4 mg  4 mg Oral PRN Berhane Chavez MD   4 mg at 05/31/19 2047       Allergies:     Allergies   Allergen Reactions    Pcn [Penicillins] Hives    Penicillins     Ppd [Tuberculin Purified Protein Derivative]     Zofran [Ondansetron Hcl]        Problem List:    Patient Active Problem List   Diagnosis Code    [facility] C41.9    Hepatitis C B19.20    Cerebral infarction ([facility] I63.9    MI (myocardial infarction) ([facility] I21.9    Left hemiplegia (HCC) G81.94    Hep C w/o coma, chronic (HCC) B18.2    Stroke (HCC) I63.9    MI (myocardial infarction) ([address] I21.9    Leukopenia D72.819    Hepatitis C B19.20    CAD (coronary artery disease) I25.10    Pneumonia J18.9    Kidney mass N28.89    Pancytopenia (HCC) D61.818    HCAP (healthcare-associated pneumonia) J18.9    Altered mental status R41.82    Urinary retention R33.9    MDD (major depressive disorder), recurrent episode (HCC) F33.9    Severe episode of recurrent major depressive disorder, without psychotic features ([address] F33.2       Past Medical History:        Diagnosis Date    CAD (coronary artery disease)     CVA (cerebral infarction) , 2008    x2    Hemiplegia as late effect of cerebrovascular accident ([address] 2012    This is heavily debated. MRI shows small vessel CVD and nothing to suggest significant prior stroke to leave hemiplegia. Neurology has suggested malingering. 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HCC)     Pneumonia     Stroke ([address]     X2       Past Surgical History:        Procedure Laterality Date    BONE RESECTION, RIB      BONY  TUMOR    BONY PELVIS SURGERY  1982    CORONARY ANGIOPLASTY WITH STENT PLACEMENT      X2    CORONARY ANGIOPLASTY WITH STENT PLACEMENT      x2    KIDNEY CYST REMOVAL      benign    KIDNEY SURGERY      TUMOR AND STONE REMOVED       Social History:    Social History     Tobacco Use    Smoking status: Former Smoker     Last attempt to quit: 2006     Years since quittin.2    Smokeless tobacco: Never Used   Substance Use Topics    Alcohol use:  Yes     Alcohol/week: 0.6 oz     Types: 1 Cans of beer per week     Comment: occasional                                Counseling given: Not Answered      Vital Signs (Current):   Vitals: 06/03/19 0757 06/03/19 1100 06/03/19 1115 06/03/19 1130   BP: (!) 158/89 (!) 142/83 136/84 135/82   Pulse: 87 92 87 87   Resp: 18 20 9 10   Temp:  99.2 °F (37.3 °C)     TempSrc:       SpO2: 97% 98% 97% 98%                                              BP Readings from Last 3 Encounters:   06/03/19 135/82   02/13/19 132/80   01/14/19 129/61       NPO Status: Time of last liquid consumption: 0400                        Time of last solid consumption: 0400                        Date of last liquid consumption: 06/03/19                        Date of last solid food consumption: 06/03/19    BMI:   Wt Readings from Last 3 Encounters:   02/13/19 220 lb (99.8 kg)   01/11/19 200 lb (90.7 kg)   01/09/19 200 lb (90.7 kg)     There is no height or weight on file to calculate BMI.    CBC:   Lab Results   Component Value Date    WBC 6.9 05/31/2019    RBC 4.71 05/31/2019    RBC 4.25 02/26/2012    HGB 13.5 05/31/2019    HCT 40.7 05/31/2019    MCV 86.4 05/31/2019    RDW 14.7 05/31/2019     05/31/2019       CMP:   Lab Results   Component Value Date     05/31/2019    K 5.0 05/31/2019     05/31/2019    CO2 27 05/31/2019    BUN 11 05/31/2019    CREATININE 0.57 05/31/2019    GFRAA >60.0 05/31/2019    LABGLOM >60.0 05/31/2019    GLUCOSE 112 05/31/2019    GLUCOSE 92 02/26/2012    PROT 8.1 05/31/2019    CALCIUM 9.5 05/31/2019    BILITOT <0.2 05/31/2019    ALKPHOS 95 05/31/2019    AST 18 05/31/2019    ALT <5 05/31/2019       POC Tests: No results for input(s): POCGLU, POCNA, POCK, POCCL, POCBUN, POCHEMO, POCHCT in the last 72 hours.     Coags:   Lab Results   Component Value Date    PROTIME 10.2 01/11/2019    PROTIME 10.8 02/20/2012    INR 1.0 01/11/2019    APTT 25.4 03/31/2013       HCG (If Applicable): No results found for: PREGTESTUR, PREGSERUM, HCG, HCGQUANT     ABGs: No results found for: PHART, PO2ART, MIF8DDW, VCX9NJU, BEART, J1IZWUMF     Type & Screen (If Applicable):  No results found for: LABABO, [address]    Anesthesia Evaluation    Airway: Mallampati: II  TM distance: >3 FB   Neck ROM: full  Mouth opening: > = 3 FB Dental:          Pulmonary:normal exam    (+) pneumonia:  shortness of breath:                             Cardiovascular:    (+) past MI: > 6 months, CAD:, CABG/stent: no interval change,                   Neuro/Psych:   (+) CVA: residual symptoms, psychiatric history:depression/anxiety             GI/Hepatic/Renal:   (+) hepatitis: C, liver disease: hepatic encephalopathy,          ROS comment: Took \"cure\" for HepC. Endo/Other: Negative Endo/Other ROS                    Abdominal:           Vascular:                                        Anesthesia Plan      general     ASA 3       Induction: intravenous. Anesthetic plan and risks discussed with patient. Use of blood products discussed with patient whom.                    Michelle Abarca MD   6/3/2019

## 2019-06-03 NOTE — OP NOTE
Department of Psychiatry  Electroconvulsive Therapy Treatment Note        6/3/2019    PURPOSE FOR ECT:  Therapeutic NUMBER: 1    DIAGNOSIS:   Active Problems:    MDD (major depressive disorder), recurrent episode (HCC)    Severe episode of recurrent major depressive disorder, without psychotic features ([facility]  Resolved Problems:    * No resolved hospital problems.  *      MEDICATIONS:    Current Facility-Administered Medications:     0.9 % sodium chloride bolus, 500 mL, Intravenous, Once, Leonce Moritz, MD    morphine (MS CONTIN) extended release tablet 15 mg, 15 mg, Oral, 2 times per day, Lux Lugo MD    lidocaine 4 % external patch 1 patch, 1 patch, Transdermal, Daily, Lux Lugo MD    pravastatin (PRAVACHOL) tablet 20 mg, 20 mg, Oral, Nightly, Viviana Rea, APRN - CNP, 20 mg at 06/02/19 2150    acetaminophen (TYLENOL) tablet 650 mg, 650 mg, Oral, 4x Daily, Leonce Moritz, MD, 650 mg at 06/02/19 2150    Apomorphine HCl SOCT 0.4 mL, 0.4 mL, Subcutaneous, Q2H PRN, Leonce Moritz, MD, 0.4 mL at 06/03/19 1030    carbidopa-levodopa (SINEMET)  MG per tablet 1 tablet, 1 tablet, Oral, 5x Daily, Leonce Moritz, MD, 1 tablet at 06/02/19 2341    Carbidopa-Levodopa ER 36. MG CPCR 1 capsule, 1 capsule, Oral, TID, Leonce Moritz, MD    vitamin D (CHOLECALCIFEROL) tablet 2,000 Units, 2,000 Units, Oral, QAM, Leonce Moritz, MD, 2,000 Units at 06/02/19 0856    cyclobenzaprine (FLEXERIL) tablet 10 mg, 10 mg, Oral, TID PRN, Leonce Moritz, MD, 10 mg at 05/31/19 2046    dipyridamole-aspirin (AGGRENOX)  MG per extended release capsule 1 capsule, 1 capsule, Oral, BID, Leonce Moritz, MD, 1 capsule at 06/02/19 2150    finasteride (PROSCAR) tablet 5 mg, 5 mg, Oral, Daily, Leonce Moritz, MD, 5 mg at 06/02/19 0857    lithium capsule 150 mg, 150 mg, Oral, BID, Leonce Moritz, MD, 150 mg at 06/02/19 2151    mirtazapine (REMERON) tablet 30 mg, 30 mg, Oral, Nightly, Jemma Campos Keith Ruiz MD, 30 mg at 06/02/19 2151    pregabalin (LYRICA) capsule 100 mg, 100 mg, Oral, BID, Ab Guerrero MD, 100 mg at 06/02/19 2149    prochlorperazine (COMPAZINE) tablet 10 mg, 10 mg, Oral, Q6H PRN, Ab Guerrero MD    rotigotine (NEUPRO) 2 MG/24HR 1 patch, 1 patch, Transdermal, Daily, Ab Guerrero MD, 1 patch at 06/02/19 0857    sennosides-docusate sodium (SENOKOT-S) 8.6-50 MG tablet 2 tablet, 2 tablet, Oral, Nightly, Ab Guerrero MD, 2 tablet at 06/02/19 2149    tamsulosin (FLOMAX) capsule 0.4 mg, 0.4 mg, Oral, Daily, Ab Guerrero MD, 0.4 mg at 06/02/19 0857    nicotine polacrilex (NICORETTE) gum 4 mg, 4 mg, Oral, PRN, Ab Guerrero MD, 4 mg at 05/31/19 2047    CHANGE IN PSYCHIATRY STATUS SINCE LAST ECT:   N/A    INFORMED CONSENT OBTAINED : YES    PRE ECT MEDICATION ADMINISTERED:   YES    NPO STATUS: Confirmed    ELECTRODE PLACEMENT : RUL    TIME OUT :  TEAM CONFIRMS THE CORRECT PATIENT, CORRECT PROCEDURE AND CORRECT SITE.     DEVICE PARAMETERS:   Charge: 31  PW- 0.3  Freq- 20  Duration- 3.25  EEG- 45  Motor- 25        VITALS:   Vitals:    06/03/19 1130   BP: 135/82   Pulse: 87   Resp: 10   Temp:    SpO2: 02%         COMPLICATIONS: no      RECOMMENDATIONS FOR NEXT TREATMENT:  =wednesday        PSYCHIATRIST:  Chaya Quiroz

## 2019-06-03 NOTE — GROUP NOTE
Group Therapy Note    Date: June 2    Group Start Time: 1930  Group End Time: 2000  Group Topic: Recreational    [facility]        Group Therapy Note    Attendees: 6    Pt did not attend group.

## 2019-06-03 NOTE — PROGRESS NOTES
Notified Zeinab SWANSON that pt's potassium level is 5.0. New order received.  Electronically signed by Jodi Orourke RN on 6/3/19 at 12:00 PM

## 2019-06-03 NOTE — PROGRESS NOTES
Physical Therapy Missed Treatment   Facility/Department: [facility] MED SURG Q191/H133-99    NAME: Racquel Granados    : 1955 (66 y.o.)  MRN: [id number]    Account: [de-identified]  Gender: male    Chart reviewed, attempted PT at 14:00. Patient unavailable 2° to:    [x] Hold - Pt had ECT today. [] Pt declined      [] Pt. . off floor for test/procedure. [] Pt. Unavailable        Will attempt PT treatment again at earliest convenience.       Electronically signed by Crescencio Hernandez PTA on 6/3/19 at 2:17 PM

## 2019-06-04 LAB — POTASSIUM SERPL-SCNC: 4.1 MEQ/L (ref 3.4–4.9)

## 2019-06-04 PROCEDURE — 97112 NEUROMUSCULAR REEDUCATION: CPT

## 2019-06-04 PROCEDURE — 97535 SELF CARE MNGMENT TRAINING: CPT

## 2019-06-04 PROCEDURE — 2580000003 HC RX 258: Performed by: PSYCHIATRY & NEUROLOGY

## 2019-06-04 PROCEDURE — 36415 COLL VENOUS BLD VENIPUNCTURE: CPT

## 2019-06-04 PROCEDURE — 84132 ASSAY OF SERUM POTASSIUM: CPT

## 2019-06-04 PROCEDURE — 99232 SBSQ HOSP IP/OBS MODERATE 35: CPT | Performed by: PSYCHIATRY & NEUROLOGY

## 2019-06-04 PROCEDURE — 6370000000 HC RX 637 (ALT 250 FOR IP): Performed by: ANESTHESIOLOGY

## 2019-06-04 PROCEDURE — 97116 GAIT TRAINING THERAPY: CPT

## 2019-06-04 PROCEDURE — 1240000000 HC EMOTIONAL WELLNESS R&B

## 2019-06-04 PROCEDURE — 6370000000 HC RX 637 (ALT 250 FOR IP): Performed by: PSYCHIATRY & NEUROLOGY

## 2019-06-04 RX ORDER — TRAZODONE HYDROCHLORIDE 50 MG/1
50 TABLET ORAL NIGHTLY
Status: DISCONTINUED | OUTPATIENT
Start: 2019-06-04 | End: 2019-06-06 | Stop reason: HOSPADM

## 2019-06-04 RX ORDER — SODIUM CHLORIDE 0.9 % (FLUSH) 0.9 %
10 SYRINGE (ML) INJECTION EVERY 8 HOURS
Status: DISCONTINUED | OUTPATIENT
Start: 2019-06-04 | End: 2019-06-04

## 2019-06-04 RX ORDER — SODIUM CHLORIDE 0.9 % (FLUSH) 0.9 %
10 SYRINGE (ML) INJECTION PRN
Status: DISCONTINUED | OUTPATIENT
Start: 2019-06-04 | End: 2019-06-06 | Stop reason: HOSPADM

## 2019-06-04 RX ADMIN — BACLOFEN 5 MG: 10 TABLET ORAL at 10:19

## 2019-06-04 RX ADMIN — BACLOFEN 5 MG: 10 TABLET ORAL at 21:21

## 2019-06-04 RX ADMIN — APOMORPHINE HYDROCHLORIDE 0.4 ML: 30 INJECTION SUBCUTANEOUS at 15:32

## 2019-06-04 RX ADMIN — MORPHINE SULFATE 15 MG: 15 TABLET, FILM COATED, EXTENDED RELEASE ORAL at 06:19

## 2019-06-04 RX ADMIN — ROTIGOTINE 1 PATCH: 2 PATCH, EXTENDED RELEASE TRANSDERMAL at 10:18

## 2019-06-04 RX ADMIN — TRAZODONE HYDROCHLORIDE 50 MG: 50 TABLET ORAL at 21:22

## 2019-06-04 RX ADMIN — MORPHINE SULFATE 15 MG: 15 TABLET, FILM COATED, EXTENDED RELEASE ORAL at 21:21

## 2019-06-04 RX ADMIN — PRAVASTATIN SODIUM 20 MG: 10 TABLET ORAL at 21:20

## 2019-06-04 RX ADMIN — PREGABALIN 100 MG: 100 CAPSULE ORAL at 21:21

## 2019-06-04 RX ADMIN — MORPHINE SULFATE 15 MG: 15 TABLET, FILM COATED, EXTENDED RELEASE ORAL at 14:35

## 2019-06-04 RX ADMIN — CARBIDOPA AND LEVODOPA 1 TABLET: 25; 100 TABLET ORAL at 11:01

## 2019-06-04 RX ADMIN — LITHIUM CARBONATE 150 MG: 150 CAPSULE, GELATIN COATED ORAL at 21:21

## 2019-06-04 RX ADMIN — CARBIDOPA AND LEVODOPA 1 TABLET: 25; 100 TABLET ORAL at 00:44

## 2019-06-04 RX ADMIN — Medication 10 ML: at 06:07

## 2019-06-04 RX ADMIN — VITAMIN D, TAB 1000IU (100/BT) 2000 UNITS: 25 TAB at 10:19

## 2019-06-04 RX ADMIN — CARBIDOPA AND LEVODOPA 1 TABLET: 25; 100 TABLET ORAL at 06:19

## 2019-06-04 RX ADMIN — APOMORPHINE HYDROCHLORIDE 0.4 ML: 30 INJECTION SUBCUTANEOUS at 19:57

## 2019-06-04 RX ADMIN — LITHIUM CARBONATE 150 MG: 150 CAPSULE, GELATIN COATED ORAL at 10:18

## 2019-06-04 RX ADMIN — ASPIRIN AND DIPYRIDAMOLE 1 CAPSULE: 25; 200 CAPSULE, EXTENDED RELEASE ORAL at 10:19

## 2019-06-04 RX ADMIN — NICOTINE POLACRILEX 4 MG: 4 GUM, CHEWING BUCCAL at 10:19

## 2019-06-04 RX ADMIN — FINASTERIDE 5 MG: 5 TABLET, FILM COATED ORAL at 10:19

## 2019-06-04 RX ADMIN — CARBIDOPA AND LEVODOPA 1 TABLET: 25; 100 TABLET ORAL at 19:55

## 2019-06-04 RX ADMIN — TAMSULOSIN HYDROCHLORIDE 0.4 MG: 0.4 CAPSULE ORAL at 10:19

## 2019-06-04 RX ADMIN — MIRTAZAPINE 30 MG: 30 TABLET, FILM COATED ORAL at 21:20

## 2019-06-04 RX ADMIN — BACLOFEN 5 MG: 10 TABLET ORAL at 01:13

## 2019-06-04 RX ADMIN — APOMORPHINE HYDROCHLORIDE 0.4 ML: 30 INJECTION SUBCUTANEOUS at 05:52

## 2019-06-04 RX ADMIN — PREGABALIN 100 MG: 100 CAPSULE ORAL at 10:18

## 2019-06-04 RX ADMIN — SENNOSIDES AND DOCUSATE SODIUM 2 TABLET: 8.6; 5 TABLET ORAL at 21:20

## 2019-06-04 RX ADMIN — CARBIDOPA AND LEVODOPA 1 TABLET: 25; 100 TABLET ORAL at 14:34

## 2019-06-04 RX ADMIN — ASPIRIN AND DIPYRIDAMOLE 1 CAPSULE: 25; 200 CAPSULE, EXTENDED RELEASE ORAL at 21:20

## 2019-06-04 ASSESSMENT — ENCOUNTER SYMPTOMS
TROUBLE SWALLOWING: 0
COUGH: 0
WHEEZING: 0
COLOR CHANGE: 0
CHEST TIGHTNESS: 0
NAUSEA: 0
SHORTNESS OF BREATH: 0
VOMITING: 0

## 2019-06-04 ASSESSMENT — PAIN SCALES - GENERAL
PAINLEVEL_OUTOF10: 7
PAINLEVEL_OUTOF10: 5
PAINLEVEL_OUTOF10: 8
PAINLEVEL_OUTOF10: 0
PAINLEVEL_OUTOF10: 9

## 2019-06-04 NOTE — PROGRESS NOTES
Neurology Daily Progress Note  Name: Ani Hughes  Age: 66 y.o. Gender: male  CodeStatus: Prior  Allergies: Pcn [Penicillins]  Penicillins  Ppd [Tuberculin Purified Protein Derivative]  Zofran [Ondansetron Hcl]    Chief Complaint:No chief complaint on file. Primary Care Provider: Russ Keenan MD  InpatientTreatment Team: Treatment Team: Attending Provider: Sasha Ramos MD; Consulting Physician: Adriel Galvan MD; Consulting Physician: Peña Andrade MD; Consulting Physician: Omayra Hillman MD; Tech: Ebook Glueolee Reach; Surgeon: Sasha Ramos MD; Patient Care Tech: GeorgeStumbleUponers; Registered Nurse: Quyen Bliss, RN; Registered Nurse: Annie Castillo, RN; Registered Nurse: Suzanne Melgoza, RN; Registered Nurse: Inocente Gallego RN  Admission Date: 5/31/2019      HPI   Pt seen and examined for neuro follow up for Hx of CVA with left hemiparesis, parkinson;s, and MDD requiring ECT. A&O x3, NAD, pleasant and cooperative. Pt reports ECT x1 thus far. He states he was previously on Nuedexta for PBA and it worked well for him. he states a physician at the [facility] stopped this medication and he doesn't know why. He reports periods of crying at inappropriate times such as during a baseball game. Vitals:    06/04/19 0907   BP: 105/68   Pulse: 81   Resp: 18   Temp:    SpO2: 97%        Physical Exam      Review of Systems   Constitutional: Negative for appetite change, chills, fatigue and fever. HENT: Negative for hearing loss and trouble swallowing. Eyes: Negative for visual disturbance. Respiratory: Negative for cough, chest tightness, shortness of breath and wheezing. Cardiovascular: Negative for chest pain, palpitations and leg swelling. Gastrointestinal: Negative for nausea and vomiting. Musculoskeletal: Positive for gait problem. Skin: Negative for color change and rash. Neurological: Positive for speech difficulty (stuttering speech) and weakness (left sided).  Negative for dizziness, seizures, syncope, facial asymmetry, light-headedness, numbness and headaches. Psychiatric/Behavioral: Positive for dysphoric mood (tearful with exam). Negative for agitation, confusion and hallucinations. The patient is not nervous/anxious. Medications:  Reviewed    Infusion Medications:   Scheduled Medications:    sodium chloride flush  10 mL Intravenous Q8H    lidocaine  3 patch Transdermal Daily    morphine  15 mg Oral 3 times per day    pravastatin  20 mg Oral Nightly    carbidopa-levodopa  1 tablet Oral 5x Daily    Carbidopa-Levodopa ER  1 capsule Oral TID    vitamin D  2,000 Units Oral QAM    dipyridamole-aspirin  1 capsule Oral BID    finasteride  5 mg Oral Daily    lithium  150 mg Oral BID    mirtazapine  30 mg Oral Nightly    pregabalin  100 mg Oral BID    rotigotine  1 patch Transdermal Daily    sennosides-docusate sodium  2 tablet Oral Nightly    tamsulosin  0.4 mg Oral Daily     PRN Meds: sodium chloride flush, baclofen, acetaminophen, SUMAtriptan, Apomorphine HCl, prochlorperazine, nicotine polacrilex    Labs:   No results for input(s): WBC, HGB, HCT, PLT in the last 72 hours. Recent Labs     06/04/19  1054   K 4.1     No results for input(s): AST, ALT, BILIDIR, BILITOT, ALKPHOS in the last 72 hours. No results for input(s): INR in the last 72 hours. No results for input(s): Ellender Chaney in the last 72 hours. Urinalysis:   Lab Results   Component Value Date    NITRU Negative 06/01/2019    WBCUA 20-50 01/11/2019    BACTERIA FEW 01/11/2019    RBCUA 0-2 01/11/2019    BLOODU Negative 06/01/2019    SPECGRAV 1.027 06/01/2019    GLUCOSEU Negative 06/01/2019       Radiology:   Most recent    Chest CT      WITH CONTRAST:No results found for this or any previous visit. WITHOUT CONTRAST: No results found for this or any previous visit.     CXR      2-view:   Results for orders placed during the hospital encounter of 05/31/19   XR CHEST STANDARD (2 VW)    Narrative physicians: Dr Chad Rajput, Dr DEANA Meadows, Dr Justa Fuller    Electronically signed by PAMELA Dobbs CNP on 6/4/2019 at 1:01 PM

## 2019-06-04 NOTE — PROGRESS NOTES
Pt. attended the 0900 community meeting. Electronically signed by Shell Benitez, [address] on 6/4/2019 at 10:19 AM

## 2019-06-04 NOTE — GROUP NOTE
Group Therapy Note    Date: June 4    Group Start Time: 1435  Group End Time: 1505  Group Topic: Cognitive Skills    MLOZ 3W I    DAVE Reddy        Group Therapy Note             Patient's Goal:  To participate in mood management group. Notes:  Patient learned about emotional/wellness mind. Status After Intervention:  Improved    Participation Level: Active Listener    Participation Quality: Appropriate      Speech:  normal      Thought Process/Content: Logical      Affective Functioning: Congruent      Mood: depressed      Level of consciousness:  Alert      Response to Learning: Able to verbalize current knowledge/experience      Endings: None Reported    Modes of Intervention: Education      Discipline Responsible: /Counselor      Signature:   DAVE Reddy

## 2019-06-04 NOTE — PROGRESS NOTES
sodium chloride flush 0.9 % injection 10 mL, 10 mL, Intravenous, PRN, José Miguel Cheek MD    lidocaine 4 % external patch 3 patch, 3 patch, Transdermal, Daily, Betito Villegas MD    morphine (MS CONTIN) extended release tablet 15 mg, 15 mg, Oral, 3 times per day, Betito Villegas MD, 15 mg at 06/04/19 2261    baclofen (LIORESAL) tablet 5 mg, 5 mg, Oral, Q6H PRN, Betito Villegas MD, 5 mg at 06/04/19 1019    acetaminophen (TYLENOL) tablet 650 mg, 650 mg, Oral, Q4H PRN, Betito Villegas MD    SUMAtriptan (IMITREX) tablet 50 mg, 50 mg, Oral, Daily PRN, José Miguel Cheek MD    pravastatin (PRAVACHOL) tablet 20 mg, 20 mg, Oral, Nightly, José Miguel Cheek MD, 20 mg at 06/03/19 2037    Apomorphine HCl SOCT 0.4 mL, 0.4 mL, Subcutaneous, Q2H PRN, José Miguel Cheek MD, 0.4 mL at 06/04/19 0552    carbidopa-levodopa (SINEMET)  MG per tablet 1 tablet, 1 tablet, Oral, 5x Daily, José Miguel Cheek MD, 1 tablet at 06/04/19 0619    Carbidopa-Levodopa ER 36. MG CPCR 1 capsule, 1 capsule, Oral, TID, José Miguel Cheek MD    vitamin D (CHOLECALCIFEROL) tablet 2,000 Units, 2,000 Units, Oral, QAM, José Miguel Cheek MD, 2,000 Units at 06/04/19 1019    dipyridamole-aspirin (AGGRENOX)  MG per extended release capsule 1 capsule, 1 capsule, Oral, BID, José Miguel Cheek MD, 1 capsule at 06/04/19 1019    finasteride (PROSCAR) tablet 5 mg, 5 mg, Oral, Daily, José Miguel Cheek MD, 5 mg at 06/04/19 1019    lithium capsule 150 mg, 150 mg, Oral, BID, José Miguel Cheek MD, 150 mg at 06/04/19 1018    mirtazapine (REMERON) tablet 30 mg, 30 mg, Oral, Nightly, José Miguel Cheek MD, 30 mg at 06/03/19 2037    pregabalin (LYRICA) capsule 100 mg, 100 mg, Oral, BID, José Miguel Cheek MD, 100 mg at 06/04/19 1018    prochlorperazine (COMPAZINE) tablet 10 mg, 10 mg, Oral, Q6H PRN, José Miguel Cheek MD    rotigotine (NEUPRO) 2 MG/24HR 1 patch, 1 patch, Transdermal, Daily, José Miguel Cheek MD, 1 patch at 06/04/19 1018   sennosides-docusate sodium (SENOKOT-S) 8.6-50 MG tablet 2 tablet, 2 tablet, Oral, Nightly, Weston Billingsley MD, 2 tablet at 06/03/19 2036    tamsulosin (FLOMAX) capsule 0.4 mg, 0.4 mg, Oral, Daily, Weston Billingsley MD, 0.4 mg at 06/04/19 1019    nicotine polacrilex (NICORETTE) gum 4 mg, 4 mg, Oral, PRN, Weston Billingsley MD, 4 mg at 06/04/19 1019      Examination:  /68   Pulse 81   Temp 98 °F (36.7 °C) (Oral)   Resp 18   SpO2 97%   Gait - steady  Medication side effects(SE): no    Mental Status Examination:    Level of consciousness:  within normal limits   Appearance:  poor grooming and fair hygiene  Behavior/Motor:  psychomotor retardation  Attitude toward examiner:  cooperative  Speech:  slow   Mood: depressed  Affect:  blunted  Thought processes:  slow   Thought content:  Suicidal Ideation:  passive  Cognition:  oriented to person, place, and time   Concentration poor  Insight poor   Judgement poor     ASSESSMENT:   Patient symptoms are:  [] Well controlled  [] Improving  [] Worsening  [] No change      Diagnosis:   Active Problems:    MDD (major depressive disorder), recurrent episode (HCC)    Severe episode of recurrent major depressive disorder, without psychotic features (HCC)    Chronic pain    Generalized muscle ache    Muscle spasticity    Parkinson disease, symptomatic ([address]  Resolved Problems:    Primary Parkinsonism ([facility]      LABS:    No results for input(s): WBC, HGB, PLT in the last 72 hours. No results for input(s): NA, K, CL, CO2, BUN, CREATININE, GLUCOSE in the last 72 hours. No results for input(s): BILITOT, ALKPHOS, AST, ALT in the last 72 hours.   Lab Results   Component Value Date    LABAMPH Neg 06/01/2019    BARBSCNU Neg 06/01/2019    LABBENZ POSITIVE 06/01/2019    OPIATESCREENURINE POSITIVE 06/01/2019    PHENCYCLIDINESCREENURINE Neg 06/01/2019    ETOH 233 03/31/2013     Lab Results   Component Value Date    TSH 2.680 05/31/2019     Lab Results   Component Value Date LITHIUM 0.4 (L) 03/18/2019     No results found for: VALPROATE, CBMZ    RISK ASSESSMENT:     Treatment Plan:  Reviewed current Medications with the patient. Risks, benefits, side effects, drug-to-drug interactions and alternatives to treatment were discussed. Collateral information:   CD evaluation  Encourage patient to attend group and other milieu activities.   Discharge planning discussed with the patient and treatment team.    PSYCHOTHERAPY/COUNSELING:  [x] Therapeutic interview  [x] Supportive  [] CBT  [] Ongoing  [] Other    [x] Patient continues to need, on a daily basis, active treatment furnished directly by or requiring the supervision of inpatient psychiatric personnel      Anticipated Length of stay            Electronically signed by Chaya Quiroz MD on 6/4/2019 at 10:54 AM

## 2019-06-04 NOTE — FLOWSHEET NOTE
Morning assessment completed at this time. Able to make needs known. States that he is having anxiety 6/10 and depression 4/10. Patient appears calm throughout the assessment. Explaining the new medication that he takes for parkinson's. His facial expression is brightened with smiling. States that he get anxious around a lot of people. Explains the loss of his wife and the effect that had on his life. Expresses love for girlfriend and is up beat with conversation. Denies and SI, HI, and AVH. Does state that \"I,  at times may got through bad times and I wants to stop taking my Parkinson's meds and just let it take over, but then I don't\". Utilizes motorized wheelchair for mobility throughout the unit. Girlfriend is visiting this day.

## 2019-06-04 NOTE — PROGRESS NOTES
Visible on unit, social with visitor and select peers. Pt eating and drinking fluids w/o difficulty. Voices no concerns. Compliant with medications.

## 2019-06-04 NOTE — DISCHARGE INSTR - COC
Continuity of Care Form    Patient Name: Genaro Gonzales   :  1955  MRN:  [id number]    Admit date:  2019  Discharge date:  ***    Code Status Order: Prior   Advance Directives:   [facility] Documentation     Date/Time Healthcare Directive Type of Healthcare Directive Copy in [address] Agent's Name Healthcare Agent's Phone Number    19 1121  No, patient does not have an advance directive for healthcare treatment -- -- -- -- --    19  No, patient does not have an advance directive for healthcare treatment -- -- -- -- --          Admitting Physician:  Jackie Howell MD  PCP: Pedrito Carter MD    Discharging Nurse: ana Cruz 4 Unit/Room#: [address] [place],[address] Unit Phone Number: [phone]      Emergency Contact:   Extended Emergency Contact Information  Primary Emergency Contact: [address] [address] Phone: [phone]  Mobile Phone: [phone]  Relation: Domestic Partner  Secondary Emergency Contact: Araseli Hills [address] Phone: [phone]  Relation: Brother/Sister    Past Surgical History:  Past Surgical History:   Procedure Laterality Date    BONE RESECTION, RIB      BONY  TUMOR    BONY PELVIS SURGERY  1982    CORONARY ANGIOPLASTY WITH STENT PLACEMENT      X2    CORONARY ANGIOPLASTY WITH STENT PLACEMENT      x2    KIDNEY CYST REMOVAL      benign    KIDNEY SURGERY      TUMOR AND STONE REMOVED       Immunization History:   Immunization History   Administered Date(s) Administered    Tdap (Boostrix, Adacel) 2019       Active Problems:  Patient Active Problem List   Diagnosis Code    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    Hepatitis C B19.20    Cerebral infarction ([facility] I63.9    MI (myocardial infarction) ([address] I21.9    Left hemiplegia ([facility] G81.94    Hep C w/o coma, chronic (HCC) B18.2    Stroke ([facility] I63.9    MI (myocardial infarction) ([facility] I21.9    Leukopenia D72.819    Hepatitis C B19.20    CAD (coronary artery disease) I25.10    Pneumonia J18.9    Kidney mass N28.89    Pancytopenia (HCC) D61.818    HCAP (healthcare-associated pneumonia) J18.9    Altered mental status R41.82    Urinary retention R33.9    MDD (major depressive disorder), recurrent episode (HCC) F33.9    Severe episode of recurrent major depressive disorder, without psychotic features (HCC) F33.2    Chronic pain G89.29    Generalized muscle ache M79.10    Muscle spasticity M62.838    Parkinson disease, symptomatic (HCC) G20       Isolation/Infection:   Isolation          No Isolation            Nurse Assessment:  Last Vital Signs: /68   Pulse 81   Temp 98 °F (36.7 °C) (Oral)   Resp 18   SpO2 97%     Last documented pain score (0-10 scale): Pain Level: 8  Last Weight:   Wt Readings from Last 1 Encounters:   02/13/19 220 lb (99.8 kg)     Mental Status:  oriented    IV Access:  - None    Nursing Mobility/ADLs:  Walking   Dependent  Transfer  Assisted  Bathing  Assisted  Dressing  Assisted  Toileting  Assisted  Feeding  Independent  Med Admin  Dependent  Med Delivery   whole    Wound Care Documentation and Therapy:        Elimination:  Continence:   · Bowel: Yes  · Bladder: Yes  Urinary Catheter: None   Colostomy/Ileostomy/Ileal Conduit: No       Date of Last BM: 6/2/19  No intake or output data in the 24 hours ending 06/04/19 1150  No intake/output data recorded. Safety Concerns:     uses wheeelchair needs assistance to transfer     Impairments/Disabilities:      parkinson's    Nutrition Therapy:  Current Nutrition Therapy:   - Oral Diet:  General    Routes of Feeding: Oral  Liquids: No Restrictions  Daily Fluid Restriction: no  Last Modified Barium Swallow with Video (Video Swallowing Test): not done    Treatments at the Time of Hospital Discharge:   Respiratory Treatments: n/a  Oxygen Therapy:  is not on home oxygen therapy.   Ventilator:    - No ventilator support    Rehab

## 2019-06-04 NOTE — PROGRESS NOTES
Pt out on unit and social with peers. Pt reports showering today. Pt presents with clean and well kept appearance. Pt reports good appetite. Pt reports poor sleep. Pt rates anxiety 3/10. Pt rates depression 4/10. Pt reports attending groups. Pt denies SI, HI and A/V hallucinations. Pt alert and oriented x 4. Pt calm and cooperative. ?  No s/s of distress noted. Will continue to monitor.      Electronically signed by Catalina Miller LPN on 9/6/7571 at 6:48 PM

## 2019-06-04 NOTE — PROGRESS NOTES
Physical Therapy Med Surg Daily Treatment Note  Fac[facility]/Department[facility]  [facility]       NAME: Markell Asencio  : 1955 (75 y.o.)  MRN: [id number]  CODE STATUS: Prior    Date of Service: 2019    Patient Diagnosis(es): MDD (major depressive disorder), recurrent episode ([address] [F33.9]  Severe episode of recurrent major depressive disorder, without psychotic features ([address] [F33.2]   No chief complaint on file. Patient Active Problem List    Diagnosis Date Noted    Chronic pain 2019    Generalized muscle ache 2019    Muscle spasticity 2019    Parkinson disease, symptomatic ([address] 2019    MDD (major depressive disorder), recurrent episode ([address] 2019    Severe episode of recurrent major depressive disorder, without psychotic features ([address] 2019    Altered mental status     Urinary retention     HCAP (healthcare-associated pneumonia) 2019    Pancytopenia ([address] 2013    Hep C w/o coma, chronic ([address] 2012    Left hemiplegia ([address] 2012    Osteosarcoma (HCC)     Hepatitis C     Cerebral infarction ([address]     MI (myocardial infarction) ([address]     Stroke ([address]     MI (myocardial infarction) ([address]     Leukopenia     Hepatitis C     CAD (coronary artery disease)     Pneumonia     Kidney mass         Past Medical History:   Diagnosis Date    CAD (coronary artery disease)     CVA (cerebral infarction) , 2008    x2    Hemiplegia as late effect of cerebrovascular accident ([address] 2012    This is heavily debated. MRI shows small vessel CVD and nothing to suggest significant prior stroke to leave hemiplegia. Neurology has suggested malingering.     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[address]     Pneumonia     Stroke ([address]     X2     Past Surgical History:   Procedure Laterality Date    BONE RESECTION, RIB      BONY  TUMOR    BONY PELVIS SURGERY  1982    CORONARY ANGIOPLASTY WITH STENT PLACEMENT      X2    CORONARY ANGIOPLASTY WITH STENT PLACEMENT      x2    KIDNEY CYST REMOVAL      benign    KIDNEY SURGERY      TUMOR AND STONE REMOVED          Restrictions:  Restrictions/Precautions: Fall Risk    SUBJECTIVE:  Subjective  Subjective: I'm going to amaze you. When I have that shot I can do anything. General Comment  Comments: Shot given prior to tx. Pre Pain Assessment:  Pre Treatment Pain Screening  Pain at present: 0  Intervention List: Patient able to continue with treatment          Post Pain Assessment:   Pain Assessment  Pain Level: 0       OBJECTIVE:         Bed mobility  Rolling to Right: Stand by assistance  Supine to Sit: Stand by assistance  Sit to Supine: Stand by assistance  Comment: Pt lifts own leg into bed. bed flat with heavy use of hand rails. Pt states without the shot he is unable to perform bed mobility without help. Transfers  Sit to Stand: Stand by assistance;Contact guard assistance  Stand to sit: Contact guard assistance;Stand by assistance  Bed to Chair: Stand by assistance;Contact guard assistance  Comment: vc's for technique and hand placement. Increased time and effort to complete. Pt states without the shot he would not be able to stand without heavy assist     Ambulation  Ambulation?: Yes  Ambulation 1  Surface: level tile  Device: Rolling Walker  Assistance: Contact guard assistance;Stand by assistance  Quality of Gait: FF posture with increased bracing on [address], decreased foot clearance on L, slow angel, B decreased step length, increased difficulty with turns and [employer]. Distance: 150'x2  Comments: Pt states without the shot he would not be able to walk. Neuromuscular Education  Neuromuscular Comments: static standing with weight shifting;  Ambulate 6' without WW with focus on balance and posture ASSESSMENT:  Body structures, Functions, Activity limitations: Decreased functional mobility ; Decreased ROM; Decreased strength;Decreased balance;Decreased endurance; Increased Pain    Assessment: Good participation and motivation. Pt gets parkinson's shot prior to therapy which allows his body to perform transfers/ambulation and therex. Activity Tolerance  Activity Tolerance: Patient Tolerated treatment well  Activity Tolerance: Pt had parkinson's shot prior to tx. Discharge Recommendations:  Continue to assess pending progress    Goals:  Short term goals  Short term goal 1: Pt will perform bed mob/transfers with Dhruv to increase functional indep  Short term goal 2: Pt will stand at [facility] >/=2min with Dhruv to increase functional activity tolerance  Short term goal 3: Pt will demo improved seated and standing bal >/=1/2 grade to increase safety with functional mobility   Short term goal 4: Pt will demo improved B LE strength as evidenced by increased ease with transfers     PLAN:   Plan  Times per week: 3-6  Current Treatment Recommendations: Strengthening, ROM, Balance Training, Transfer Training, Functional Mobility Training, Endurance Training, Wheelchair Mobility Training, Neuromuscular Re-education, Pain Management, Home Exercise Program, Safety Education & Training, Patient/Caregiver Education & Training, Equipment Evaluation, Education, & procurement, Positioning  Plan Comment: Cont.  POC  Safety Devices  Type of devices: (Pt in power W/C before and after tx in 3 [facility] with nsg staff present )     [facility] (6 CLICK) BASIC MOBILITY  AM-PAC Inpatient Mobility Raw Score : 16      Therapy Time   Individual   Time In 1531   Time Out 1610   Minutes 39      bm/Trsf - 15 mins  Gait - 15 mins  NMR - 9 mins       August Piedra PTA, 06/04/19 at 4:22 PM

## 2019-06-04 NOTE — PROGRESS NOTES
Pt. refused to attend the 1000 skills group, despite staff encouragement. Electronically signed by Shoshana Yu, [address] on 6/4/2019 at 1:31 PM

## 2019-06-05 ENCOUNTER — APPOINTMENT (OUTPATIENT)
Dept: POSTOP/PACU | Age: 64
DRG: 751 | End: 2019-06-05
Attending: PSYCHIATRY & NEUROLOGY
Payer: COMMERCIAL

## 2019-06-05 ENCOUNTER — ANESTHESIA (OUTPATIENT)
Dept: POSTOP/PACU | Age: 64
End: 2019-06-05

## 2019-06-05 ENCOUNTER — PREP FOR PROCEDURE (OUTPATIENT)
Dept: PSYCHIATRY | Age: 64
End: 2019-06-05

## 2019-06-05 ENCOUNTER — ANESTHESIA EVENT (OUTPATIENT)
Dept: POSTOP/PACU | Age: 64
End: 2019-06-05

## 2019-06-05 VITALS
SYSTOLIC BLOOD PRESSURE: 141 MMHG | DIASTOLIC BLOOD PRESSURE: 79 MMHG | OXYGEN SATURATION: 97 % | RESPIRATION RATE: 20 BRPM

## 2019-06-05 PROCEDURE — 3700000000 HC ANESTHESIA ATTENDED CARE

## 2019-06-05 PROCEDURE — 7100000001 HC PACU RECOVERY - ADDTL 15 MIN

## 2019-06-05 PROCEDURE — 6370000000 HC RX 637 (ALT 250 FOR IP): Performed by: PSYCHIATRY & NEUROLOGY

## 2019-06-05 PROCEDURE — 7100000000 HC PACU RECOVERY - FIRST 15 MIN

## 2019-06-05 PROCEDURE — 90870 ELECTROCONVULSIVE THERAPY: CPT | Performed by: PSYCHIATRY & NEUROLOGY

## 2019-06-05 PROCEDURE — 2580000003 HC RX 258: Performed by: PSYCHIATRY & NEUROLOGY

## 2019-06-05 PROCEDURE — 6370000000 HC RX 637 (ALT 250 FOR IP): Performed by: ANESTHESIOLOGY

## 2019-06-05 PROCEDURE — 6360000002 HC RX W HCPCS: Performed by: ANESTHESIOLOGY

## 2019-06-05 PROCEDURE — 2500000003 HC RX 250 WO HCPCS: Performed by: ANESTHESIOLOGY

## 2019-06-05 PROCEDURE — 1240000000 HC EMOTIONAL WELLNESS R&B

## 2019-06-05 PROCEDURE — 90870 ELECTROCONVULSIVE THERAPY: CPT

## 2019-06-05 RX ORDER — HYDROCODONE BITARTRATE AND ACETAMINOPHEN 5; 325 MG/1; MG/1
1 TABLET ORAL PRN
Status: ACTIVE | OUTPATIENT
Start: 2019-06-05 | End: 2019-06-05

## 2019-06-05 RX ORDER — HYDROCODONE BITARTRATE AND ACETAMINOPHEN 5; 325 MG/1; MG/1
2 TABLET ORAL PRN
Status: ACTIVE | OUTPATIENT
Start: 2019-06-05 | End: 2019-06-05

## 2019-06-05 RX ORDER — SODIUM CHLORIDE 0.9 % (FLUSH) 0.9 %
10 SYRINGE (ML) INJECTION PRN
Status: CANCELLED | OUTPATIENT
Start: 2019-06-05

## 2019-06-05 RX ORDER — 0.9 % SODIUM CHLORIDE 0.9 %
500 INTRAVENOUS SOLUTION INTRAVENOUS ONCE
Status: COMPLETED | OUTPATIENT
Start: 2019-06-05 | End: 2019-06-05

## 2019-06-05 RX ORDER — MEPERIDINE HYDROCHLORIDE 25 MG/ML
12.5 INJECTION INTRAMUSCULAR; INTRAVENOUS; SUBCUTANEOUS EVERY 5 MIN PRN
Status: DISCONTINUED | OUTPATIENT
Start: 2019-06-05 | End: 2019-06-06 | Stop reason: HOSPADM

## 2019-06-05 RX ORDER — 0.9 % SODIUM CHLORIDE 0.9 %
500 INTRAVENOUS SOLUTION INTRAVENOUS ONCE
Status: CANCELLED | OUTPATIENT
Start: 2019-06-05 | End: 2019-06-05

## 2019-06-05 RX ORDER — DIPHENHYDRAMINE HYDROCHLORIDE 50 MG/ML
12.5 INJECTION INTRAMUSCULAR; INTRAVENOUS
Status: ACTIVE | OUTPATIENT
Start: 2019-06-05 | End: 2019-06-05

## 2019-06-05 RX ORDER — SUCCINYLCHOLINE/SOD CL,ISO/PF 100 MG/5ML
SYRINGE (ML) INTRAVENOUS PRN
Status: DISCONTINUED | OUTPATIENT
Start: 2019-06-05 | End: 2019-06-05 | Stop reason: SDUPTHER

## 2019-06-05 RX ORDER — FENTANYL CITRATE 50 UG/ML
50 INJECTION, SOLUTION INTRAMUSCULAR; INTRAVENOUS EVERY 10 MIN PRN
Status: DISCONTINUED | OUTPATIENT
Start: 2019-06-05 | End: 2019-06-06 | Stop reason: HOSPADM

## 2019-06-05 RX ORDER — SODIUM CHLORIDE 0.9 % (FLUSH) 0.9 %
10 SYRINGE (ML) INJECTION EVERY 12 HOURS SCHEDULED
Status: CANCELLED | OUTPATIENT
Start: 2019-06-05

## 2019-06-05 RX ORDER — METOCLOPRAMIDE HYDROCHLORIDE 5 MG/ML
10 INJECTION INTRAMUSCULAR; INTRAVENOUS
Status: ACTIVE | OUTPATIENT
Start: 2019-06-05 | End: 2019-06-05

## 2019-06-05 RX ADMIN — ACETAMINOPHEN 650 MG: 325 TABLET ORAL at 03:46

## 2019-06-05 RX ADMIN — BACLOFEN 5 MG: 10 TABLET ORAL at 13:57

## 2019-06-05 RX ADMIN — BACLOFEN 5 MG: 10 TABLET ORAL at 03:45

## 2019-06-05 RX ADMIN — PRAVASTATIN SODIUM 20 MG: 10 TABLET ORAL at 20:42

## 2019-06-05 RX ADMIN — CARBIDOPA AND LEVODOPA 1 TABLET: 25; 100 TABLET ORAL at 13:57

## 2019-06-05 RX ADMIN — TRAZODONE HYDROCHLORIDE 50 MG: 50 TABLET ORAL at 20:42

## 2019-06-05 RX ADMIN — APOMORPHINE HYDROCHLORIDE 0.4 ML: 30 INJECTION SUBCUTANEOUS at 20:52

## 2019-06-05 RX ADMIN — SODIUM CHLORIDE 500 ML: 9 INJECTION, SOLUTION INTRAVENOUS at 11:42

## 2019-06-05 RX ADMIN — ROTIGOTINE 1 PATCH: 2 PATCH, EXTENDED RELEASE TRANSDERMAL at 09:03

## 2019-06-05 RX ADMIN — ACETAMINOPHEN 650 MG: 325 TABLET ORAL at 17:48

## 2019-06-05 RX ADMIN — APOMORPHINE HYDROCHLORIDE 0.4 ML: 30 INJECTION SUBCUTANEOUS at 04:21

## 2019-06-05 RX ADMIN — MORPHINE SULFATE 15 MG: 15 TABLET, FILM COATED, EXTENDED RELEASE ORAL at 21:17

## 2019-06-05 RX ADMIN — FINASTERIDE 5 MG: 5 TABLET, FILM COATED ORAL at 13:58

## 2019-06-05 RX ADMIN — ASPIRIN AND DIPYRIDAMOLE 1 CAPSULE: 25; 200 CAPSULE, EXTENDED RELEASE ORAL at 13:58

## 2019-06-05 RX ADMIN — TAMSULOSIN HYDROCHLORIDE 0.4 MG: 0.4 CAPSULE ORAL at 13:58

## 2019-06-05 RX ADMIN — SENNOSIDES AND DOCUSATE SODIUM 2 TABLET: 8.6; 5 TABLET ORAL at 20:43

## 2019-06-05 RX ADMIN — SUMATRIPTAN SUCCINATE 50 MG: 50 TABLET ORAL at 14:06

## 2019-06-05 RX ADMIN — LITHIUM CARBONATE 150 MG: 150 CAPSULE, GELATIN COATED ORAL at 20:46

## 2019-06-05 RX ADMIN — MIRTAZAPINE 30 MG: 30 TABLET, FILM COATED ORAL at 20:42

## 2019-06-05 RX ADMIN — CARBIDOPA AND LEVODOPA 1 TABLET: 25; 100 TABLET ORAL at 00:01

## 2019-06-05 RX ADMIN — CARBIDOPA AND LEVODOPA 1 TABLET: 25; 100 TABLET ORAL at 23:16

## 2019-06-05 RX ADMIN — CARBIDOPA AND LEVODOPA 1 TABLET: 25; 100 TABLET ORAL at 18:42

## 2019-06-05 RX ADMIN — ASPIRIN AND DIPYRIDAMOLE 1 CAPSULE: 25; 200 CAPSULE, EXTENDED RELEASE ORAL at 20:42

## 2019-06-05 RX ADMIN — LITHIUM CARBONATE 150 MG: 150 CAPSULE, GELATIN COATED ORAL at 13:58

## 2019-06-05 RX ADMIN — VITAMIN D, TAB 1000IU (100/BT) 2000 UNITS: 25 TAB at 13:57

## 2019-06-05 RX ADMIN — Medication 50 MG: at 12:12

## 2019-06-05 RX ADMIN — METHOHEXITAL SODIUM 100 MG: 500 INJECTION, POWDER, LYOPHILIZED, FOR SOLUTION INTRAMUSCULAR; INTRAVENOUS; RECTAL at 12:12

## 2019-06-05 RX ADMIN — PREGABALIN 100 MG: 100 CAPSULE ORAL at 20:42

## 2019-06-05 RX ADMIN — PREGABALIN 100 MG: 100 CAPSULE ORAL at 13:58

## 2019-06-05 RX ADMIN — MORPHINE SULFATE 15 MG: 15 TABLET, FILM COATED, EXTENDED RELEASE ORAL at 13:57

## 2019-06-05 RX ADMIN — APOMORPHINE HYDROCHLORIDE 0.4 ML: 30 INJECTION SUBCUTANEOUS at 17:38

## 2019-06-05 ASSESSMENT — PAIN SCALES - GENERAL
PAINLEVEL_OUTOF10: 5
PAINLEVEL_OUTOF10: 5
PAINLEVEL_OUTOF10: 7
PAINLEVEL_OUTOF10: 8
PAINLEVEL_OUTOF10: 6

## 2019-06-05 NOTE — FLOWSHEET NOTE
Vital signs completed post ECT. Patient stated that he is \"very tired\". Pain medication effective at this time. Vitals 132/71 HR 99 Spo2 97 RA R 16.

## 2019-06-05 NOTE — FLOWSHEET NOTE
Patient cooperative with assessment. Able to make needs known. Appear tired looking at this time. Up in electric wheelchair and on telephone, girlfriend no longer here. Complaints of pain reported to LPN at this time and PRN given. Denies SI, HI, and AVH. States he having depression and could elaborate on that.   States that he's having anxiety \"just worried about what am I gonna do next, what am I gonna do next\"

## 2019-06-05 NOTE — FLOWSHEET NOTE
Patient is alert and oriented. States that he is having very little anxiety and depression this evening. Social with peers. Sitting in dining room. Had snacks and coffee. Denies SI, HI, and AVH.

## 2019-06-05 NOTE — OP NOTE
Department of Psychiatry  Electroconvulsive Therapy Treatment Note        6/5/2019    PURPOSE FOR ECT:  Therapeutic NUMBER: 2    DIAGNOSIS:   Active Problems:    MDD (major depressive disorder), recurrent episode (HCC)    Severe episode of recurrent major depressive disorder, without psychotic features (HCC)    Chronic pain    Generalized muscle ache    Muscle spasticity    Parkinson disease, symptomatic ([facility]  Resolved Problems:    Primary Parkinsonism ([facility]      MEDICATIONS:    Current Facility-Administered Medications:     0.9 % sodium chloride bolus, 500 mL, Intravenous, Once, Roseline Jin MD, Last Rate: 500 mL/hr at 06/05/19 1142, 500 mL at 06/05/19 1142    fentaNYL (SUBLIMAZE) injection 50 mcg, 50 mcg, Intravenous, Q10 Min PRN, Carlos Doe MD    HYDROmorphone (DILAUDID) injection 0.5 mg, 0.5 mg, Intravenous, Q10 Min PRN, Carlos Doe MD    HYDROcodone-acetaminophen (NORCO) 5-325 MG per tablet 1 tablet, 1 tablet, Oral, PRN **OR** HYDROcodone-acetaminophen (NORCO) 5-325 MG per tablet 2 tablet, 2 tablet, Oral, PRN, Carlos Doe MD    diphenhydrAMINE (BENADRYL) injection 12.5 mg, 12.5 mg, Intravenous, Once PRN, Carlos Doe MD    metoclopramide (REGLAN) injection 10 mg, 10 mg, Intravenous, Once PRN, Carlos Doe MD    meperidine (DEMEROL) injection 12.5 mg, 12.5 mg, Intravenous, Q5 Min PRN, Carlos Doe MD    sodium chloride flush 0.9 % injection 10 mL, 10 mL, Intravenous, PRN, Roseline Jin MD    traZODone (DESYREL) tablet 50 mg, 50 mg, Oral, Nightly, Roseline Jin MD, 50 mg at 06/04/19 2122    lidocaine 4 % external patch 3 patch, 3 patch, Transdermal, Daily, Darío Sinha MD, 1 patch at 06/05/19 1020    morphine (MS CONTIN) extended release tablet 15 mg, 15 mg, Oral, 3 times per day, Darío Sinha MD, Stopped at 06/05/19 0418    baclofen (LIORESAL) tablet 5 mg, 5 mg, Oral, Q6H PRN, Darío Sinha MD, 5 mg at 06/05/19 0345    acetaminophen (TYLENOL) tablet 650 mg, 650 mg, Oral, Q4H PRN, Doug Mart MD, 650 mg at 06/05/19 0346    SUMAtriptan (IMITREX) tablet 50 mg, 50 mg, Oral, Daily PRN, Brandan Ibarra MD    pravastatin (PRAVACHOL) tablet 20 mg, 20 mg, Oral, Nightly, Brandan Ibarra MD, 20 mg at 06/04/19 2120    Apomorphine HCl SOCT 0.4 mL, 0.4 mL, Subcutaneous, Q2H PRN, Brandan Ibarra MD, 0.4 mL at 06/05/19 0421    carbidopa-levodopa (SINEMET)  MG per tablet 1 tablet, 1 tablet, Oral, 5x Daily, Brandan Ibarra MD, Stopped at 06/05/19 0700    Carbidopa-Levodopa ER 36. MG CPCR 1 capsule, 1 capsule, Oral, TID, Brandan Ibarra MD    vitamin D (CHOLECALCIFEROL) tablet 2,000 Units, 2,000 Units, Oral, QAM, Brandan Ibarra MD, 2,000 Units at 06/04/19 1019    dipyridamole-aspirin (AGGRENOX)  MG per extended release capsule 1 capsule, 1 capsule, Oral, BID, Brandan Ibarra MD, 1 capsule at 06/04/19 2120    finasteride (PROSCAR) tablet 5 mg, 5 mg, Oral, Daily, Brandan Ibarra MD, 5 mg at 06/04/19 1019    lithium capsule 150 mg, 150 mg, Oral, BID, Brandan Ibarra MD, 150 mg at 06/04/19 2121    mirtazapine (REMERON) tablet 30 mg, 30 mg, Oral, Nightly, Brandan Ibarra MD, 30 mg at 06/04/19 2120    pregabalin (LYRICA) capsule 100 mg, 100 mg, Oral, BID, Brandan Ibarra MD, 100 mg at 06/04/19 2121    prochlorperazine (COMPAZINE) tablet 10 mg, 10 mg, Oral, Q6H PRN, Brandan Ibarra MD    rotigotine (NEUPRO) 2 MG/24HR 1 patch, 1 patch, Transdermal, Daily, Brandan Ibarra MD, 1 patch at 06/05/19 0903    sennosides-docusate sodium (SENOKOT-S) 8.6-50 MG tablet 2 tablet, 2 tablet, Oral, Nightly, Brandan Ibarra MD, 2 tablet at 06/04/19 2120    tamsulosin (FLOMAX) capsule 0.4 mg, 0.4 mg, Oral, Daily, Brandan Ibarra MD, 0.4 mg at 06/04/19 1019    nicotine polacrilex (NICORETTE) gum 4 mg, 4 mg, Oral, PRN, Brandan Ibarra MD, 4 mg at 06/04/19 1019    Facility-Administered Medications Ordered in Other Encounters:     methohexital (BREVITAL SODIUM) injection, , , PRN, Elvira Tai MD, 100 mg at 06/05/19 1212    succinylcholine chloride (ANECTINE) injection, , , PRN, Elvira Tai MD, 50 mg at 06/05/19 1212    CHANGE IN PSYCHIATRY STATUS SINCE LAST ECT:   No change    INFORMED CONSENT OBTAINED : YES    PRE ECT MEDICATION ADMINISTERED:   YES    NPO STATUS: Confirmed    ELECTRODE PLACEMENT : RUL    TIME OUT :  TEAM CONFIRMS THE CORRECT PATIENT, CORRECT PROCEDURE AND CORRECT SITE.     DEVICE PARAMETERS:   Charge: 230  PW- 0.3  Freq- 60  Duration- 8  EEG- 33  Motor- 20        VITALS:   Vitals:    06/05/19 1115   BP: (!) 141/79   Pulse: 97   Resp: 18   Temp:    SpO2: 04%         COMPLICATIONS: no      RECOMMENDATIONS FOR NEXT TREATMENT:  friday        PSYCHIATRIST:  Sin Holt

## 2019-06-05 NOTE — PROGRESS NOTES
Pt opening eyes to name, oral airway removed, O2 maintained at 3L NC, SAO2 98%, breath sounds clear to anterior auscultation. MP RSR.

## 2019-06-05 NOTE — CARE COORDINATION
Brief Intervention and Referral to Treatment Summary    Patient was provided PHQ-9, AUDIT and DAST Screening:      PHQ-9 Score: 15  AUDIT Score:  0  DAST Score:  0    Patients substance use is considered     Low Risk/Healthy x  Moderate Risk  Harmful  Dependent    Patients depression is considered:     Minimal  Mild   Moderate  Moderately Severe x  Severe    Brief Education Was Provided    Patient was receptive to education regarding treatment options for depression. Brief Intervention Is Provided (Only for AUDIT or DAST)     Patient denied any AOD use and  denies readiness to decrease and/or stop use and a plan was not discussed    Recommendations/Referrals for Brief and/or Specialized Treatment Provided to Patient     medication management, group/individual therapies, family meetings, psycho -education, treatment team meetings to assist with stabilization  Patient stated willingness to engage in treatment.   Electronically signed by Sydnie Saldaña on 6/1/2019 at 7:55 AM
Called and spoke to Levi Garsia in [facility] to make her aware patient will be discharged there tomorrow afternoon. They said to use [facility] to transport. [id number]  [address] , [address] [facility]  Fax: [phone]    Admitting doctor Dr. Joshua Dasilva   # [phone]   nurse to nurse report at above number [facility]     Electronically signed by Jewell Najera [facility] on 6/5/2019 at 1:56 PM
Patient did not attend group despite staff encouragement.
Patient did not attend group despite staff encouragement.
Patient did not attend group despite staff encouragement.   Electronically signed by Charisma Dangelo on 6/1/2019 at 11:53 AM
Patient was not able to attend 11am group due to having ECT tx.
supported. At this time patient denied SI/HI and also denied any A/V hallucinations.   Electronically signed by Jose Lucero on 6/1/2019 at 7:53 AM

## 2019-06-05 NOTE — GROUP NOTE
Group Therapy Note    Date: June 4    Group Start Time: 2055  Group End Time: 2110  Group Topic: Wrap-Up    [facility]    Teddy Pardo        Group Therapy Note    Attendees: 6    Pt attended and participated in group. Status After Intervention:  Improved    Participation Level:  Active Listener and Interactive    Participation Quality: Appropriate and Attentive      Speech:  normal      Thought Process/Content: Logical      Affective Functioning: Congruent      Mood: euthymic      Level of consciousness:  Alert, Oriented x4 and Attentive      Response to Learning: Able to verbalize current knowledge/experience and Progressing to goal      Endings: None Reported    Modes of Intervention: Support and Socialization      Discipline Responsible: Behavorial Health Tech      Signature:  Teddy Pardo

## 2019-06-05 NOTE — ANESTHESIA PRE PROCEDURE
Department of Anesthesiology  Preprocedure Note       Name:  Melina Weber   Age:  66 y.o.  :  1955                                          MRN:  [id number]         Date:  2019      Surgeon: * No surgeons listed *    Procedure: ECT W/ ANESTHESIA    Medications prior to admission:   Prior to Admission medications    Medication Sig Start Date End Date Taking? Authorizing Provider   Apomorphine HCl (APOKYN) 30 MG/3ML SOCT Inject 0.4 mLs into the skin every 2 hours as needed (must be given two hours apart (not to exceed a total of 5 doses a day)) Must be given 2 hours apart. Not to exceed 5 doses per day. Yes Historical Provider, MD   dipyridamole-aspirin (AGGRENOX)  MG per extended release capsule Take 1 capsule by mouth 2 times daily   Yes Historical Provider, MD   Menthol, Topical Analgesic, (BIOFREEZE) 4 % GEL Apply topically 3 times daily   Yes Historical Provider, MD   Carbidopa-Levodopa ER (RYTARY) 36. MG CPCR Take 1 capsule by mouth 3 times daily   Yes Historical Provider, MD   sennosides-docusate sodium (SENOKOT-S) 8.6-50 MG tablet Take 2 tablets by mouth nightly   Yes Historical Provider, MD   morphine-naltrexone (EMBEDA) 20-0.8 MG CPCR Take 1 capsule by mouth 2 times daily. .   Yes Historical Provider, MD   diazepam (VALIUM) 5 MG tablet Take 5 mg by mouth 2 times daily as needed for Anxiety. Yes Historical Provider, MD   sertraline (ZOLOFT) 50 MG tablet Take 50 mg by mouth daily   Yes Historical Provider, MD   tamsulosin (FLOMAX) 0.4 MG capsule Take 1 capsule by mouth daily 1/15/19  Yes Autunm Ross MD   finasteride (PROSCAR) 5 MG tablet Take 1 tablet by mouth daily 1/15/19  Yes Autumn Ross MD   pregabalin (LYRICA) 100 MG capsule Take 1 capsule by mouth 2 times daily for 30 days. . 18 Yes Severiano Cartwright, MD   rotigotine (NEUPRO) 2 MG/24HR Place 1 patch onto the skin daily   Yes Historical Provider, MD   lithium 300 MG tablet Take 150 mg by mouth 2 times daily    Yes Historical Provider, MD   cyclobenzaprine (FLEXERIL) 10 MG tablet Take 10 mg by mouth 3 times daily as needed for Muscle spasms   Yes Historical Provider, MD   mirtazapine (REMERON) 15 MG tablet Take 30 mg by mouth nightly    Yes Historical Provider, MD   carbidopa-levodopa (SINEMET)  MG per tablet Take 1 tablet by mouth 5 times daily    Yes Historical Provider, MD   desvenlafaxine succinate (PRISTIQ) 25 MG TB24 extended release tablet Take 25 mg by mouth daily    Historical Provider, MD   zolpidem (AMBIEN) 5 MG tablet Take 5 mg by mouth nightly as needed for Sleep. [employer] Historical Provider, MD   vitamin B-12 (CYANOCOBALAMIN) 1000 MCG tablet Take 1,000 mcg by mouth daily    Historical Provider, MD   acetaminophen (TYLENOL) 325 MG tablet Take 650 mg by mouth 4 times daily     Historical Provider, MD   Thiamine HCl (VITAMIN B-1 PO) Take 100 mg by mouth every morning     Historical Provider, MD   Cholecalciferol (VITAMIN D3) 2000 units TABS Take 2,000 Units by mouth every morning    Historical Provider, MD   prochlorperazine (COMPAZINE) 10 MG tablet Take 1 tablet by mouth every 6 hours as needed for Nausea for 30 days.  9/17/12 10/17/12  Pedrito Abraham MD       Current medications:    Current Facility-Administered Medications   Medication Dose Route Frequency Provider Last Rate Last Dose    sodium chloride flush 0.9 % injection 10 mL  10 mL Intravenous PRN Roseline Jin MD        traZODone (DESYREL) tablet 50 mg  50 mg Oral Nightly Roseline Jin MD   50 mg at 06/04/19 2122    lidocaine 4 % external patch 3 patch  3 patch Transdermal Daily aDrío Sinha MD   1 patch at 06/04/19 1101    morphine (MS CONTIN) extended release tablet 15 mg  15 mg Oral 3 times per day Darío Sinha MD   Stopped at 06/05/19 0418    baclofen (LIORESAL) tablet 5 mg  5 mg Oral Q6H PRN Darío Sinha MD   5 mg at 06/05/19 0345    acetaminophen (TYLENOL) tablet 650 mg  650 mg Oral Q4H PRN Darío Sinha MD   650 mg at 06/05/19 Hcl]        Problem List:    Patient Active Problem List   Diagnosis Code    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    Hepatitis C B19.20    Cerebral infarction ([address] I63.9    MI (myocardial infarction) ([address] I21.9    Left hemiplegia (HCC) G81.94    Hep C w/o coma, chronic (HCC) B18.2    Stroke ([address] I63.9    MI (myocardial infarction) ([address] I21.9    Leukopenia D72.819    Hepatitis C B19.20    CAD (coronary artery disease) I25.10    Pneumonia J18.9    Kidney mass N28.89    Pancytopenia ([address] D61.818    HCAP (healthcare-associated pneumonia) J18.9    Altered mental status R41.82    Urinary retention R33.9    MDD (major depressive disorder), recurrent episode ([address] F33.9    Severe episode of recurrent major depressive disorder, without psychotic features ([address] F33.2    Chronic pain G89.29    Generalized muscle ache M79.10    Muscle spasticity M62.838    Parkinson disease, symptomatic ([address] Slick Lopez       Past Medical History:        Diagnosis Date    CAD (coronary artery disease)     CVA (cerebral infarction) 2007, 2008    x2    Hemiplegia as late effect of cerebrovascular accident ([address] 2012    This is heavily debated. MRI shows small vessel CVD and nothing to suggest significant prior stroke to leave hemiplegia. Neurology has suggested malingering.     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HCC)     Pneumonia     Stroke ([address]     X2       Past Surgical History:        Procedure Laterality Date    BONE RESECTION, RIB      BONY  TUMOR    BONY PELVIS SURGERY  1982    CORONARY ANGIOPLASTY WITH STENT PLACEMENT      X2    CORONARY ANGIOPLASTY WITH STENT PLACEMENT      x2    KIDNEY CYST REMOVAL      benign    KIDNEY SURGERY      TUMOR AND STONE REMOVED       Social History:    Social History     Tobacco Use    Smoking status: Former Smoker     Last attempt to quit: 2006     Years since quittin.2    Smokeless tobacco: Never Used   Substance Use Topics    Alcohol use: Yes     Alcohol/week: 0.6 oz     Types: 1 Cans of beer per week     Comment: occasional                                Counseling given: Not Answered      Vital Signs (Current):   Vitals:    19 1550 19 1700 19 0907 19 0854   BP: 128/80 108/66 105/68 136/76   Pulse: 99 94 81 92   Resp: 16 16  20   Temp: 97 °F (36.1 °C) 98 °F (36.7 °C)  97 °F (36.1 °C)   TempSrc: Oral Oral  Oral   SpO2: 97% 96% 97% 100%                                              BP Readings from Last 3 Encounters:   19 136/76   19 138/75   19 132/80       NPO Status: Time of last liquid consumption: 400                        Time of last solid consumption: 040                        Date of last liquid consumption: 19                        Date of last solid food consumption: 19    BMI:   Wt Readings from Last 3 Encounters:   19 220 lb (99.8 kg)   19 200 lb (90.7 kg)   19 200 lb (90.7 kg)     There is no height or weight on file to calculate BMI.    CBC:   Lab Results   Component Value Date    WBC 6.9 2019    RBC 4.71 2019    RBC 4.25 2012    HGB 13.5 2019    HCT 40.7 2019    MCV 86.4 2019    RDW 14.7 2019     2019       CMP:   Lab Results   Component Value Date     2019    K 4.1 2019     2019    CO2 27 2019    BUN 11 2019    CREATININE 0.57 2019    GFRAA >60.0 2019    LABGLOM >60.0 2019    GLUCOSE 112 2019    GLUCOSE 92 2012    PROT 8.1 2019    CALCIUM 9.5 2019    BILITOT <0.2 2019    ALKPHOS 95 2019    AST 18 2019    ALT <5 2019       POC Tests: No results for input(s): POCGLU, POCNA, POCK, POCCL, POCBUN, POCHEMO, POCHCT in the last 72 hours.     Coags:   Lab Results   Component Value Date    PROTIME 10.2 01/11/2019    PROTIME 10.8 02/20/2012    INR 1.0 01/11/2019    APTT 25.4 03/31/2013       HCG (If Applicable): No results found for: PREGTESTUR, PREGSERUM, HCG, HCGQUANT     ABGs: No results found for: PHART, PO2ART, BMQ8LMZ, MWF0ELN, BEART, P2BGMMTJ     Type & Screen (If Applicable):  No results found for: LABABO, [address]    Anesthesia Evaluation  Patient summary reviewed and Nursing notes reviewed  Airway: Mallampati: II  TM distance: >3 FB   Neck ROM: full  Mouth opening: > = 3 FB Dental:    (+) edentulous      Pulmonary:   (+) pneumonia:                             Cardiovascular:    (+) past MI:, CAD:,       ECG reviewed                        Neuro/Psych:   (+) CVA:, neuromuscular disease: Parkinson's disease, psychiatric history:depression/anxiety             GI/Hepatic/Renal:   (+) hepatitis: C, liver disease:,           Endo/Other: Negative Endo/Other ROS                    Abdominal:           Vascular:                                      Anesthesia Plan      general     ASA 3       Induction: intravenous. Anesthetic plan and risks discussed with patient. Plan discussed with CRNA.     Attending anesthesiologist reviewed and agrees with Jeremy Brown MD   6/5/2019

## 2019-06-05 NOTE — PROGRESS NOTES
Pt noted up on unit talking on the phone explained and gave all am meds. gave ms cot 15mg for pain to back. Pt swollowed well, appeared to be tired stated his headache got worse after being here on the unit, offered and gave imitrex, encouraged to let staff know if worsens and of any other problems. encourgaged a nap. informed rn.  Pt ate good lunch

## 2019-06-05 NOTE — ANESTHESIA POSTPROCEDURE EVALUATION
Department of Anesthesiology  Postprocedure Note    Patient: Penny Muller  MRN: [id number]  YOB: 1955  Date of evaluation: 6/5/2019  Time:  12:28 PM     Procedure Summary     Date:  06/05/19 Room / Location:  [facility] PACU    Anesthesia Start:  1210 Anesthesia Stop:  1219    Procedure:  ECT W/ ANESTHESIA Diagnosis:       MDD (major depressive disorder), recurrent episode ([facility] [facility]      Severe episode of recurrent major depressive disorder, without psychotic features ([facility]    Scheduled Providers:   Responsible Provider:  Hermelindo Solano MD    Anesthesia Type:  general ASA Status:  3          Anesthesia Type: general    Francie Phase I: Francie Score: 9    Francie Phase II:      Last vitals: Reviewed and per EMR flowsheets.        Anesthesia Post Evaluation    Patient location during evaluation: PACU  Patient participation: complete - patient participated  Level of consciousness: awake and alert  Pain score: 0  Airway patency: patent  Nausea & Vomiting: no nausea and no vomiting  Complications: no  Cardiovascular status: blood pressure returned to baseline and hemodynamically stable  Respiratory status: acceptable and nasal cannula  Hydration status: euvolemic

## 2019-06-05 NOTE — PROGRESS NOTES
Pt. attended the 0900 community meeting. Electronically signed by Chidi Pham [address] on 6/5/2019 at 9:52 AM

## 2019-06-06 VITALS
DIASTOLIC BLOOD PRESSURE: 69 MMHG | TEMPERATURE: 98.6 F | HEART RATE: 84 BPM | OXYGEN SATURATION: 98 % | SYSTOLIC BLOOD PRESSURE: 134 MMHG | RESPIRATION RATE: 18 BRPM

## 2019-06-06 PROCEDURE — 6370000000 HC RX 637 (ALT 250 FOR IP): Performed by: ANESTHESIOLOGY

## 2019-06-06 PROCEDURE — 99239 HOSP IP/OBS DSCHRG MGMT >30: CPT | Performed by: PSYCHIATRY & NEUROLOGY

## 2019-06-06 PROCEDURE — 6370000000 HC RX 637 (ALT 250 FOR IP): Performed by: PSYCHIATRY & NEUROLOGY

## 2019-06-06 RX ORDER — SUMATRIPTAN 50 MG/1
50 TABLET, FILM COATED ORAL DAILY PRN
Refills: 0 | DISCHARGE
Start: 2019-06-06 | End: 2019-08-16

## 2019-06-06 RX ORDER — TRAZODONE HYDROCHLORIDE 50 MG/1
50 TABLET ORAL NIGHTLY
DISCHARGE
Start: 2019-06-06 | End: 2019-06-19 | Stop reason: SDUPTHER

## 2019-06-06 RX ORDER — PRAVASTATIN SODIUM 20 MG
20 TABLET ORAL NIGHTLY
Refills: 0 | DISCHARGE
Start: 2019-06-06 | End: 2019-08-16 | Stop reason: SDUPTHER

## 2019-06-06 RX ADMIN — APOMORPHINE HYDROCHLORIDE 0.4 ML: 30 INJECTION SUBCUTANEOUS at 01:07

## 2019-06-06 RX ADMIN — ASPIRIN AND DIPYRIDAMOLE 1 CAPSULE: 25; 200 CAPSULE, EXTENDED RELEASE ORAL at 08:14

## 2019-06-06 RX ADMIN — APOMORPHINE HYDROCHLORIDE 0.4 ML: 30 INJECTION SUBCUTANEOUS at 06:02

## 2019-06-06 RX ADMIN — MORPHINE SULFATE 15 MG: 15 TABLET, FILM COATED, EXTENDED RELEASE ORAL at 05:53

## 2019-06-06 RX ADMIN — APOMORPHINE HYDROCHLORIDE 0.4 ML: 30 INJECTION SUBCUTANEOUS at 11:40

## 2019-06-06 RX ADMIN — PREGABALIN 100 MG: 100 CAPSULE ORAL at 08:13

## 2019-06-06 RX ADMIN — ROTIGOTINE 1 PATCH: 2 PATCH, EXTENDED RELEASE TRANSDERMAL at 08:13

## 2019-06-06 RX ADMIN — FINASTERIDE 5 MG: 5 TABLET, FILM COATED ORAL at 08:13

## 2019-06-06 RX ADMIN — LITHIUM CARBONATE 150 MG: 150 CAPSULE, GELATIN COATED ORAL at 08:13

## 2019-06-06 RX ADMIN — VITAMIN D, TAB 1000IU (100/BT) 2000 UNITS: 25 TAB at 08:13

## 2019-06-06 RX ADMIN — TAMSULOSIN HYDROCHLORIDE 0.4 MG: 0.4 CAPSULE ORAL at 08:13

## 2019-06-06 RX ADMIN — CARBIDOPA AND LEVODOPA 1 TABLET: 25; 100 TABLET ORAL at 05:53

## 2019-06-06 RX ADMIN — CARBIDOPA AND LEVODOPA 1 TABLET: 25; 100 TABLET ORAL at 10:55

## 2019-06-06 ASSESSMENT — PAIN SCALES - GENERAL: PAINLEVEL_OUTOF10: 8

## 2019-06-06 NOTE — PROGRESS NOTES
Patient did not attend the 0900 community meeting due to having a visitor. Electronically signed by David Ortega[address] on 6/6/2019 at 10:04 AM

## 2019-06-06 NOTE — DISCHARGE SUMMARY
DISCHARGE SUMMARY      Patient ID:  Joel Mcdonald  [id number]  44 y.o.  1955    Admit date: 5/31/2019    Discharge date and time: 6/6/2019    Admitting Physician: Fabiana Teresa MD     Discharge Physician: Dr Andrea Brasher MD    Admission Diagnoses: MDD (major depressive disorder), recurrent episode ([facility] [F33.9]  Severe episode of recurrent major depressive disorder, without psychotic features ([facility] [F33.2]    Admission Condition: poor    Discharged Condition: stable    Admission Circumstance: The patient is a 66 y.o. male with significant past history of MDD  Pt has been feeling depressed for many months and has not been getting better  Severity: Rating mood to be around 2/10 (10- good)  Quality:melancholic  Worse in the mornign  Content: Hopeless, worthless and helpless feeling  Suicidal thoughts - passive  Associated symptoms:  Poor concentration, anhedonia, decrease motivation  Sleep and appetite- poor     Stressors:PD disabled, in NH setting for man years now, wife supportive     The patient is currently receiving care for the above psychiatric illness.     Medications Prior to Admission:     Prescriptions Prior to Admission   Medications Prior to Admission: Apomorphine HCl (APOKYN) 30 MG/3ML SOCT, Inject 0.4 mLs into the skin every 2 hours as needed (must be given two hours apart (not to exceed a total of 5 doses a day)) Must be given 2 hours apart. Not to exceed 5 doses per day. dipyridamole-aspirin (AGGRENOX)  MG per extended release capsule, Take 1 capsule by mouth 2 times daily  Menthol, Topical Analgesic, (BIOFREEZE) 4 % GEL, Apply topically 3 times daily  Carbidopa-Levodopa ER (RYTARY) 36. MG CPCR, Take 1 capsule by mouth 3 times daily  sennosides-docusate sodium (SENOKOT-S) 8.6-50 MG tablet, Take 2 tablets by mouth nightly  morphine-naltrexone (EMBEDA) 20-0.8 MG CPCR, Take 1 capsule by mouth 2 times daily. .  diazepam (VALIUM) 5 MG tablet, Take 5 mg by mouth 2 times daily as needed for Anxiety. sertraline (ZOLOFT) 50 MG tablet, Take 50 mg by mouth daily  tamsulosin (FLOMAX) 0.4 MG capsule, Take 1 capsule by mouth daily  finasteride (PROSCAR) 5 MG tablet, Take 1 tablet by mouth daily  pregabalin (LYRICA) 100 MG capsule, Take 1 capsule by mouth 2 times daily for 30 days. .  rotigotine (NEUPRO) 2 MG/24HR, Place 1 patch onto the skin daily  lithium 300 MG tablet, Take 150 mg by mouth 2 times daily   cyclobenzaprine (FLEXERIL) 10 MG tablet, Take 10 mg by mouth 3 times daily as needed for Muscle spasms  mirtazapine (REMERON) 15 MG tablet, Take 30 mg by mouth nightly   carbidopa-levodopa (SINEMET)  MG per tablet, Take 1 tablet by mouth 5 times daily   desvenlafaxine succinate (PRISTIQ) 25 MG TB24 extended release tablet, Take 25 mg by mouth daily  zolpidem (AMBIEN) 5 MG tablet, Take 5 mg by mouth nightly as needed for Sleep. .  vitamin B-12 (CYANOCOBALAMIN) 1000 MCG tablet, Take 1,000 mcg by mouth daily  acetaminophen (TYLENOL) 325 MG tablet, Take 650 mg by mouth 4 times daily   Thiamine HCl (VITAMIN B-1 PO), Take 100 mg by mouth every morning   Cholecalciferol (VITAMIN D3) 2000 units TABS, Take 2,000 Units by mouth every morning  prochlorperazine (COMPAZINE) 10 MG tablet, Take 1 tablet by mouth every 6 hours as needed for Nausea for 30 days.        Compliance:yes     Psychiatric Review of Systems       Depression: yes     Devorah or Hypomania:  no     Panic Attacks:  no     Phobias:  no     Obsessions and Compulsions:  no     PTSD : no     Hallucinations:  no     Delusions:  no     Substance Abuse History:  ETOH: no   Marijuana: no  Opiates: no  Other Drugs: no        Past Psychiatric History:  Prior Diagnosis:  Major depressive disorder; recurrent  Psychiatrist: Dr Rony Arellano  Therapist:no  Hospitalization: yes  Hx of Suicidal Attempts: no  Hx of violence:  no  ECT: no  Previous discontinued Psychiatric Med Trials: multiple medication trial failed            PAST MEDICAL/PSYCHIATRIC HISTORY: Past Medical History:   Diagnosis Date    CAD (coronary artery disease)     CVA (cerebral infarction) , 2008    x2    Hemiplegia as late effect of cerebrovascular accident ([facility] 2012    This is heavily debated. MRI shows small vessel CVD and nothing to suggest significant prior stroke to leave hemiplegia. Neurology has suggested malingering.  Hepatitis C     Hepatitis C 2011    Kidney mass     Leukopenia     MI (myocardial infarction) ([facility]     MI (myocardial infarction) ([facility] 2009    clean coronaries, no stenting - this is questionable    Osteosarcoma ([facility]     Pneumonia     [facility]roke [facility]     X2       FAMILY/SOCIAL HISTORY:  Family History   Problem Relation Age of Onset    Cancer Mother     High Blood Pressure Mother     Stroke Father     Heart Attack Father     Heart Disease Father     High Blood Pressure Father     Diabetes Other         GM     Social History     Socioeconomic History    Marital status: Single     Spouse name: Not on file    Number of children: Not on file    Years of education: Not on file    Highest education level: Not on file   Occupational History    Not on file   Social Needs    Financial resource strain: Not on file    Food insecurity:     Worry: Not on file     Inability: Not on file    Transportation needs:     Medical: Not on file     Non-medical: Not on file   Tobacco Use    Smoking status: Former Smoker     Last attempt to quit: 2006     Years since quittin.2    Smokeless tobacco: Never Used   Substance and Sexual Activity    Alcohol use:  Yes     Alcohol/week: 0.6 oz     Types: 1 Cans of beer per week     Comment: occasional    Drug use: No    Sexual activity: Yes     Partners: Female   Lifestyle    Physical activity:     Days per week: Not on file     Minutes per session: Not on file    Stress: Not on file   Relationships    Social connections:     Talks on phone: Not on file     Gets together: Not on file     Attends Mosque service: Not on file     Active member of club or organization: Not on file     Attends meetings of clubs or organizations: Not on file     Relationship status: Not on file    Intimate partner violence:     Fear of current or ex partner: Not on file     Emotionally abused: Not on file     Physically abused: Not on file     Forced sexual activity: Not on file   Other Topics Concern    Not on file   Social History Narrative         Type of Home: Facility    Home Equipment: Wheelchair-electric    ADL Assistance: Needs assistance    Ambulation Assistance: Needs assistance (pt reports he walks with therapy staff at [facility] +2 with no device)    Transfer Assistance: Independent                 MEDICATIONS:    Current Facility-Administered Medications:     fentaNYL (SUBLIMAZE) injection 50 mcg, 50 mcg, Intravenous, Q10 Min PRN, Derrick Painter MD    HYDROmorphone (DILAUDID) injection 0.5 mg, 0.5 mg, Intravenous, Q10 Min PRN, Derrcik Painter MD    meperidine (DEMEROL) injection 12.5 mg, 12.5 mg, Intravenous, Q5 Min PRN, Derrick Painter MD    sodium chloride flush 0.9 % injection 10 mL, 10 mL, Intravenous, PRN, Jewels Pond MD    traZODone (DESYREL) tablet 50 mg, 50 mg, Oral, Nightly, Jewels Pond MD, 50 mg at 06/05/19 2042    lidocaine 4 % external patch 3 patch, 3 patch, Transdermal, Daily, Marques Peralta MD, 1 patch at 06/06/19 0815    morphine (MS CONTIN) extended release tablet 15 mg, 15 mg, Oral, 3 times per day, Marques Peralta MD, 15 mg at 06/06/19 0553    baclofen (LIORESAL) tablet 5 mg, 5 mg, Oral, Q6H PRN, Marques Peralta MD, 5 mg at 06/05/19 1357    acetaminophen (TYLENOL) tablet 650 mg, 650 mg, Oral, Q4H PRN, Marques Peralta MD, 650 mg at 06/05/19 1748    SUMAtriptan (IMITREX) tablet 50 mg, 50 mg, Oral, Daily PRN, Jewels Pond MD, 50 mg at 06/05/19 1406    pravastatin (PRAVACHOL) tablet 20 mg, 20 mg, Oral, Nightly, Jewels Pond MD, 20 mg at 06/05/19 2042    Apomorphine HCl SOCT 0.4 mL, 0.4 mL, Subcutaneous, Q2H PRN, Lary Paul MD, 0.4 mL at 06/06/19 0602    carbidopa-levodopa (SINEMET)  MG per tablet 1 tablet, 1 tablet, Oral, 5x Daily, Lary Paul MD, 1 tablet at 06/06/19 0553    Carbidopa-Levodopa ER 36. MG CPCR 1 capsule, 1 capsule, Oral, TID, Lary Paul MD    vitamin D (CHOLECALCIFEROL) tablet 2,000 Units, 2,000 Units, Oral, QAM, Lary Paul MD, 2,000 Units at 06/06/19 0813    dipyridamole-aspirin (AGGRENOX)  MG per extended release capsule 1 capsule, 1 capsule, Oral, BID, Lary Paul MD, 1 capsule at 06/06/19 0814    finasteride (PROSCAR) tablet 5 mg, 5 mg, Oral, Daily, Lary Paul MD, 5 mg at 06/06/19 0813    lithium capsule 150 mg, 150 mg, Oral, BID, Lary Paul MD, 150 mg at 06/06/19 0813    mirtazapine (REMERON) tablet 30 mg, 30 mg, Oral, Nightly, Lary Paul MD, 30 mg at 06/05/19 2042    pregabalin (LYRICA) capsule 100 mg, 100 mg, Oral, BID, Lary Paul MD, 100 mg at 06/06/19 0813    prochlorperazine (COMPAZINE) tablet 10 mg, 10 mg, Oral, Q6H PRN, Lary Paul MD    rotigotine (NEUPRO) 2 MG/24HR 1 patch, 1 patch, Transdermal, Daily, Lary Paul MD, 1 patch at 06/06/19 0813    sennosides-docusate sodium (SENOKOT-S) 8.6-50 MG tablet 2 tablet, 2 tablet, Oral, Nightly, Lary Paul MD, 2 tablet at 06/05/19 2043    tamsulosin (FLOMAX) capsule 0.4 mg, 0.4 mg, Oral, Daily, Lary Paul MD, 0.4 mg at 06/06/19 0813    nicotine polacrilex (NICORETTE) gum 4 mg, 4 mg, Oral, PRN, Lary Paul MD, 4 mg at 06/04/19 1019    Examination:  BP (!) 150/84   Pulse 105   Temp 98.1 °F (36.7 °C) (Oral)   Resp 18   SpO2 98%   Gait - steady    HOSPITAL COURSE[de-identified]  Following admission to the hospital, patient had a complete physical exam and blood work up  Patient was monitored closely with suicide precaution  Patient was started on medication as listed below  Was encouraged to participate in group and other milieu activity  Patient started to feel better with this combination of treatment. Significant progress in the symptoms since admission. Mood better, with the score of 2/10 - bad  No AVH or paranoid thoughts  Less Hopeless or worthless feeling  No active SI/HI  Appetite:  [x] Normal  [] Increased  [] Decreased    Sleep:       [x] Normal  [] Fair       [] Poor            Energy:    [x] Normal  [] Increased  [] Decreased     SI [] Present  [x] Absent  HI  []Present  [x] Absent   Aggression:  [] yes  [] no  Patient is [x] able  [] unable to CONTRACT FOR SAFETY   Medication side effects(SE):  [x] None(Psych. Meds.) [] Other      Mental Status Examination on discharge:    Level of consciousness:  within normal limits   Appearance:  well-appearing  Behavior/Motor:  no abnormalities noted  Attitude toward examiner:  attentive and good eye contact  Speech:  spontaneous, normal rate and normal volume   Mood: anxious, depressed  Affect:  mood congruent  Thought processes:  linear   Thought content:  Suicidal Ideation:  denies suicidal ideation  Delusions:  no evidence of delusions  Perceptual Disturbance:  denies any perceptual disturbance  Cognition:  oriented to person, place, and time   Concentration intact  Memory intact  Insight good   Judgement fair   Fund of Knowledge adequate      ASSESSMENT:  Patient symptoms are:  [x] Well controlled  [x] Improving  [] Worsening  [] No change      Diagnosis:  Active Problems:    MDD (major depressive disorder), recurrent episode (HCC)    Severe episode of recurrent major depressive disorder, without psychotic features (HCC)    Chronic pain    Generalized muscle ache    Muscle spasticity    Parkinson disease, symptomatic ([address]  Resolved Problems:    Primary Parkinsonism ([address]      LABS:    No results for input(s): WBC, HGB, PLT in the last 72 hours.   Recent Labs     06/04/19  1054   K 4.1     No results for input(s): BILITOT, ALKPHOS, AST, ALT in the last 72 hours. Lab Results   Component Value Date    LABAMPH Neg 06/01/2019    BARBSCNU Neg 06/01/2019    LABBENZ POSITIVE 06/01/2019    OPIATESCREENURINE POSITIVE 06/01/2019    PHENCYCLIDINESCREENURINE Neg 06/01/2019    ETOH 233 03/31/2013     Lab Results   Component Value Date    TSH 2.680 05/31/2019     Lab Results   Component Value Date    LITHIUM 0.4 (L) 03/18/2019     No results found for: VALPROATE, CBMZ    RISK ASSESSMENT AT DISCHARGE: Low risk for suicide and homicide. Treatment Plan:    OUTPATIENT ECT Monday, Wednesday Friday starting tomorrow for another 10 treatment    Reviewed current Medications with the patient. Education provided on the complaince with treatment. Risks, benefits, side effects, drug-to-drug interactions and alternatives to treatment were discussed. Encourage patient to attend outpatient follow up appointment and therapy. Patient was advised to call the outpatient provider, visit the nearest ED or call 911 if symptoms are not manageable.      Patient's family member was contacted prior to the discharge.             Medication List      START taking these medications    pravastatin 20 MG tablet  Commonly known as:  PRAVACHOL  Take 1 tablet by mouth nightly     SUMAtriptan 50 MG tablet  Commonly known as:  IMITREX  Take 1 tablet by mouth daily as needed for Migraine (post ECT head ache on the day of ECT)     traZODone 50 MG tablet  Commonly known as:  DESYREL  Take 1 tablet by mouth nightly        CONTINUE taking these medications    acetaminophen 325 MG tablet  Commonly known as:  TYLENOL     AMBIEN 5 MG tablet  Generic drug:  zolpidem     APOKYN 30 MG/3ML Soct  Generic drug:  Apomorphine HCl     BIOFREEZE 4 % Gel  Generic drug:  Menthol (Topical Analgesic)     * carbidopa-levodopa  MG per tablet  Commonly known as:  SINEMET     * RYTARY 36. MG Cpcr  Generic drug:  Carbidopa-Levodopa ER     cyclobenzaprine 10 MG tablet  Commonly known as:  FLEXERIL dipyridamole-aspirin  MG per extended release capsule  Commonly known as:  AGGRENOX     EMBEDA 20-0.8 MG Cpcr  Generic drug:  morphine-naltrexone     finasteride 5 MG tablet  Commonly known as:  PROSCAR  Take 1 tablet by mouth daily     lithium 300 MG tablet     mirtazapine 15 MG tablet  Commonly known as:  REMERON     NEUPRO 2 MG/24HR  Generic drug:  rotigotine     pregabalin 100 MG capsule  Commonly known as:  LYRICA  Take 1 capsule by mouth 2 times daily for 30 days. .     prochlorperazine 10 MG tablet  Commonly known as:  COMPAZINE  Take 1 tablet by mouth every 6 hours as needed for Nausea for 30 days. sennosides-docusate sodium 8.6-50 MG tablet  Commonly known as:  SENOKOT-S     tamsulosin 0.4 MG capsule  Commonly known as:  FLOMAX  Take 1 capsule by mouth daily     VITAMIN B-1 PO     vitamin B-12 1000 MCG tablet  Commonly known as:  CYANOCOBALAMIN     Vitamin D3 2000 units Tabs         * This list has 2 medication(s) that are the same as other medications prescribed for you. Read the directions carefully, and ask your doctor or other care provider to review them with you.             STOP taking these medications    PRISTIQ 25 MG Tb24 extended release tablet  Generic drug:  desvenlafaxine succinate     VALIUM 5 MG tablet  Generic drug:  diazepam     ZOLOFT 50 MG tablet  Generic drug:  sertraline           Where to Get Your Medications      Information about where to get these medications is not yet available    Ask your nurse or doctor about these medications  · pravastatin 20 MG tablet  · SUMAtriptan 50 MG tablet  · traZODone 50 MG tablet           TIME SPEND - 35 MINUTES TO COMPLETE THE EVALUATION, DISCHARGE SUMMARY, MEDICATION RECONCILIATION AND FOLLOW UP CARE     Alta Chino  6/6/2019  9:20 AM

## 2019-06-06 NOTE — GROUP NOTE
Group Therapy Note    Date: June 5    Group Start Time: 2045  Group End Time: 2105  Group Topic: Wrap-Up    [facility]W I    Loree Worley        Group Therapy Note    Attendees: 5    Pt attended and participated in group. Status After Intervention:  Improved    Participation Level:  Active Listener and Interactive    Participation Quality: Appropriate and Attentive      Speech:  normal      Thought Process/Content: Logical      Affective Functioning: Congruent      Mood: euthymic      Level of consciousness:  Alert, Oriented x4 and Attentive      Response to Learning: Able to verbalize current knowledge/experience and Progressing to goal      Endings: None Reported    Modes of Intervention: Support and Socialization      Discipline Responsible: Behavorial Health Tech      Signature:  Loree Worley

## 2019-06-06 NOTE — PROGRESS NOTES
Patient did not attend the 1000 skills group due having a visitor.  Electronically signed by Dallin Vasquez, [address] on 6/6/2019 at 2:19 PM

## 2019-06-07 NOTE — PROGRESS NOTES
Physical Therapy  Facility/Department: [facility] MED SURG J208/C121-89  Physical Therapy Discharge      NAME: Dmitri Brizuela    : 1955 (89 y.o.)  MRN: [id number]    Account: [de-identified]  Gender: male      Patient has been discharged from acute care hospital. DC patient from current PT program.      Electronically signed by Nathaniel Díaz PT on 19 at 10:43 AM

## 2019-06-10 ENCOUNTER — ANESTHESIA (OUTPATIENT)
Dept: POSTOP/PACU | Age: 64
End: 2019-06-10
Payer: COMMERCIAL

## 2019-06-10 ENCOUNTER — ANESTHESIA EVENT (OUTPATIENT)
Dept: POSTOP/PACU | Age: 64
End: 2019-06-10
Payer: COMMERCIAL

## 2019-06-10 ENCOUNTER — HOSPITAL ENCOUNTER (OUTPATIENT)
Dept: POSTOP/PACU | Age: 64
Discharge: OTHER INSTITUTION | End: 2019-06-10
Attending: PSYCHIATRY & NEUROLOGY | Admitting: PSYCHIATRY & NEUROLOGY
Payer: COMMERCIAL

## 2019-06-10 VITALS
SYSTOLIC BLOOD PRESSURE: 133 MMHG | RESPIRATION RATE: 17 BRPM | HEIGHT: 73 IN | OXYGEN SATURATION: 95 % | BODY MASS INDEX: 32.87 KG/M2 | DIASTOLIC BLOOD PRESSURE: 77 MMHG | HEART RATE: 86 BPM | WEIGHT: 248 LBS | TEMPERATURE: 98.2 F

## 2019-06-10 VITALS
DIASTOLIC BLOOD PRESSURE: 74 MMHG | SYSTOLIC BLOOD PRESSURE: 123 MMHG | RESPIRATION RATE: 16 BRPM | OXYGEN SATURATION: 94 %

## 2019-06-10 PROCEDURE — 6360000002 HC RX W HCPCS: Performed by: NURSE ANESTHETIST, CERTIFIED REGISTERED

## 2019-06-10 PROCEDURE — 3700000000 HC ANESTHESIA ATTENDED CARE

## 2019-06-10 PROCEDURE — 2500000003 HC RX 250 WO HCPCS: Performed by: NURSE ANESTHETIST, CERTIFIED REGISTERED

## 2019-06-10 PROCEDURE — 7100000001 HC PACU RECOVERY - ADDTL 15 MIN

## 2019-06-10 PROCEDURE — 7100000000 HC PACU RECOVERY - FIRST 15 MIN

## 2019-06-10 PROCEDURE — 2580000003 HC RX 258: Performed by: NURSE ANESTHETIST, CERTIFIED REGISTERED

## 2019-06-10 PROCEDURE — 90870 ELECTROCONVULSIVE THERAPY: CPT

## 2019-06-10 PROCEDURE — 90870 ELECTROCONVULSIVE THERAPY: CPT | Performed by: PSYCHIATRY & NEUROLOGY

## 2019-06-10 RX ORDER — SODIUM CHLORIDE 0.9 % (FLUSH) 0.9 %
10 SYRINGE (ML) INJECTION PRN
Status: CANCELLED | OUTPATIENT
Start: 2019-06-10

## 2019-06-10 RX ORDER — SODIUM CHLORIDE 9 MG/ML
INJECTION, SOLUTION INTRAVENOUS CONTINUOUS PRN
Status: DISCONTINUED | OUTPATIENT
Start: 2019-06-10 | End: 2019-06-10 | Stop reason: SDUPTHER

## 2019-06-10 RX ORDER — FENTANYL CITRATE 50 UG/ML
50 INJECTION, SOLUTION INTRAMUSCULAR; INTRAVENOUS EVERY 10 MIN PRN
Status: DISCONTINUED | OUTPATIENT
Start: 2019-06-10 | End: 2019-06-10 | Stop reason: HOSPADM

## 2019-06-10 RX ORDER — METOCLOPRAMIDE HYDROCHLORIDE 5 MG/ML
10 INJECTION INTRAMUSCULAR; INTRAVENOUS
Status: DISCONTINUED | OUTPATIENT
Start: 2019-06-10 | End: 2019-06-10 | Stop reason: HOSPADM

## 2019-06-10 RX ORDER — HYDROCODONE BITARTRATE AND ACETAMINOPHEN 5; 325 MG/1; MG/1
2 TABLET ORAL PRN
Status: DISCONTINUED | OUTPATIENT
Start: 2019-06-10 | End: 2019-06-10 | Stop reason: HOSPADM

## 2019-06-10 RX ORDER — MEPERIDINE HYDROCHLORIDE 25 MG/ML
12.5 INJECTION INTRAMUSCULAR; INTRAVENOUS; SUBCUTANEOUS EVERY 5 MIN PRN
Status: DISCONTINUED | OUTPATIENT
Start: 2019-06-10 | End: 2019-06-10 | Stop reason: HOSPADM

## 2019-06-10 RX ORDER — SODIUM CHLORIDE 0.9 % (FLUSH) 0.9 %
10 SYRINGE (ML) INJECTION EVERY 12 HOURS SCHEDULED
Status: CANCELLED | OUTPATIENT
Start: 2019-06-10

## 2019-06-10 RX ORDER — GLYCOPYRROLATE 1 MG/5 ML
SYRINGE (ML) INTRAVENOUS PRN
Status: DISCONTINUED | OUTPATIENT
Start: 2019-06-10 | End: 2019-06-10 | Stop reason: SDUPTHER

## 2019-06-10 RX ORDER — SUCCINYLCHOLINE/SOD CL,ISO/PF 100 MG/5ML
SYRINGE (ML) INTRAVENOUS PRN
Status: DISCONTINUED | OUTPATIENT
Start: 2019-06-10 | End: 2019-06-10 | Stop reason: SDUPTHER

## 2019-06-10 RX ORDER — HYDROCODONE BITARTRATE AND ACETAMINOPHEN 5; 325 MG/1; MG/1
1 TABLET ORAL PRN
Status: DISCONTINUED | OUTPATIENT
Start: 2019-06-10 | End: 2019-06-10 | Stop reason: HOSPADM

## 2019-06-10 RX ORDER — SODIUM CHLORIDE 9 MG/ML
INJECTION, SOLUTION INTRAVENOUS
Status: DISCONTINUED
Start: 2019-06-10 | End: 2019-06-10 | Stop reason: HOSPADM

## 2019-06-10 RX ORDER — DIPHENHYDRAMINE HYDROCHLORIDE 50 MG/ML
12.5 INJECTION INTRAMUSCULAR; INTRAVENOUS
Status: DISCONTINUED | OUTPATIENT
Start: 2019-06-10 | End: 2019-06-10 | Stop reason: HOSPADM

## 2019-06-10 RX ORDER — 0.9 % SODIUM CHLORIDE 0.9 %
500 INTRAVENOUS SOLUTION INTRAVENOUS ONCE
Status: CANCELLED | OUTPATIENT
Start: 2019-06-10 | End: 2019-06-10

## 2019-06-10 RX ADMIN — Medication 0.2 MG: at 12:16

## 2019-06-10 RX ADMIN — Medication 50 MG: at 12:20

## 2019-06-10 RX ADMIN — METHOHEXITAL SODIUM 80 MG: 500 INJECTION, POWDER, LYOPHILIZED, FOR SOLUTION INTRAMUSCULAR; INTRAVENOUS; RECTAL at 12:19

## 2019-06-10 RX ADMIN — SODIUM CHLORIDE: 9 INJECTION, SOLUTION INTRAVENOUS at 12:16

## 2019-06-10 ASSESSMENT — PAIN - FUNCTIONAL ASSESSMENT: PAIN_FUNCTIONAL_ASSESSMENT: 0-10

## 2019-06-10 ASSESSMENT — PAIN DESCRIPTION - DESCRIPTORS: DESCRIPTORS: STABBING

## 2019-06-10 NOTE — ANESTHESIA POSTPROCEDURE EVALUATION
Department of Anesthesiology  Postprocedure Note    Patient: Faith Ontiveros  MRN: [id number]  YOB: 1955  Date of evaluation: 6/10/2019  Time:  12:24 PM     Procedure Summary     Date:  06/10/19 [place]  [facility]    Anesthesia Start:  1216 Anesthesia Stop:  1224    Procedure:  ECT W/ ANESTHESIA Diagnosis:      Scheduled Providers:   Responsible Provider:  Payam Ogden MD    Anesthesia Type:  general ASA Status:  3          Anesthesia Type: general    Francie Phase I:      Francie Phase II:      Last vitals: Reviewed and per EMR flowsheets.        Anesthesia Post Evaluation    Patient location during evaluation: PACU  Patient participation: complete - patient participated  Level of consciousness: awake  Pain score: 0  Airway patency: patent  Nausea & Vomiting: no vomiting and no nausea  Complications: no  Cardiovascular status: hemodynamically stable  Respiratory status: nasal cannula  Hydration status: stable

## 2019-06-10 NOTE — ANESTHESIA PRE PROCEDURE
Department of Anesthesiology  Preprocedure Note       Name:  Armando Echevarria   Age:  66 y.o.  :  1955                                          MRN:  [id number]         Date:  6/10/2019      Surgeon: * No surgeons listed *    Procedure: ECT W/ ANESTHESIA    Medications prior to admission:   Prior to Admission medications    Medication Sig Start Date End Date Taking? Authorizing Provider   traZODone (DESYREL) 50 MG tablet Take 1 tablet by mouth nightly 19   Lary Paul MD   pravastatin (PRAVACHOL) 20 MG tablet Take 1 tablet by mouth nightly 19   Lary Paul MD   SUMAtriptan (IMITREX) 50 MG tablet Take 1 tablet by mouth daily as needed for Migraine (post ECT head ache on the day of ECT) 19   Lary Paul MD   Apomorphine HCl (APOKYN) 30 MG/3ML SOCT Inject 0.4 mLs into the skin every 2 hours as needed (must be given two hours apart (not to exceed a total of 5 doses a day)) Must be given 2 hours apart. Not to exceed 5 doses per day. Historical Provider, MD   dipyridamole-aspirin (AGGRENOX)  MG per extended release capsule Take 1 capsule by mouth 2 times daily    Historical Provider, MD   Menthol, Topical Analgesic, (BIOFREEZE) 4 % GEL Apply topically 3 times daily    Historical Provider, MD   Carbidopa-Levodopa ER (RYTARY) 36. MG CPCR Take 1 capsule by mouth 3 times daily    Historical Provider, MD   sennosides-docusate sodium (SENOKOT-S) 8.6-50 MG tablet Take 2 tablets by mouth nightly    Historical Provider, MD   morphine-naltrexone (EMBEDA) 20-0.8 MG CPCR Take 1 capsule by mouth 2 times daily. Liz Crawford Historical Provider, MD   zolpidem (AMBIEN) 5 MG tablet Take 5 mg by mouth nightly as needed for Sleep. Liz Crawford     Historical Provider, MD   tamsulosin (FLOMAX) 0.4 MG capsule Take 1 capsule by mouth daily 1/15/19   Amy Desai, MD   finasteride (PROSCAR) 5 MG tablet Take 1 tablet by mouth daily 1/15/19   Amy Desai MD   pregabalin (LYRICA) 100 MG capsule Take 1 capsule by mouth 2 times daily for 30 days. . 11/19/18 5/31/19  Frederick Au MD   vitamin B-12 (CYANOCOBALAMIN) 1000 MCG tablet Take 1,000 mcg by mouth daily    Historical Provider, MD   rotigotine (NEUPRO) 2 MG/24HR Place 1 patch onto the skin daily    Historical Provider, MD   lithium 300 MG tablet Take 150 mg by mouth 2 times daily     Historical Provider, MD   cyclobenzaprine (FLEXERIL) 10 MG tablet Take 10 mg by mouth 3 times daily as needed for Muscle spasms    Historical Provider, MD   acetaminophen (TYLENOL) 325 MG tablet Take 650 mg by mouth 4 times daily     Historical Provider, MD   mirtazapine (REMERON) 15 MG tablet Take 30 mg by mouth nightly     Historical Provider, MD   carbidopa-levodopa (SINEMET)  MG per tablet Take 1 tablet by mouth 5 times daily     Historical Provider, MD   Thiamine HCl (VITAMIN B-1 PO) Take 100 mg by mouth every morning     Historical Provider, MD   Cholecalciferol (VITAMIN D3) 2000 units TABS Take 2,000 Units by mouth every morning    Historical Provider, MD   prochlorperazine (COMPAZINE) 10 MG tablet Take 1 tablet by mouth every 6 hours as needed for Nausea for 30 days. 9/17/12 10/17/12  Abbey Iglesias MD       Current medications:    No current facility-administered medications for this visit. No current outpatient medications on file. 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9    Altered mental status R41.82    Urinary retention R33.9    MDD (major depressive disorder), recurrent episode ([facility] F33.9    Severe episode of recurrent major depressive disorder, without psychotic features (HCC) F33.2    Chronic pain G89.29    Generalized muscle ache M79.10    Muscle spasticity M62.838    Parkinson disease, symptomatic ([facility] Ginger Arnold       Past Medical History:        Diagnosis Date    CAD (coronary artery disease)     CVA (cerebral infarction) , 2008    x2    Hemiplegia as late effect of cerebrovascular accident ([facility] 2012    This is heavily debated. MRI shows small vessel CVD and nothing to suggest significant prior stroke to leave hemiplegia. Neurology has suggested malingering.  Hepatitis C     Hepatitis C 2011    Kidney mass     Leukopenia     MI (myocardial infarction) ([facility]     MI (myocardial infarction) ([facility] 2009    clean coronaries, no stenting - this is questionable    Osteosarcoma (HCC)     Pneumonia     Stroke ([facility]     X2       Past Surgical History:        Procedure Laterality Date    BONE RESECTION, RIB      BONY  TUMOR    BONY PELVIS SURGERY      CORONARY ANGIOPLASTY WITH STENT PLACEMENT      X2    CORONARY ANGIOPLASTY WITH STENT PLACEMENT      x2    KIDNEY CYST REMOVAL      benign    KIDNEY SURGERY      TUMOR AND STONE REMOVED       Social History:    Social History     Tobacco Use    Smoking status: Former Smoker     Last attempt to quit: 2006     Years since quittin.3    Smokeless tobacco: Never Used   Substance Use Topics    Alcohol use:  Yes     Alcohol/week: 0.6 oz     Types: 1 Cans of beer per week     Comment: occasional                                Counseling given: Not Answered      Vital Signs (Current): There were no vitals filed for this visit. BP Readings from Last 3 Encounters:   06/10/19 130/65   06/05/19 (!) 141/79   06/03/19 138/75       NPO Status:                                                                                 BMI:   Wt Readings from Last 3 Encounters:   06/10/19 248 lb (112.5 kg)   02/13/19 220 lb (99.8 kg)   01/11/19 200 lb (90.7 kg)     There is no height or weight on file to calculate BMI.    CBC:   Lab Results   Component Value Date    WBC 6.9 05/31/2019    RBC 4.71 05/31/2019    RBC 4.25 02/26/2012    HGB 13.5 05/31/2019    HCT 40.7 05/31/2019    MCV 86.4 05/31/2019    RDW 14.7 05/31/2019     05/31/2019       CMP:   Lab Results   Component Value Date     05/31/2019    K 4.1 06/04/2019     05/31/2019    CO2 27 05/31/2019    BUN 11 05/31/2019    CREATININE 0.57 05/31/2019    GFRAA >60.0 05/31/2019    LABGLOM >60.0 05/31/2019    GLUCOSE 112 05/31/2019    GLUCOSE 92 02/26/2012    PROT 8.1 05/31/2019    CALCIUM 9.5 05/31/2019    BILITOT <0.2 05/31/2019    ALKPHOS 95 05/31/2019    AST 18 05/31/2019    ALT <5 05/31/2019       POC Tests: No results for input(s): POCGLU, POCNA, POCK, POCCL, POCBUN, POCHEMO, POCHCT in the last 72 hours.     Coags:   Lab Results   Component Value Date    PROTIME 10.2 01/11/2019    PROTIME 10.8 02/20/2012    INR 1.0 01/11/2019    APTT 25.4 03/31/2013       HCG (If Applicable): No results found for: PREGTESTUR, PREGSERUM, HCG, HCGQUANT     ABGs: No results found for: PHART, PO2ART, ZTW6SBY, BRI3KNE, BEART, I6RDBOBS     Type & Screen (If Applicable):  No results found for: LABABO, [address]    Anesthesia Evaluation  Patient summary reviewed and Nursing notes reviewed  Airway: Mallampati: II  TM distance: >3 FB   Neck ROM: full  Mouth opening: > = 3 FB Dental:    (+) edentulous      Pulmonary: breath sounds clear to auscultation      (-) pneumonia Cardiovascular:    (+) past MI:, CAD:,       ECG reviewed  Rhythm: regular                      Neuro/Psych:   (+) CVA:, neuromuscular disease: Parkinson's disease, psychiatric history:depression/anxiety             GI/Hepatic/Renal:   (+) hepatitis: C, liver disease:,           Endo/Other: Negative Endo/Other ROS                    Abdominal:           Vascular:                                          Anesthesia Plan      general     ASA 3     (By Michael Royal)  Induction: intravenous. Anesthetic plan and risks discussed with patient. Plan discussed with CRNA.     Attending anesthesiologist reviewed and agrees with Pre Eval content              Breanne Milligan MD   6/10/2019

## 2019-06-12 ENCOUNTER — HOSPITAL ENCOUNTER (OUTPATIENT)
Dept: POSTOP/PACU | Age: 64
Discharge: HOME | End: 2019-06-12
Attending: PSYCHIATRY & NEUROLOGY | Admitting: PSYCHIATRY & NEUROLOGY
Payer: COMMERCIAL

## 2019-06-12 ENCOUNTER — ANESTHESIA EVENT (OUTPATIENT)
Dept: POSTOP/PACU | Age: 64
End: 2019-06-12
Payer: COMMERCIAL

## 2019-06-12 ENCOUNTER — ANESTHESIA (OUTPATIENT)
Dept: POSTOP/PACU | Age: 64
End: 2019-06-12
Payer: COMMERCIAL

## 2019-06-12 VITALS
WEIGHT: 248 LBS | TEMPERATURE: 99 F | OXYGEN SATURATION: 96 % | DIASTOLIC BLOOD PRESSURE: 92 MMHG | HEIGHT: 73 IN | BODY MASS INDEX: 32.87 KG/M2 | SYSTOLIC BLOOD PRESSURE: 149 MMHG | HEART RATE: 100 BPM | RESPIRATION RATE: 9 BRPM

## 2019-06-12 VITALS
OXYGEN SATURATION: 96 % | DIASTOLIC BLOOD PRESSURE: 115 MMHG | RESPIRATION RATE: 17 BRPM | SYSTOLIC BLOOD PRESSURE: 196 MMHG

## 2019-06-12 PROCEDURE — 6360000002 HC RX W HCPCS: Performed by: NURSE ANESTHETIST, CERTIFIED REGISTERED

## 2019-06-12 PROCEDURE — 3700000000 HC ANESTHESIA ATTENDED CARE

## 2019-06-12 PROCEDURE — 7100000001 HC PACU RECOVERY - ADDTL 15 MIN

## 2019-06-12 PROCEDURE — 90870 ELECTROCONVULSIVE THERAPY: CPT | Performed by: PSYCHIATRY & NEUROLOGY

## 2019-06-12 PROCEDURE — 2580000003 HC RX 258

## 2019-06-12 PROCEDURE — 2500000003 HC RX 250 WO HCPCS: Performed by: NURSE ANESTHETIST, CERTIFIED REGISTERED

## 2019-06-12 PROCEDURE — 7100000000 HC PACU RECOVERY - FIRST 15 MIN

## 2019-06-12 RX ORDER — 0.9 % SODIUM CHLORIDE 0.9 %
500 INTRAVENOUS SOLUTION INTRAVENOUS ONCE
Status: CANCELLED | OUTPATIENT
Start: 2019-06-12 | End: 2019-06-12

## 2019-06-12 RX ORDER — METOCLOPRAMIDE HYDROCHLORIDE 5 MG/ML
10 INJECTION INTRAMUSCULAR; INTRAVENOUS
Status: DISCONTINUED | OUTPATIENT
Start: 2019-06-12 | End: 2019-06-12 | Stop reason: HOSPADM

## 2019-06-12 RX ORDER — SODIUM CHLORIDE 0.9 % (FLUSH) 0.9 %
10 SYRINGE (ML) INJECTION PRN
Status: CANCELLED | OUTPATIENT
Start: 2019-06-12

## 2019-06-12 RX ORDER — SUCCINYLCHOLINE/SOD CL,ISO/PF 100 MG/5ML
SYRINGE (ML) INTRAVENOUS PRN
Status: DISCONTINUED | OUTPATIENT
Start: 2019-06-12 | End: 2019-06-12 | Stop reason: SDUPTHER

## 2019-06-12 RX ORDER — SODIUM CHLORIDE 9 MG/ML
INJECTION, SOLUTION INTRAVENOUS
Status: COMPLETED
Start: 2019-06-12 | End: 2019-06-12

## 2019-06-12 RX ORDER — SODIUM CHLORIDE 0.9 % (FLUSH) 0.9 %
10 SYRINGE (ML) INJECTION EVERY 12 HOURS SCHEDULED
Status: CANCELLED | OUTPATIENT
Start: 2019-06-12

## 2019-06-12 RX ORDER — GLYCOPYRROLATE 1 MG/5 ML
SYRINGE (ML) INTRAVENOUS PRN
Status: DISCONTINUED | OUTPATIENT
Start: 2019-06-12 | End: 2019-06-12 | Stop reason: SDUPTHER

## 2019-06-12 RX ORDER — FENTANYL CITRATE 50 UG/ML
50 INJECTION, SOLUTION INTRAMUSCULAR; INTRAVENOUS EVERY 10 MIN PRN
Status: DISCONTINUED | OUTPATIENT
Start: 2019-06-12 | End: 2019-06-12 | Stop reason: HOSPADM

## 2019-06-12 RX ORDER — HYDROCODONE BITARTRATE AND ACETAMINOPHEN 5; 325 MG/1; MG/1
1 TABLET ORAL PRN
Status: DISCONTINUED | OUTPATIENT
Start: 2019-06-12 | End: 2019-06-12 | Stop reason: HOSPADM

## 2019-06-12 RX ORDER — SODIUM CHLORIDE 9 MG/ML
INJECTION, SOLUTION INTRAVENOUS CONTINUOUS
Status: DISCONTINUED | OUTPATIENT
Start: 2019-06-12 | End: 2019-06-12 | Stop reason: HOSPADM

## 2019-06-12 RX ORDER — IBUPROFEN 600 MG/1
600 TABLET ORAL 3 TIMES DAILY PRN
Qty: 90 TABLET | Refills: 0 | Status: SHIPPED | OUTPATIENT
Start: 2019-06-12 | End: 2019-08-16

## 2019-06-12 RX ORDER — DIPHENHYDRAMINE HYDROCHLORIDE 50 MG/ML
12.5 INJECTION INTRAMUSCULAR; INTRAVENOUS
Status: DISCONTINUED | OUTPATIENT
Start: 2019-06-12 | End: 2019-06-12 | Stop reason: HOSPADM

## 2019-06-12 RX ORDER — HYDROCODONE BITARTRATE AND ACETAMINOPHEN 5; 325 MG/1; MG/1
2 TABLET ORAL PRN
Status: DISCONTINUED | OUTPATIENT
Start: 2019-06-12 | End: 2019-06-12 | Stop reason: HOSPADM

## 2019-06-12 RX ORDER — MEPERIDINE HYDROCHLORIDE 25 MG/ML
12.5 INJECTION INTRAMUSCULAR; INTRAVENOUS; SUBCUTANEOUS EVERY 5 MIN PRN
Status: DISCONTINUED | OUTPATIENT
Start: 2019-06-12 | End: 2019-06-12 | Stop reason: HOSPADM

## 2019-06-12 RX ADMIN — METHOHEXITAL SODIUM 80 MG: 500 INJECTION, POWDER, LYOPHILIZED, FOR SOLUTION INTRAMUSCULAR; INTRAVENOUS; RECTAL at 12:02

## 2019-06-12 RX ADMIN — Medication 0.2 MG: at 12:00

## 2019-06-12 RX ADMIN — Medication 50 MG: at 12:03

## 2019-06-12 RX ADMIN — SODIUM CHLORIDE 1000 ML: 9 INJECTION, SOLUTION INTRAVENOUS at 11:32

## 2019-06-12 NOTE — ANESTHESIA PRE PROCEDURE
Department of Anesthesiology  Preprocedure Note       Name:  Tiffani Witt   Age:  66 y.o.  :  1955                                          MRN:  [id number]         Date:  2019      Surgeon: * No surgeons listed *    Procedure: ECT W/ ANESTHESIA    Medications prior to admission:   Prior to Admission medications    Medication Sig Start Date End Date Taking? Authorizing Provider   traZODone (DESYREL) 50 MG tablet Take 1 tablet by mouth nightly 19   Jewels Pond MD   pravastatin (PRAVACHOL) 20 MG tablet Take 1 tablet by mouth nightly 19   Jewels Pond MD   SUMAtriptan (IMITREX) 50 MG tablet Take 1 tablet by mouth daily as needed for Migraine (post ECT head ache on the day of ECT) 19   Jewels Pond MD   Apomorphine HCl (APOKYN) 30 MG/3ML SOCT Inject 0.4 mLs into the skin every 2 hours as needed (must be given two hours apart (not to exceed a total of 5 doses a day)) Must be given 2 hours apart. Not to exceed 5 doses per day. Historical Provider, MD   dipyridamole-aspirin (AGGRENOX)  MG per extended release capsule Take 1 capsule by mouth 2 times daily    Historical Provider, MD   Menthol, Topical Analgesic, (BIOFREEZE) 4 % GEL Apply topically 3 times daily    Historical Provider, MD   Carbidopa-Levodopa ER (RYTARY) 36. MG CPCR Take 1 capsule by mouth 3 times daily    Historical Provider, MD   sennosides-docusate sodium (SENOKOT-S) 8.6-50 MG tablet Take 2 tablets by mouth nightly    Historical Provider, MD   morphine-naltrexone (EMBEDA) 20-0.8 MG CPCR Take 1 capsule by mouth 2 times daily. Prerna Mercer Historical Provider, MD   zolpidem (AMBIEN) 5 MG tablet Take 5 mg by mouth nightly as needed for Sleep. Prerna Mercer     Historical Provider, MD   tamsulosin (FLOMAX) 0.4 MG capsule Take 1 capsule by mouth daily 1/15/19   Quinton Burnett MD   finasteride (PROSCAR) 5 MG tablet Take 1 tablet by mouth daily 1/15/19   Quinton Burnett MD   pregabalin (LYRICA) 100 MG capsule Take 1 capsule by mouth 2 times daily for 30 days. . 11/19/18 5/31/19  Rigoberto Bonilla MD   vitamin B-12 (CYANOCOBALAMIN) 1000 MCG tablet Take 1,000 mcg by mouth daily    Historical Provider, MD   rotigotine (NEUPRO) 2 MG/24HR Place 1 patch onto the skin daily    Historical Provider, MD   lithium 300 MG tablet Take 150 mg by mouth 2 times daily     Historical Provider, MD   cyclobenzaprine (FLEXERIL) 10 MG tablet Take 10 mg by mouth 3 times daily as needed for Muscle spasms    Historical Provider, MD   acetaminophen (TYLENOL) 325 MG tablet Take 650 mg by mouth 4 times daily     Historical Provider, MD   mirtazapine (REMERON) 15 MG tablet Take 30 mg by mouth nightly     Historical Provider, MD   carbidopa-levodopa (SINEMET)  MG per tablet Take 1 tablet by mouth 5 times daily     Historical Provider, MD   Thiamine HCl (VITAMIN B-1 PO) Take 100 mg by mouth every morning     Historical Provider, MD   Cholecalciferol (VITAMIN D3) 2000 units TABS Take 2,000 Units by mouth every morning    Historical Provider, MD   prochlorperazine (COMPAZINE) 10 MG tablet Take 1 tablet by mouth every 6 hours as needed for Nausea for 30 days. 9/17/12 10/17/12  Nina Murphy MD       Current medications:    No current facility-administered medications for this visit. No current outpatient medications on file. Facility-Administered Medications Ordered in Other Visits   Medication Dose Route Frequency Provider Last Rate Last Dose    0.9 % sodium chloride infusion   Intravenous Continuous Audrey Rice MD           Allergies:     Allergies   Allergen Reactions    Pcn [Penicillins] Hives    Penicillins     Ppd [Tuberculin Purified Protein Derivative]     Zofran [Ondansetron Hcl]        Problem List:    Patient Active Problem List   Diagnosis Code    [facility]osarcoma ([facility] C41.9    Hepatitis C B19.20    Cerebral infarction ([facility] I63.9    MI (myocardial infarction) ([facility] I21.9    Left hemiplegia (HCC) G81.94    Hep C w/o coma, chronic ([facility] B18.2  Stroke [facility]) I63.9    MI (myocardial infarction) ([address] I21.9    Leukopenia D72.819    Hepatitis C B19.20    CAD (coronary artery disease) I25.10    Pneumonia J18.9    Kidney mass N28.89    Pancytopenia (HCC) D61.818    HCAP (healthcare-associated pneumonia) J18.9    Altered mental status R41.82    Urinary retention R33.9    MDD (major depressive disorder), recurrent episode (HCC) F33.9    Severe episode of recurrent major depressive disorder, without psychotic features (HCC) F33.2    Chronic pain G89.29    Generalized muscle ache M79.10    Muscle spasticity M62.838    Parkinson disease, symptomatic ([address] Mayelin Barrera       Past Medical History:        Diagnosis Date    CAD (coronary artery disease)     CVA (cerebral infarction) , 2008    x2    Hemiplegia as late effect of cerebrovascular accident ([address] 2012    This is heavily debated. MRI shows small vessel CVD and nothing to suggest significant prior stroke to leave hemiplegia. Neurology has suggested malingering. 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HCC)     Pneumonia     Stroke ([address]     X2       Past Surgical History:        Procedure Laterality Date    BONE RESECTION, RIB      BONY  TUMOR    BONY PELVIS SURGERY  1982    CORONARY ANGIOPLASTY WITH STENT PLACEMENT      X2    CORONARY ANGIOPLASTY WITH STENT PLACEMENT      x2    KIDNEY CYST REMOVAL      benign    KIDNEY SURGERY      TUMOR AND STONE REMOVED       Social History:    Social History     Tobacco Use    Smoking status: Former Smoker     Last attempt to quit: 2006     Years since quittin.3    Smokeless tobacco: Never Used   Substance Use Topics    Alcohol use:  Yes     Alcohol/week: 0.6 oz     Types: 1 Cans of beer per week     Comment: occasional Counseling given: Not Answered      Vital Signs (Current): There were no vitals filed for this visit. BP Readings from Last 3 Encounters:   06/12/19 124/75   06/10/19 133/77   06/10/19 123/74       NPO Status:                                                                                 BMI:   Wt Readings from Last 3 Encounters:   06/12/19 248 lb (112.5 kg)   06/10/19 248 lb (112.5 kg)   02/13/19 220 lb (99.8 kg)     There is no height or weight on file to calculate BMI.    CBC:   Lab Results   Component Value Date    WBC 6.9 05/31/2019    RBC 4.71 05/31/2019    RBC 4.25 02/26/2012    HGB 13.5 05/31/2019    HCT 40.7 05/31/2019    MCV 86.4 05/31/2019    RDW 14.7 05/31/2019     05/31/2019       CMP:   Lab Results   Component Value Date     05/31/2019    K 4.1 06/04/2019     05/31/2019    CO2 27 05/31/2019    BUN 11 05/31/2019    CREATININE 0.57 05/31/2019    GFRAA >60.0 05/31/2019    LABGLOM >60.0 05/31/2019    GLUCOSE 112 05/31/2019    GLUCOSE 92 02/26/2012    PROT 8.1 05/31/2019    CALCIUM 9.5 05/31/2019    BILITOT <0.2 05/31/2019    ALKPHOS 95 05/31/2019    AST 18 05/31/2019    ALT <5 05/31/2019       POC Tests: No results for input(s): POCGLU, POCNA, POCK, POCCL, POCBUN, POCHEMO, POCHCT in the last 72 hours.     Coags:   Lab Results   Component Value Date    PROTIME 10.2 01/11/2019    PROTIME 10.8 02/20/2012    INR 1.0 01/11/2019    APTT 25.4 03/31/2013       HCG (If Applicable): No results found for: PREGTESTUR, PREGSERUM, HCG, HCGQUANT     ABGs: No results found for: PHART, PO2ART, WZQ1GJX, GTN6KBK, BEART, R7QJVEKV     Type & Screen (If Applicable):  No results found for: LABABO, [address]    Anesthesia Evaluation  Patient summary reviewed and Nursing notes reviewed  Airway: Mallampati: II  TM distance: >3 FB   Neck ROM: full  Mouth opening: > = 3 FB Dental:    (+) edentulous      Pulmonary: breath sounds clear to auscultation      (-) pneumonia                           Cardiovascular:    (+) past MI:, CAD:,       ECG reviewed  Rhythm: regular                      Neuro/Psych:   (+) CVA:, neuromuscular disease: Parkinson's disease, psychiatric history:depression/anxiety             GI/Hepatic/Renal:   (+) hepatitis: C, liver disease:,           Endo/Other: Negative Endo/Other ROS                    Abdominal:           Vascular:                                          Anesthesia Plan      general     ASA 3     (By Meredith Brock)  Induction: intravenous. Anesthetic plan and risks discussed with patient. Plan discussed with CRNA.     Attending anesthesiologist reviewed and agrees with Pre Eval content              Mahesh Reich MD   6/12/2019

## 2019-06-12 NOTE — ANESTHESIA POSTPROCEDURE EVALUATION
Department of Anesthesiology  Postprocedure Note    Patient: Markell Asencio  MRN: [id number]  YOB: 1955  Date of evaluation: 6/12/2019  Time:  12:08 PM     Procedure Summary     Date:  06/12/19 Room / Location:  [facility] PACU    Anesthesia Start:  1200 Anesthesia Stop:  [id number]    Procedure:  ECT W/ ANESTHESIA Diagnosis:      Scheduled Providers:   Responsible Provider:  Alvin Vegas MD    Anesthesia Type:  general ASA Status:  3          Anesthesia Type: general    Francie Phase I:      Francie Phase II:      Last vitals: Reviewed and per EMR flowsheets.        Anesthesia Post Evaluation    Patient location during evaluation: PACU  Patient participation: complete - patient participated  Level of consciousness: awake and alert  Pain score: 0  Airway patency: patent  Nausea & Vomiting: no nausea and no vomiting  Complications: no  Cardiovascular status: blood pressure returned to baseline and hemodynamically stable  Respiratory status: acceptable  Hydration status: euvolemic

## 2019-06-14 ENCOUNTER — ANESTHESIA (OUTPATIENT)
Dept: POSTOP/PACU | Age: 64
End: 2019-06-14
Payer: COMMERCIAL

## 2019-06-14 ENCOUNTER — ANESTHESIA EVENT (OUTPATIENT)
Dept: POSTOP/PACU | Age: 64
End: 2019-06-14
Payer: COMMERCIAL

## 2019-06-14 ENCOUNTER — HOSPITAL ENCOUNTER (OUTPATIENT)
Dept: POSTOP/PACU | Age: 64
Discharge: HOME | End: 2019-06-14
Attending: PSYCHIATRY & NEUROLOGY | Admitting: PSYCHIATRY & NEUROLOGY
Payer: COMMERCIAL

## 2019-06-14 VITALS
OXYGEN SATURATION: 97 % | RESPIRATION RATE: 11 BRPM | SYSTOLIC BLOOD PRESSURE: 140 MMHG | HEART RATE: 96 BPM | TEMPERATURE: 98.3 F | DIASTOLIC BLOOD PRESSURE: 82 MMHG

## 2019-06-14 VITALS
DIASTOLIC BLOOD PRESSURE: 135 MMHG | SYSTOLIC BLOOD PRESSURE: 221 MMHG | OXYGEN SATURATION: 92 % | RESPIRATION RATE: 25 BRPM

## 2019-06-14 PROCEDURE — 90870 ELECTROCONVULSIVE THERAPY: CPT

## 2019-06-14 PROCEDURE — 6360000002 HC RX W HCPCS: Performed by: ANESTHESIOLOGY

## 2019-06-14 PROCEDURE — 90870 ELECTROCONVULSIVE THERAPY: CPT | Performed by: PSYCHIATRY & NEUROLOGY

## 2019-06-14 PROCEDURE — 3700000000 HC ANESTHESIA ATTENDED CARE

## 2019-06-14 PROCEDURE — 7100000001 HC PACU RECOVERY - ADDTL 15 MIN

## 2019-06-14 PROCEDURE — 7100000000 HC PACU RECOVERY - FIRST 15 MIN

## 2019-06-14 PROCEDURE — 2500000003 HC RX 250 WO HCPCS: Performed by: ANESTHESIOLOGY

## 2019-06-14 RX ORDER — MEPERIDINE HYDROCHLORIDE 25 MG/ML
12.5 INJECTION INTRAMUSCULAR; INTRAVENOUS; SUBCUTANEOUS EVERY 5 MIN PRN
Status: DISCONTINUED | OUTPATIENT
Start: 2019-06-14 | End: 2019-06-14 | Stop reason: HOSPADM

## 2019-06-14 RX ORDER — HYDROCODONE BITARTRATE AND ACETAMINOPHEN 5; 325 MG/1; MG/1
2 TABLET ORAL PRN
Status: DISCONTINUED | OUTPATIENT
Start: 2019-06-14 | End: 2019-06-14 | Stop reason: HOSPADM

## 2019-06-14 RX ORDER — SODIUM CHLORIDE 0.9 % (FLUSH) 0.9 %
10 SYRINGE (ML) INJECTION EVERY 12 HOURS SCHEDULED
Status: CANCELLED | OUTPATIENT
Start: 2019-06-14

## 2019-06-14 RX ORDER — BUSPIRONE HYDROCHLORIDE 15 MG/1
15 TABLET ORAL DAILY
COMMUNITY
Start: 2019-02-05 | End: 2019-08-16

## 2019-06-14 RX ORDER — SUCCINYLCHOLINE/SOD CL,ISO/PF 100 MG/5ML
SYRINGE (ML) INTRAVENOUS PRN
Status: DISCONTINUED | OUTPATIENT
Start: 2019-06-14 | End: 2019-06-14 | Stop reason: SDUPTHER

## 2019-06-14 RX ORDER — GLYCOPYRROLATE 1 MG/5 ML
SYRINGE (ML) INTRAVENOUS PRN
Status: DISCONTINUED | OUTPATIENT
Start: 2019-06-14 | End: 2019-06-14 | Stop reason: SDUPTHER

## 2019-06-14 RX ORDER — HYDROCODONE BITARTRATE AND ACETAMINOPHEN 5; 325 MG/1; MG/1
1 TABLET ORAL PRN
Status: DISCONTINUED | OUTPATIENT
Start: 2019-06-14 | End: 2019-06-14 | Stop reason: HOSPADM

## 2019-06-14 RX ORDER — 0.9 % SODIUM CHLORIDE 0.9 %
500 INTRAVENOUS SOLUTION INTRAVENOUS ONCE
Status: CANCELLED | OUTPATIENT
Start: 2019-06-14 | End: 2019-06-14

## 2019-06-14 RX ORDER — FENTANYL CITRATE 50 UG/ML
50 INJECTION, SOLUTION INTRAMUSCULAR; INTRAVENOUS EVERY 10 MIN PRN
Status: DISCONTINUED | OUTPATIENT
Start: 2019-06-14 | End: 2019-06-14 | Stop reason: HOSPADM

## 2019-06-14 RX ORDER — MIRTAZAPINE 30 MG/1
45 TABLET, FILM COATED ORAL NIGHTLY
COMMUNITY
Start: 2019-06-06

## 2019-06-14 RX ORDER — DIAZEPAM 5 MG/1
5 TABLET ORAL NIGHTLY
COMMUNITY
Start: 2019-02-20 | End: 2019-08-16

## 2019-06-14 RX ORDER — DIPHENHYDRAMINE HYDROCHLORIDE 50 MG/ML
12.5 INJECTION INTRAMUSCULAR; INTRAVENOUS
Status: DISCONTINUED | OUTPATIENT
Start: 2019-06-14 | End: 2019-06-14 | Stop reason: HOSPADM

## 2019-06-14 RX ORDER — SODIUM CHLORIDE 0.9 % (FLUSH) 0.9 %
10 SYRINGE (ML) INJECTION PRN
Status: CANCELLED | OUTPATIENT
Start: 2019-06-14

## 2019-06-14 RX ORDER — SERTRALINE HYDROCHLORIDE 100 MG/1
100 TABLET, FILM COATED ORAL DAILY
COMMUNITY
Start: 2019-05-27 | End: 2019-08-16

## 2019-06-14 RX ORDER — METOCLOPRAMIDE HYDROCHLORIDE 5 MG/ML
10 INJECTION INTRAMUSCULAR; INTRAVENOUS
Status: DISCONTINUED | OUTPATIENT
Start: 2019-06-14 | End: 2019-06-14 | Stop reason: HOSPADM

## 2019-06-14 RX ADMIN — METHOHEXITAL SODIUM 100 MG: 500 INJECTION, POWDER, LYOPHILIZED, FOR SOLUTION INTRAMUSCULAR; INTRAVENOUS; RECTAL at 12:35

## 2019-06-14 RX ADMIN — Medication 50 MG: at 12:35

## 2019-06-14 RX ADMIN — Medication 0.3 MG: at 12:30

## 2019-06-14 NOTE — ANESTHESIA PRE PROCEDURE
tamsulosin (FLOMAX) 0.4 MG capsule Take 1 capsule by mouth daily 1/15/19  Yes Janak Moses MD   finasteride (PROSCAR) 5 MG tablet Take 1 tablet by mouth daily 1/15/19  Yes Janak Moses MD   pregabalin (LYRICA) 100 MG capsule Take 1 capsule by mouth 2 times daily for 30 days. . 11/19/18 6/14/19 Yes David Perry MD   vitamin B-12 (CYANOCOBALAMIN) 1000 MCG tablet Take 1,000 mcg by mouth daily   Yes Historical Provider, MD   rotigotine (NEUPRO) 2 MG/24HR Place 1 patch onto the skin daily   Yes Historical Provider, MD   lithium 300 MG tablet Take 150 mg by mouth 2 times daily    Yes Historical Provider, MD   cyclobenzaprine (FLEXERIL) 10 MG tablet Take 10 mg by mouth 3 times daily as needed for Muscle spasms   Yes Historical Provider, MD   acetaminophen (TYLENOL) 325 MG tablet Take 650 mg by mouth 4 times daily    Yes Historical Provider, MD   mirtazapine (REMERON) 15 MG tablet Take 30 mg by mouth nightly    Yes Historical Provider, MD   carbidopa-levodopa (SINEMET)  MG per tablet Take 1 tablet by mouth 5 times daily    Yes Historical Provider, MD   Thiamine HCl (VITAMIN B-1 PO) Take 100 mg by mouth every morning    Yes Historical Provider, MD   Cholecalciferol (VITAMIN D3) 2000 units TABS Take 2,000 Units by mouth every morning   Yes Historical Provider, MD   prochlorperazine (COMPAZINE) 10 MG tablet Take 1 tablet by mouth every 6 hours as needed for Nausea for 30 days. 9/17/12 6/14/19 Yes Joann Pulido MD   mirtazapine (REMERON) 30 MG tablet Take 30 mg by mouth nightly as needed 6/6/19   Historical Provider, MD   LYRICA 50 MG capsule Take 50 mg by mouth as needed. 4/3/19   Historical Provider, MD   Menthol, Topical Analgesic, (BIOFREEZE) 4 % GEL Apply topically 3 times daily    Historical Provider, MD       Current medications:    No current facility-administered medications for this encounter. 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    Hepatitis C B19.20    Cerebral infarction ([address] I63.9    MI (myocardial infarction) ([address] I21.9    Left hemiplegia ([address] G81.94    Hep C w/o coma, chronic (HCC) B18.2    Stroke ([facility] [address] I63.9    MI (myocardial infarction) ([address] I21.9    Leukopenia D72.819    Hepatitis C B19.20    CAD (coronary artery disease) I25.10    Pneumonia J18.9    Kidney mass N28.89    Pancytopenia ([address] D61.818    HCAP (healthcare-associated pneumonia) J18.9    Altered mental status R41.82    Urinary retention R33.9    MDD (major depressive disorder), recurrent episode ([facility] [address] F33.9    Severe episode of recurrent major depressive disorder, without psychotic features ([address] F33.2    Chronic pain G89.29    Generalized muscle ache M79.10    Muscle spasticity M62.838    Parkinson disease, symptomatic ([address] Mayelin Barrera       Past Medical History:        Diagnosis Date    CAD (coronary artery disease)     CVA (cerebral infarction) 2007, 2008    x2    Hemiplegia as late effect of cerebrovascular accident ([address] 2012    This is heavily debated. MRI shows small vessel CVD and nothing to suggest significant prior stroke to leave hemiplegia. Neurology has suggested malingering.     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HCC)     Pneumonia     Stroke ([address]     X2       Past Surgical History:        Procedure Laterality Date    BONE RESECTION, RIB      BONY  TUMOR    BONY PELVIS SURGERY  1982    CORONARY ANGIOPLASTY WITH STENT PLACEMENT      X2    CORONARY ANGIOPLASTY WITH STENT PLACEMENT      x2    KIDNEY CYST REMOVAL      benign    KIDNEY SURGERY      TUMOR AND STONE REMOVED Social History:    Social History     Tobacco Use    Smoking status: Former Smoker     Last attempt to quit: 2006     Years since quittin.3    Smokeless tobacco: Never Used   Substance Use Topics    Alcohol use: Yes     Alcohol/week: 0.6 oz     Types: 1 Cans of beer per week     Comment: occasional                                Counseling given: Not Answered      Vital Signs (Current):   Vitals:    19 1115   BP: (!) 143/77   Pulse: 83   Resp: 20   Temp: 98.1 °F (36.7 °C)   TempSrc: Temporal   SpO2: 97%                                              BP Readings from Last 3 Encounters:   19 (!) 143/77   19 (!) 149/92   19 (!) 196/115       NPO Status:                                                                                 BMI:   Wt Readings from Last 3 Encounters:   19 248 lb (112.5 kg)   06/10/19 248 lb (112.5 kg)   19 220 lb (99.8 kg)     There is no height or weight on file to calculate BMI.    CBC:   Lab Results   Component Value Date    WBC 6.9 2019    RBC 4.71 2019    RBC 4.25 2012    HGB 13.5 2019    HCT 40.7 2019    MCV 86.4 2019    RDW 14.7 2019     2019       CMP:   Lab Results   Component Value Date     2019    K 4.1 2019     2019    CO2 27 2019    BUN 11 2019    CREATININE 0.57 2019    GFRAA >60.0 2019    LABGLOM >60.0 2019    GLUCOSE 112 2019    GLUCOSE 92 2012    PROT 8.1 2019    CALCIUM 9.5 2019    BILITOT <0.2 2019    ALKPHOS 95 2019    AST 18 2019    ALT <5 2019       POC Tests: No results for input(s): POCGLU, POCNA, POCK, POCCL, POCBUN, POCHEMO, POCHCT in the last 72 hours.     Coags:   Lab Results   Component Value Date    PROTIME 10.2 2019    PROTIME 10.8 2012    INR 1.0 2019    APTT 25.4 2013       HCG (If Applicable): No results found for: PREGTESTUR, PREGSERUM, HCG, HCGQUANT     ABGs: No results found for: PHART, PO2ART, KTC9HXT, AWZ6SQH, BEART, W5IRQINW     Type & Screen (If Applicable):  No results found for: LABABO, [address]    Anesthesia Evaluation  Patient summary reviewed and Nursing notes reviewed  Airway: Mallampati: II  TM distance: >3 FB   Neck ROM: full  Mouth opening: > = 3 FB Dental:    (+) edentulous      Pulmonary:   (+) pneumonia:                             Cardiovascular:    (+) past MI:, CAD:,                   Neuro/Psych:   (+) CVA:, psychiatric history:            GI/Hepatic/Renal:   (+) hepatitis:, liver disease:,           Endo/Other:                     Abdominal:           Vascular:                                      Anesthesia Plan      general     ASA 3       Induction: intravenous. Anesthetic plan and risks discussed with patient. Plan discussed with CRNA.     Attending anesthesiologist reviewed and agrees with Raji Peña MD   6/14/2019

## 2019-06-14 NOTE — ADDENDUM NOTE
Addendum  created 06/14/19 1449 by PAMELA Rea CRNA    Attestation recorded in [facility] filed, Intraprocedure Staff edited

## 2019-06-14 NOTE — OP NOTE
Department of Psychiatry  Electroconvulsive Therapy Treatment Note        6/14/2019    PURPOSE FOR ECT:  Therapeutic NUMBER: 5    DIAGNOSIS:   Active Problems:    MDD (major depressive disorder), recurrent episode ([facility] [address]  Resolved Problems:    * No resolved hospital problems. *      MEDICATIONS:    Current Facility-Administered Medications:     fentaNYL (SUBLIMAZE) injection 50 mcg, 50 mcg, Intravenous, Q10 Min PRN, Nabil Portillo MD    HYDROmorphone (DILAUDID) injection 0.5 mg, 0.5 mg, Intravenous, Q10 Min PRN, Nabil Portillo MD    HYDROcodone-acetaminophen (NORCO) 5-325 MG per tablet 1 tablet, 1 tablet, Oral, PRN **OR** HYDROcodone-acetaminophen (NORCO) 5-325 MG per tablet 2 tablet, 2 tablet, Oral, PRN, Nabil Portillo MD    diphenhydrAMINE (BENADRYL) injection 12.5 mg, 12.5 mg, Intravenous, Once PRN, Nabil Portillo MD    metoclopramide (REGLAN) injection 10 mg, 10 mg, Intravenous, Once PRN, Nabil Portillo MD    meperidine (DEMEROL) injection 12.5 mg, 12.5 mg, Intravenous, Q5 Min PRN, Nabil Portillo MD    Facility-Administered Medications Ordered in Other Encounters:     methohexital (BREVITAL SODIUM) injection, , , PRN, Nabil Portillo MD, 100 mg at 06/14/19 1235    glycopyrrolate (ROBINUL) injection, , , PRN, Nabil Portillo MD, 0.3 mg at 06/14/19 1230    succinylcholine chloride (ANECTINE) injection, , , PRN, Nabil Portillo MD, 50 mg at 06/14/19 1235    CHANGE IN PSYCHIATRY STATUS SINCE LAST ECT:   Mild impovement    INFORMED CONSENT OBTAINED : YES    PRE ECT MEDICATION ADMINISTERED:   yes    NPO STATUS: Confirmed    ELECTRODE PLACEMENT : RUL    TIME OUT :  TEAM CONFIRMS THE CORRECT PATIENT, CORRECT PROCEDURE AND CORRECT SITE.     DEVICE PARAMETERS:   Charge:[id number]  PW- 0.3  Freq- 70  Duration- 8  EEG- 23  Motor- 20        VITALS:   Vitals:    06/14/19 1115   BP: (!) 143/77   Pulse: 83   Resp: 20   Temp: 98.1 °F (36.7 °C)   SpO2: 95%         COMPLICATIONS: no      RECOMMENDATIONS FOR NEXT TREATMENT:

## 2019-06-14 NOTE — H&P
Update History & Physical    The patient's History and Physical of 6 / 1 / 19 was reviewed with the patient and there were no significant changes. I examined the patient and there were no significant changes from the previous History and Physical.    Vitals:    06/14/19 1115   BP: (!) 143/77   Pulse: 83   Resp: 20   Temp: 98.1 °F (36.7 °C)   SpO2: 97%     Active Problems:    MDD (major depressive disorder), recurrent episode ([address]  Resolved Problems:    * No resolved hospital problems. *        Plan: The risk, benefits, expected outcome, and alternative to the recommended procedure have been discussed with the patient. Patient understands and wants to proceed with the procedure.     Electronically signed by uSmanth Almanzar MD on 6/14/19

## 2019-06-14 NOTE — ANESTHESIA POSTPROCEDURE EVALUATION
Department of Anesthesiology  Postprocedure Note    Patient: Florentin Rojo  MRN: [id number]  YOB: 1955  Date of evaluation: 6/14/2019  Time:  12:44 PM     Procedure Summary     Date:  06/14/19 Room / Location:  [facility] PACU    Anesthesia Start:  1232 Anesthesia Stop:  1244    Procedure:  ECT W/ ANESTHESIA Diagnosis:      Scheduled Providers:   Responsible Provider:  Candance Norfolk, MD    Anesthesia Type:  general ASA Status:  3          Anesthesia Type: general    Francie Phase I:      Francie Phase II:      Last vitals: Reviewed and per EMR flowsheets.        Anesthesia Post Evaluation    Patient location during evaluation: PACU  Patient participation: complete - patient participated  Level of consciousness: awake and alert  Airway patency: patent  Nausea & Vomiting: no nausea and no vomiting  Complications: no  Cardiovascular status: blood pressure returned to baseline and hemodynamically stable  Respiratory status: acceptable  Hydration status: euvolemic

## 2019-06-19 ENCOUNTER — ANESTHESIA (OUTPATIENT)
Dept: POSTOP/PACU | Age: 64
End: 2019-06-19

## 2019-06-19 ENCOUNTER — HOSPITAL ENCOUNTER (OUTPATIENT)
Dept: POSTOP/PACU | Age: 64
Discharge: HOME | End: 2019-06-19
Payer: COMMERCIAL

## 2019-06-19 ENCOUNTER — ANESTHESIA EVENT (OUTPATIENT)
Dept: POSTOP/PACU | Age: 64
End: 2019-06-19

## 2019-06-19 VITALS
RESPIRATION RATE: 16 BRPM | TEMPERATURE: 99.2 F | DIASTOLIC BLOOD PRESSURE: 84 MMHG | OXYGEN SATURATION: 98 % | HEART RATE: 104 BPM | SYSTOLIC BLOOD PRESSURE: 160 MMHG

## 2019-06-19 VITALS
SYSTOLIC BLOOD PRESSURE: 183 MMHG | DIASTOLIC BLOOD PRESSURE: 90 MMHG | OXYGEN SATURATION: 100 % | RESPIRATION RATE: 8 BRPM

## 2019-06-19 PROCEDURE — 7100000001 HC PACU RECOVERY - ADDTL 15 MIN

## 2019-06-19 PROCEDURE — 2500000003 HC RX 250 WO HCPCS: Performed by: NURSE ANESTHETIST, CERTIFIED REGISTERED

## 2019-06-19 PROCEDURE — 90870 ELECTROCONVULSIVE THERAPY: CPT | Performed by: PSYCHIATRY & NEUROLOGY

## 2019-06-19 PROCEDURE — 7100000000 HC PACU RECOVERY - FIRST 15 MIN

## 2019-06-19 PROCEDURE — 90870 ELECTROCONVULSIVE THERAPY: CPT

## 2019-06-19 PROCEDURE — 3700000000 HC ANESTHESIA ATTENDED CARE

## 2019-06-19 PROCEDURE — 6360000002 HC RX W HCPCS: Performed by: NURSE ANESTHETIST, CERTIFIED REGISTERED

## 2019-06-19 PROCEDURE — 2580000003 HC RX 258: Performed by: NURSE ANESTHETIST, CERTIFIED REGISTERED

## 2019-06-19 RX ORDER — SODIUM CHLORIDE, SODIUM LACTATE, POTASSIUM CHLORIDE, CALCIUM CHLORIDE 600; 310; 30; 20 MG/100ML; MG/100ML; MG/100ML; MG/100ML
INJECTION, SOLUTION INTRAVENOUS CONTINUOUS
Status: DISCONTINUED | OUTPATIENT
Start: 2019-06-19 | End: 2019-06-20 | Stop reason: HOSPADM

## 2019-06-19 RX ORDER — TRAZODONE HYDROCHLORIDE 50 MG/1
100 TABLET ORAL NIGHTLY
DISCHARGE
Start: 2019-06-19 | End: 2019-08-16 | Stop reason: SDUPTHER

## 2019-06-19 RX ORDER — SODIUM CHLORIDE 9 MG/ML
INJECTION, SOLUTION INTRAVENOUS CONTINUOUS
Status: CANCELLED | OUTPATIENT
Start: 2019-06-19

## 2019-06-19 RX ORDER — SODIUM CHLORIDE 0.9 % (FLUSH) 0.9 %
10 SYRINGE (ML) INJECTION PRN
Status: CANCELLED | OUTPATIENT
Start: 2019-06-19

## 2019-06-19 RX ORDER — METOCLOPRAMIDE HYDROCHLORIDE 5 MG/ML
10 INJECTION INTRAMUSCULAR; INTRAVENOUS
Status: ACTIVE | OUTPATIENT
Start: 2019-06-19 | End: 2019-06-19

## 2019-06-19 RX ORDER — GLYCOPYRROLATE 1 MG/5 ML
SYRINGE (ML) INTRAVENOUS PRN
Status: DISCONTINUED | OUTPATIENT
Start: 2019-06-19 | End: 2019-06-19 | Stop reason: SDUPTHER

## 2019-06-19 RX ORDER — LIDOCAINE HYDROCHLORIDE 10 MG/ML
1 INJECTION, SOLUTION EPIDURAL; INFILTRATION; INTRACAUDAL; PERINEURAL
Status: ACTIVE | OUTPATIENT
Start: 2019-06-19 | End: 2019-06-19

## 2019-06-19 RX ORDER — FENTANYL CITRATE 50 UG/ML
50 INJECTION, SOLUTION INTRAMUSCULAR; INTRAVENOUS EVERY 10 MIN PRN
Status: DISCONTINUED | OUTPATIENT
Start: 2019-06-19 | End: 2019-06-20 | Stop reason: HOSPADM

## 2019-06-19 RX ORDER — MEPERIDINE HYDROCHLORIDE 25 MG/ML
12.5 INJECTION INTRAMUSCULAR; INTRAVENOUS; SUBCUTANEOUS EVERY 5 MIN PRN
Status: DISCONTINUED | OUTPATIENT
Start: 2019-06-19 | End: 2019-06-20 | Stop reason: HOSPADM

## 2019-06-19 RX ORDER — 0.9 % SODIUM CHLORIDE 0.9 %
500 INTRAVENOUS SOLUTION INTRAVENOUS ONCE
Status: CANCELLED | OUTPATIENT
Start: 2019-06-19 | End: 2019-06-19

## 2019-06-19 RX ORDER — HYDROCODONE BITARTRATE AND ACETAMINOPHEN 5; 325 MG/1; MG/1
1 TABLET ORAL PRN
Status: ACTIVE | OUTPATIENT
Start: 2019-06-19 | End: 2019-06-19

## 2019-06-19 RX ORDER — SODIUM CHLORIDE 9 MG/ML
INJECTION, SOLUTION INTRAVENOUS CONTINUOUS PRN
Status: DISCONTINUED | OUTPATIENT
Start: 2019-06-19 | End: 2019-06-19 | Stop reason: SDUPTHER

## 2019-06-19 RX ORDER — SODIUM CHLORIDE 0.9 % (FLUSH) 0.9 %
10 SYRINGE (ML) INJECTION EVERY 12 HOURS SCHEDULED
Status: DISCONTINUED | OUTPATIENT
Start: 2019-06-19 | End: 2019-06-20 | Stop reason: HOSPADM

## 2019-06-19 RX ORDER — HYDROCODONE BITARTRATE AND ACETAMINOPHEN 5; 325 MG/1; MG/1
2 TABLET ORAL PRN
Status: ACTIVE | OUTPATIENT
Start: 2019-06-19 | End: 2019-06-19

## 2019-06-19 RX ORDER — SODIUM CHLORIDE 0.9 % (FLUSH) 0.9 %
10 SYRINGE (ML) INJECTION EVERY 12 HOURS SCHEDULED
Status: CANCELLED | OUTPATIENT
Start: 2019-06-19

## 2019-06-19 RX ORDER — SODIUM CHLORIDE 0.9 % (FLUSH) 0.9 %
10 SYRINGE (ML) INJECTION PRN
Status: DISCONTINUED | OUTPATIENT
Start: 2019-06-19 | End: 2019-06-20 | Stop reason: HOSPADM

## 2019-06-19 RX ORDER — ONDANSETRON 2 MG/ML
4 INJECTION INTRAMUSCULAR; INTRAVENOUS
Status: DISCONTINUED | OUTPATIENT
Start: 2019-06-19 | End: 2019-06-19

## 2019-06-19 RX ORDER — DIPHENHYDRAMINE HYDROCHLORIDE 50 MG/ML
12.5 INJECTION INTRAMUSCULAR; INTRAVENOUS
Status: ACTIVE | OUTPATIENT
Start: 2019-06-19 | End: 2019-06-19

## 2019-06-19 RX ORDER — SUCCINYLCHOLINE/SOD CL,ISO/PF 100 MG/5ML
SYRINGE (ML) INTRAVENOUS PRN
Status: DISCONTINUED | OUTPATIENT
Start: 2019-06-19 | End: 2019-06-19 | Stop reason: SDUPTHER

## 2019-06-19 RX ADMIN — SODIUM CHLORIDE: 9 INJECTION, SOLUTION INTRAVENOUS at 12:18

## 2019-06-19 RX ADMIN — Medication 0.2 MG: at 12:24

## 2019-06-19 RX ADMIN — METHOHEXITAL SODIUM 100 MG: 500 INJECTION, POWDER, LYOPHILIZED, FOR SOLUTION INTRAMUSCULAR; INTRAVENOUS; RECTAL at 12:24

## 2019-06-19 RX ADMIN — Medication 60 MG: at 12:24

## 2019-06-19 NOTE — H&P
Update History & Physical    The patient's History and Physical of 6 / 1 / 19 was reviewed with the patient and there were no significant changes. I examined the patient and there were no significant changes from the previous History and Physical.    There were no vitals filed for this visit. Principal Problem:    MDD (major depressive disorder), recurrent episode ([facility] [address]  Resolved Problems:    * No resolved hospital problems. *        Plan: The risk, benefits, expected outcome, and alternative to the recommended procedure have been discussed with the patient. Patient understands and wants to proceed with the procedure.     Electronically signed by Becca Guzman MD on 6/19/19

## 2019-06-19 NOTE — OP NOTE
times daily. ., Disp: , Rfl:     zolpidem (AMBIEN) 5 MG tablet, Take 5 mg by mouth nightly as needed for Sleep. ., Disp: , Rfl:     tamsulosin (FLOMAX) 0.4 MG capsule, Take 1 capsule by mouth daily, Disp: 30 capsule, Rfl: 3    finasteride (PROSCAR) 5 MG tablet, Take 1 tablet by mouth daily, Disp: 30 tablet, Rfl: 3    vitamin B-12 (CYANOCOBALAMIN) 1000 MCG tablet, Take 1,000 mcg by mouth daily, Disp: , Rfl:     rotigotine (NEUPRO) 2 MG/24HR, Place 1 patch onto the skin daily Indications: taken off today for procedure , Disp: , Rfl:     lithium 300 MG tablet, Take 150 mg by mouth 2 times daily , Disp: , Rfl:     cyclobenzaprine (FLEXERIL) 10 MG tablet, Take 10 mg by mouth 3 times daily as needed for Muscle spasms, Disp: , Rfl:     acetaminophen (TYLENOL) 325 MG tablet, Take 650 mg by mouth 4 times daily , Disp: , Rfl:     mirtazapine (REMERON) 15 MG tablet, Take 30 mg by mouth nightly , Disp: , Rfl:     carbidopa-levodopa (SINEMET)  MG per tablet, Take 1 tablet by mouth 5 times daily , Disp: , Rfl:     Thiamine HCl (VITAMIN B-1 PO), Take 100 mg by mouth every morning , Disp: , Rfl:     Cholecalciferol (VITAMIN D3) 2000 units TABS, Take 2,000 Units by mouth every morning, Disp: , Rfl:     pregabalin (LYRICA) 100 MG capsule, Take 1 capsule by mouth 2 times daily for 30 days. ., Disp: 60 capsule, Rfl: 0    prochlorperazine (COMPAZINE) 10 MG tablet, Take 1 tablet by mouth every 6 hours as needed for Nausea for 30 days. , Disp: 30 tablet, Rfl: 2    Current Facility-Administered Medications:     fentaNYL (SUBLIMAZE) injection 50 mcg, 50 mcg, Intravenous, Q10 Min PRN, Zenia Bamberger, MD    HYDROmorphone (DILAUDID) injection 0.5 mg, 0.5 mg, Intravenous, Q10 Min PRN, Zenia Bamberger, MD    HYDROcodone-acetaminophen (NORCO) 5-325 MG per tablet 1 tablet, 1 tablet, Oral, PRN **OR** HYDROcodone-acetaminophen (NORCO) 5-325 MG per tablet 2 tablet, 2 tablet, Oral, PRN, Zenia Bamberger, MD Jaquelyn Branch diphenhydrAMINE (BENADRYL) injection 12.5 mg, 12.5 mg, Intravenous, Once PRN, Ktoa Parra MD    metoclopramide [facility]) injection 10 mg, 10 mg, Intravenous, Once PRN, Kota Parra MD    meperidine (DEMEROL) injection 12.5 mg, 12.5 mg, Intravenous, Q5 Min PRN, Kota Parra MD    lactated ringers infusion, , Intravenous, Continuous, Kota Parra MD    sodium chloride flush 0.9 % injection 10 mL, 10 mL, Intravenous, 2 times per day, Kota Parra MD    sodium chloride flush 0.9 % injection 10 mL, 10 mL, Intravenous, PRN, Kota Parra MD    lidocaine PF 1 % injection 1 mL, 1 mL, Intradermal, Once PRN, Kota Parra MD    Facility-Administered Medications Ordered in Other Encounters:     succinylcholine chloride (ANECTINE) injection, , , PRN, Elisabet Pham, APRN - CRNA, 60 mg at 06/19/19 1224    glycopyrrolate (ROBINUL) injection, , , PRN, Elisabet Pham, APRN - CRNA, 0.2 mg at 06/19/19 1224    0.9 % sodium chloride infusion, , , Continuous PRN, Elisabet Pham, APRN - CRNA    methohexital (BREVITAL SODIUM) injection, , , PRN, Elisabet Pham, APRN - CRNA, 100 mg at 06/19/19 1224    CHANGE IN PSYCHIATRY STATUS SINCE LAST ECT:   Getting better    INFORMED CONSENT OBTAINED : YES    PRE ECT MEDICATION ADMINISTERED:   no    NPO STATUS: Confirmed    ELECTRODE PLACEMENT : RUL    TIME OUT :  TEAM CONFIRMS THE CORRECT PATIENT, CORRECT PROCEDURE AND CORRECT SITE. DEVICE PARAMETERS:      Charge:288  PW- 0.3  Freq- 75  Duration- 8  EEG- 30  Motor- 23        VITALS: There were no vitals filed for this visit.       COMPLICATIONS: no      RECOMMENDATIONS FOR NEXT TREATMENT:  friday        PSYCHIATRIST:  Aysha Sandhu

## 2019-06-19 NOTE — ANESTHESIA PRE PROCEDURE
Department of Anesthesiology  Preprocedure Note       Name:  Diana Arrieta   Age:  66 y.o.  :  1955                                          MRN:  [id number]         Date:  2019      Surgeon: * No surgeons listed *    Procedure: ECT W/ ANESTHESIA    Medications prior to admission:   Prior to Admission medications    Medication Sig Start Date End Date Taking? Authorizing Provider   busPIRone (BUSPAR) 15 MG tablet Take 15 mg by mouth daily 19  Yes Historical Provider, MD   diazepam (VALIUM) 5 MG tablet Take 5 mg by mouth nightly. 19  Yes Historical Provider, MD   mirtazapine (REMERON) 30 MG tablet Take 30 mg by mouth nightly as needed 19  Yes Historical Provider, MD   LYRICA 50 MG capsule Take 50 mg by mouth as needed. 4/3/19  Yes Historical Provider, MD   Carbidopa-Levodopa ER (RYTARY) 48. MG CPCR Take 48. mg by mouth 2 times daily 19  Yes Historical Provider, MD   sertraline (ZOLOFT) 100 MG tablet Take 100 mg by mouth daily 19  Yes Historical Provider, MD   ibuprofen (ADVIL;MOTRIN) 600 MG tablet Take 1 tablet by mouth 3 times daily as needed for Pain 19  Yes Ro Zendejas MD   traZODone (DESYREL) 50 MG tablet Take 1 tablet by mouth nightly 19  Yes Ro Zendejas MD   pravastatin (PRAVACHOL) 20 MG tablet Take 1 tablet by mouth nightly 19  Yes Ro Zendejas MD   SUMAtriptan (IMITREX) 50 MG tablet Take 1 tablet by mouth daily as needed for Migraine (post ECT head ache on the day of ECT) 19  Yes Ro Zendejas MD   Apomorphine HCl (APOKYN) 30 MG/3ML SOCT Inject 0.4 mLs into the skin every 2 hours as needed (must be given two hours apart (not to exceed a total of 5 doses a day)) Must be given 2 hours apart. Not to exceed 5 doses per day.    Yes Historical Provider, MD   dipyridamole-aspirin (AGGRENOX)  MG per extended release capsule Take 1 capsule by mouth 2 times daily   Yes Historical Provider, MD   Menthol, Topical Analgesic, Outpatient Medications   Medication Sig Dispense Refill    busPIRone (BUSPAR) 15 MG tablet Take 15 mg by mouth daily      diazepam (VALIUM) 5 MG tablet Take 5 mg by mouth nightly.  mirtazapine (REMERON) 30 MG tablet Take 30 mg by mouth nightly as needed      LYRICA 50 MG capsule Take 50 mg by mouth as needed.  Carbidopa-Levodopa ER (RYTARY) 48. MG CPCR Take 48. mg by mouth 2 times daily      sertraline (ZOLOFT) 100 MG tablet Take 100 mg by mouth daily      ibuprofen (ADVIL;MOTRIN) 600 MG tablet Take 1 tablet by mouth 3 times daily as needed for Pain 90 tablet 0    traZODone (DESYREL) 50 MG tablet Take 1 tablet by mouth nightly      pravastatin (PRAVACHOL) 20 MG tablet Take 1 tablet by mouth nightly  0    SUMAtriptan (IMITREX) 50 MG tablet Take 1 tablet by mouth daily as needed for Migraine (post ECT head ache on the day of ECT)  0    Apomorphine HCl (APOKYN) 30 MG/3ML SOCT Inject 0.4 mLs into the skin every 2 hours as needed (must be given two hours apart (not to exceed a total of 5 doses a day)) Must be given 2 hours apart. Not to exceed 5 doses per day.  dipyridamole-aspirin (AGGRENOX)  MG per extended release capsule Take 1 capsule by mouth 2 times daily      Menthol, Topical Analgesic, (BIOFREEZE) 4 % GEL Apply topically 3 times daily      sennosides-docusate sodium (SENOKOT-S) 8.6-50 MG tablet Take 2 tablets by mouth nightly      morphine-naltrexone (EMBEDA) 20-0.8 MG CPCR Take 1 capsule by mouth 2 times daily. Raejean Blank zolpidem (AMBIEN) 5 MG tablet Take 5 mg by mouth nightly as needed for Sleep. Raejean Blank tamsulosin (FLOMAX) 0.4 MG capsule Take 1 capsule by mouth daily 30 capsule 3    finasteride (PROSCAR) 5 MG tablet Take 1 tablet by mouth daily 30 tablet 3    vitamin B-12 (CYANOCOBALAMIN) 1000 MCG tablet Take 1,000 mcg by mouth daily      rotigotine (NEUPRO) 2 MG/24HR Place 1 patch onto the skin daily Indications: taken off today for procedure       lithium 300 MG Problem List:    Patient Active Problem List   Diagnosis Code    Osteosarcoma ([facility] [address] C41.9    Hepatitis C B19.20    Cerebral infarction ([address] I63.9    MI (myocardial infarction) ([address] I21.9    Left hemiplegia (HCC) G81.94    Hep C w/o coma, chronic (HCC) B18.2    Stroke ([address] I63.9    MI (myocardial infarction) ([address] I21.9    Leukopenia D72.819    Hepatitis C B19.20    CAD (coronary artery disease) I25.10    Pneumonia J18.9    Kidney mass N28.89    Pancytopenia ([address] D61.818    HCAP (healthcare-associated pneumonia) J18.9    Altered mental status R41.82    Urinary retention R33.9    MDD (major depressive disorder), recurrent episode ([address] F33.9    Severe episode of recurrent major depressive disorder, without psychotic features ([address] F33.2    Chronic pain G89.29    Generalized muscle ache M79.10    Muscle spasticity M62.838    Parkinson disease, symptomatic ([address] Wilma De La O       Past Medical History:        Diagnosis Date    CAD (coronary artery disease)     CVA (cerebral infarction) 2007, 2008    x2    Hemiplegia as late effect of cerebrovascular accident ([address] 2012    This is heavily debated. MRI shows small vessel CVD and nothing to suggest significant prior stroke to leave hemiplegia. Neurology has suggested malingering.     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HCC)     Pneumonia     Stroke ([address]     X2       Past Surgical History:        Procedure Laterality Date    BONE RESECTION, RIB      BONY  TUMOR    BONY PELVIS SURGERY  1982    CORONARY ANGIOPLASTY WITH STENT PLACEMENT      X2    CORONARY ANGIOPLASTY WITH STENT PLACEMENT      x2    KIDNEY CYST REMOVAL      benign    KIDNEY SURGERY      TUMOR AND STONE REMOVED       Social History:    Social History     Tobacco Use    Smoking status: Former Smoker     Last attempt to quit: 2006     Years since quittin.3    Smokeless tobacco: Never Used   Substance Use Topics    Alcohol use: Yes     Alcohol/week: 0.6 oz     Types: 1 Cans of beer per week     Comment: occasional                                Counseling given: Not Answered      Vital Signs (Current): There were no vitals filed for this visit. BP Readings from Last 3 Encounters:   19 (!) 140/82   19 (!) 221/135   19 (!) 149/92       NPO Status:                                                                                 BMI:   Wt Readings from Last 3 Encounters:   19 248 lb (112.5 kg)   06/10/19 248 lb (112.5 kg)   19 220 lb (99.8 kg)     There is no height or weight on file to calculate BMI.    CBC:   Lab Results   Component Value Date    WBC 6.9 2019    RBC 4.71 2019    RBC 4.25 2012    HGB 13.5 2019    HCT 40.7 2019    MCV 86.4 2019    RDW 14.7 2019     2019       CMP:   Lab Results   Component Value Date     2019    K 4.1 2019     2019    CO2 27 2019    BUN 11 2019    CREATININE 0.57 2019    GFRAA >60.0 2019    LABGLOM >60.0 2019    GLUCOSE 112 2019    GLUCOSE 92 2012    PROT 8.1 2019    CALCIUM 9.5 2019    BILITOT <0.2 2019    ALKPHOS 95 2019    AST 18 2019    ALT <5 2019       POC Tests: No results for input(s): POCGLU, POCNA, POCK, POCCL, POCBUN, POCHEMO, POCHCT in the last 72 hours.     Coags:   Lab Results   Component Value Date    PROTIME 10.2 2019    PROTIME 10.8 2012    INR 1.0 2019    APTT 25.4 2013       HCG (If Applicable): No results found for: PREGTESTUR, PREGSERUM, HCG, HCGQUANT     ABGs: No results found for: PHART, PO2ART, PYY7ZZS, YHR6TJA, BEART, S2GAYQSP     Type & Screen (If Applicable):  No results found for: [facility]    Anesthesia Evaluation  Patient summary reviewed and Nursing notes reviewed  Airway: Mallampati: II  TM distance: >3 FB   Neck ROM: full  Mouth opening: > = 3 FB Dental: normal exam         Pulmonary:normal exam    (+) pneumonia: no interval change,                             Cardiovascular:    (+) past MI: no interval change, CAD: no interval change,       ECG reviewed                        Neuro/Psych:   (+) CVA: no interval change,             GI/Hepatic/Renal:   (+) hepatitis:, liver disease:,           Endo/Other:                     Abdominal:           Vascular: negative vascular ROS. Anesthesia Plan      general     ASA 3       Induction: intravenous. Anesthetic plan and risks discussed with patient. Plan discussed with CRNA.     Attending anesthesiologist reviewed and agrees with Pre Eval content              Hayden Mcguire MD   6/19/2019

## 2019-06-21 ENCOUNTER — HOSPITAL ENCOUNTER (OUTPATIENT)
Dept: POSTOP/PACU | Age: 64
Setting detail: OUTPATIENT SURGERY
Discharge: HOME | End: 2019-06-21
Attending: PSYCHIATRY & NEUROLOGY
Payer: COMMERCIAL

## 2019-06-21 ENCOUNTER — ANESTHESIA EVENT (OUTPATIENT)
Dept: POSTOP/PACU | Age: 64
End: 2019-06-21
Payer: COMMERCIAL

## 2019-06-21 ENCOUNTER — HOSPITAL ENCOUNTER (OUTPATIENT)
Age: 64
Setting detail: OUTPATIENT SURGERY
Discharge: HOME | End: 2019-06-21
Attending: PSYCHIATRY & NEUROLOGY | Admitting: PSYCHIATRY & NEUROLOGY
Payer: COMMERCIAL

## 2019-06-21 ENCOUNTER — ANESTHESIA (OUTPATIENT)
Dept: POSTOP/PACU | Age: 64
End: 2019-06-21
Payer: COMMERCIAL

## 2019-06-21 VITALS
RESPIRATION RATE: 20 BRPM | OXYGEN SATURATION: 97 % | DIASTOLIC BLOOD PRESSURE: 76 MMHG | TEMPERATURE: 98.7 F | HEART RATE: 90 BPM | SYSTOLIC BLOOD PRESSURE: 127 MMHG

## 2019-06-21 VITALS
RESPIRATION RATE: 17 BRPM | OXYGEN SATURATION: 96 % | SYSTOLIC BLOOD PRESSURE: 214 MMHG | DIASTOLIC BLOOD PRESSURE: 122 MMHG

## 2019-06-21 PROCEDURE — 7100000000 HC PACU RECOVERY - FIRST 15 MIN

## 2019-06-21 PROCEDURE — 2500000003 HC RX 250 WO HCPCS: Performed by: NURSE ANESTHETIST, CERTIFIED REGISTERED

## 2019-06-21 PROCEDURE — 3700000000 HC ANESTHESIA ATTENDED CARE

## 2019-06-21 PROCEDURE — 90870 ELECTROCONVULSIVE THERAPY: CPT | Performed by: PSYCHIATRY & NEUROLOGY

## 2019-06-21 PROCEDURE — 7100000001 HC PACU RECOVERY - ADDTL 15 MIN

## 2019-06-21 PROCEDURE — 90870 ELECTROCONVULSIVE THERAPY: CPT

## 2019-06-21 PROCEDURE — 6360000002 HC RX W HCPCS: Performed by: NURSE ANESTHETIST, CERTIFIED REGISTERED

## 2019-06-21 RX ORDER — SODIUM CHLORIDE, SODIUM LACTATE, POTASSIUM CHLORIDE, CALCIUM CHLORIDE 600; 310; 30; 20 MG/100ML; MG/100ML; MG/100ML; MG/100ML
INJECTION, SOLUTION INTRAVENOUS CONTINUOUS
Status: CANCELLED | OUTPATIENT
Start: 2019-06-21

## 2019-06-21 RX ORDER — HYDROCODONE BITARTRATE AND ACETAMINOPHEN 5; 325 MG/1; MG/1
2 TABLET ORAL PRN
Status: ACTIVE | OUTPATIENT
Start: 2019-06-21 | End: 2019-06-21

## 2019-06-21 RX ORDER — LABETALOL HYDROCHLORIDE 5 MG/ML
INJECTION, SOLUTION INTRAVENOUS PRN
Status: DISCONTINUED | OUTPATIENT
Start: 2019-06-21 | End: 2019-06-21 | Stop reason: SDUPTHER

## 2019-06-21 RX ORDER — SODIUM CHLORIDE 0.9 % (FLUSH) 0.9 %
10 SYRINGE (ML) INJECTION EVERY 12 HOURS SCHEDULED
Status: CANCELLED | OUTPATIENT
Start: 2019-06-21

## 2019-06-21 RX ORDER — ONDANSETRON 2 MG/ML
4 INJECTION INTRAMUSCULAR; INTRAVENOUS
Status: ACTIVE | OUTPATIENT
Start: 2019-06-21 | End: 2019-06-21

## 2019-06-21 RX ORDER — LIDOCAINE HYDROCHLORIDE 10 MG/ML
1 INJECTION, SOLUTION EPIDURAL; INFILTRATION; INTRACAUDAL; PERINEURAL
Status: CANCELLED | OUTPATIENT
Start: 2019-06-21 | End: 2019-06-21

## 2019-06-21 RX ORDER — SODIUM CHLORIDE 0.9 % (FLUSH) 0.9 %
10 SYRINGE (ML) INJECTION EVERY 12 HOURS SCHEDULED
Status: DISCONTINUED | OUTPATIENT
Start: 2019-06-21 | End: 2019-06-22 | Stop reason: HOSPADM

## 2019-06-21 RX ORDER — MEPERIDINE HYDROCHLORIDE 25 MG/ML
12.5 INJECTION INTRAMUSCULAR; INTRAVENOUS; SUBCUTANEOUS EVERY 5 MIN PRN
Status: DISCONTINUED | OUTPATIENT
Start: 2019-06-21 | End: 2019-06-22 | Stop reason: HOSPADM

## 2019-06-21 RX ORDER — 0.9 % SODIUM CHLORIDE 0.9 %
500 INTRAVENOUS SOLUTION INTRAVENOUS ONCE
Status: DISCONTINUED | OUTPATIENT
Start: 2019-06-21 | End: 2019-06-22 | Stop reason: HOSPADM

## 2019-06-21 RX ORDER — SODIUM CHLORIDE 0.9 % (FLUSH) 0.9 %
10 SYRINGE (ML) INJECTION PRN
Status: DISCONTINUED | OUTPATIENT
Start: 2019-06-21 | End: 2019-06-22 | Stop reason: HOSPADM

## 2019-06-21 RX ORDER — DIPHENHYDRAMINE HYDROCHLORIDE 50 MG/ML
12.5 INJECTION INTRAMUSCULAR; INTRAVENOUS
Status: ACTIVE | OUTPATIENT
Start: 2019-06-21 | End: 2019-06-21

## 2019-06-21 RX ORDER — SODIUM CHLORIDE 9 MG/ML
INJECTION, SOLUTION INTRAVENOUS CONTINUOUS
Status: DISCONTINUED | OUTPATIENT
Start: 2019-06-21 | End: 2019-06-22 | Stop reason: HOSPADM

## 2019-06-21 RX ORDER — SODIUM CHLORIDE 0.9 % (FLUSH) 0.9 %
10 SYRINGE (ML) INJECTION PRN
Status: CANCELLED | OUTPATIENT
Start: 2019-06-21

## 2019-06-21 RX ORDER — METOCLOPRAMIDE HYDROCHLORIDE 5 MG/ML
10 INJECTION INTRAMUSCULAR; INTRAVENOUS
Status: ACTIVE | OUTPATIENT
Start: 2019-06-21 | End: 2019-06-21

## 2019-06-21 RX ORDER — SUCCINYLCHOLINE/SOD CL,ISO/PF 100 MG/5ML
SYRINGE (ML) INTRAVENOUS PRN
Status: DISCONTINUED | OUTPATIENT
Start: 2019-06-21 | End: 2019-06-21 | Stop reason: SDUPTHER

## 2019-06-21 RX ORDER — GLYCOPYRROLATE 1 MG/5 ML
SYRINGE (ML) INTRAVENOUS PRN
Status: DISCONTINUED | OUTPATIENT
Start: 2019-06-21 | End: 2019-06-21 | Stop reason: SDUPTHER

## 2019-06-21 RX ORDER — HYDROCODONE BITARTRATE AND ACETAMINOPHEN 5; 325 MG/1; MG/1
1 TABLET ORAL PRN
Status: ACTIVE | OUTPATIENT
Start: 2019-06-21 | End: 2019-06-21

## 2019-06-21 RX ORDER — BACLOFEN 10 MG/1
10 TABLET ORAL 2 TIMES DAILY
COMMUNITY
Start: 2019-06-17

## 2019-06-21 RX ORDER — FENTANYL CITRATE 50 UG/ML
50 INJECTION, SOLUTION INTRAMUSCULAR; INTRAVENOUS EVERY 10 MIN PRN
Status: DISCONTINUED | OUTPATIENT
Start: 2019-06-21 | End: 2019-06-22 | Stop reason: HOSPADM

## 2019-06-21 RX ORDER — 0.9 % SODIUM CHLORIDE 0.9 %
500 INTRAVENOUS SOLUTION INTRAVENOUS ONCE
Status: CANCELLED | OUTPATIENT
Start: 2019-06-21 | End: 2019-06-21

## 2019-06-21 RX ADMIN — LABETALOL HYDROCHLORIDE 10 MG: 5 INJECTION, SOLUTION INTRAVENOUS at 13:09

## 2019-06-21 RX ADMIN — METHOHEXITAL SODIUM 100 MG: 500 INJECTION, POWDER, LYOPHILIZED, FOR SOLUTION INTRAMUSCULAR; INTRAVENOUS; RECTAL at 13:02

## 2019-06-21 RX ADMIN — Medication 0.2 MG: at 13:02

## 2019-06-21 RX ADMIN — Medication 70 MG: at 13:02

## 2019-06-21 NOTE — H&P
Update History & Physical    The patient's History and Physical of 6 / 1 / 19 was reviewed with the patient and there were no significant changes. I examined the patient and there were no significant changes from the previous History and Physical.    There were no vitals filed for this visit. Principal Problem:    Severe episode of recurrent major depressive disorder, without psychotic features ([facility] [address]  Resolved Problems:    * No resolved hospital problems. *        Plan: The risk, benefits, expected outcome, and alternative to the recommended procedure have been discussed with the patient. Patient understands and wants to proceed with the procedure.     Electronically signed by Zayra Hurd MD on 6/21/19

## 2019-06-21 NOTE — ANESTHESIA PRE PROCEDURE
Department of Anesthesiology  Preprocedure Note       Name:  Seema Miguel   Age:  66 y.o.  :  1955                                          MRN:  [id number]         Date:  2019      Surgeon: * No surgeons listed *    Procedure: ECT W/ ANESTHESIA    Medications prior to admission:   Prior to Admission medications    Medication Sig Start Date End Date Taking? Authorizing Provider   traZODone (DESYREL) 50 MG tablet Take 2 tablets by mouth nightly 19   Viry Correa MD   busPIRone (BUSPAR) 15 MG tablet Take 15 mg by mouth daily 19   Historical Provider, MD   diazepam (VALIUM) 5 MG tablet Take 5 mg by mouth nightly. 19   Historical Provider, MD   mirtazapine (REMERON) 30 MG tablet Take 30 mg by mouth nightly as needed 19   Historical Provider, MD   LYRICA 50 MG capsule Take 50 mg by mouth as needed. 4/3/19   Historical Provider, MD   Carbidopa-Levodopa ER (RYTARY) 48. MG CPCR Take 48. mg by mouth 2 times daily 19   Historical Provider, MD   sertraline (ZOLOFT) 100 MG tablet Take 100 mg by mouth daily 19   Historical Provider, MD   ibuprofen (ADVIL;MOTRIN) 600 MG tablet Take 1 tablet by mouth 3 times daily as needed for Pain 19   Viry Correa MD   pravastatin (PRAVACHOL) 20 MG tablet Take 1 tablet by mouth nightly 19   Viry Correa MD   SUMAtriptan (IMITREX) 50 MG tablet Take 1 tablet by mouth daily as needed for Migraine (post ECT head ache on the day of ECT) 19   Viry Correa MD   Apomorphine HCl (APOKYN) 30 MG/3ML SOCT Inject 0.4 mLs into the skin every 2 hours as needed (must be given two hours apart (not to exceed a total of 5 doses a day)) Must be given 2 hours apart. Not to exceed 5 doses per day.     Historical Provider, MD   dipyridamole-aspirin (AGGRENOX)  MG per extended release capsule Take 1 capsule by mouth 2 times daily    Historical Provider, MD   Menthol, Topical Analgesic, (BIOFREEZE) 4 % GEL Apply topically 3 times daily    Historical Provider, MD   sennosides-docusate sodium (SENOKOT-S) 8.6-50 MG tablet Take 2 tablets by mouth nightly    Historical Provider, MD   morphine-naltrexone (EMBEDA) 20-0.8 MG CPCR Take 1 capsule by mouth 2 times daily. Trace Mark Historical Provider, MD   zolpidem (AMBIEN) 5 MG tablet Take 5 mg by mouth nightly as needed for Sleep. Trace Mark Historical Provider, MD   tamsulosin (FLOMAX) 0.4 MG capsule Take 1 capsule by mouth daily 1/15/19   Darien Baltazar MD   finasteride (PROSCAR) 5 MG tablet Take 1 tablet by mouth daily 1/15/19   Darien Baltazar MD   pregabalin (LYRICA) 100 MG capsule Take 1 capsule by mouth 2 times daily for 30 days. . 11/19/18 6/14/19  Rigoberto Bonilla MD   vitamin B-12 (CYANOCOBALAMIN) 1000 MCG tablet Take 1,000 mcg by mouth daily    Historical Provider, MD   rotigotine (NEUPRO) 2 MG/24HR Place 1 patch onto the skin daily Indications: taken off today for procedure     Historical Provider, MD   lithium 300 MG tablet Take 150 mg by mouth 2 times daily     Historical Provider, MD   cyclobenzaprine (FLEXERIL) 10 MG tablet Take 10 mg by mouth 3 times daily as needed for Muscle spasms    Historical Provider, MD   acetaminophen (TYLENOL) 325 MG tablet Take 650 mg by mouth 4 times daily     Historical Provider, MD   mirtazapine (REMERON) 15 MG tablet Take 30 mg by mouth nightly     Historical Provider, MD   carbidopa-levodopa (SINEMET)  MG per tablet Take 1 tablet by mouth 5 times daily     Historical Provider, MD   Thiamine HCl (VITAMIN B-1 PO) Take 100 mg by mouth every morning     Historical Provider, MD   Cholecalciferol (VITAMIN D3) 2000 units TABS Take 2,000 Units by mouth every morning    Historical Provider, MD   prochlorperazine (COMPAZINE) 10 MG tablet Take 1 tablet by mouth every 6 hours as needed for Nausea for 30 days.  9/17/12 6/14/19  Nina Murphy MD       Current medications:    Current Facility-Administered Medications   Medication Dose Route Frequency Provider Last Rate Last Dose  fentaNYL (SUBLIMAZE) injection 50 mcg  50 mcg Intravenous Q10 Min PRN Oseas Saleh MD        HYDROmorphone (DILAUDID) injection 0.5 mg  0.5 mg Intravenous Q10 Min PRN Oseas Saleh MD        HYDROcodone-acetaminophen [facility]) 5-325 MG per tablet 1 tablet  1 tablet Oral PRN Oseas Saleh MD        Or    HYDROcodone-acetaminophen [facility]) 5-325 MG per tablet 2 tablet  2 tablet Oral PRN Oseas Saleh MD        diphenhydrAMINE (BENADRYL) injection 12.5 mg  12.5 mg Intravenous Once PRN Oseas Saleh MD        ondansetron [facility]) injection 4 mg  4 mg Intravenous Once PRN Oseas Saleh MD        metoclopramide [facility]) injection 10 mg  10 mg Intravenous Once PRN Oseas Saleh MD        meperidine (DEMEROL) injection 12.5 mg  12.5 mg Intravenous Q5 Min PRN Oseas Saleh MD         No current outpatient medications on file. Allergies:     Allergies   Allergen Reactions    Pcn [Penicillins] Hives    Penicillins     Ppd [Tuberculin Purified Protein Derivative]     Zofran [Ondansetron Hcl]        Problem List:    Patient Active Problem List   Diagnosis Code    [facility] C41.9    Hepatitis C B19.20    Cerebral infarction ([facility] [address] I63.9    MI (myocardial infarction) ([facility] [address] I21.9    Left hemiplegia ([facility] [address] G81.94    Hep C w/o coma, chronic (HCC) B18.2    Stroke ([facility] [address] I63.9    MI (myocardial infarction) ([facility] [address] I21.9    Leukopenia D72.819    Hepatitis C B19.20    CAD (coronary artery disease) I25.10    Pneumonia J18.9    Kidney mass N28.89    Pancytopenia ([facility] [address] D61.818    HCAP (healthcare-associated pneumonia) J18.9    Altered mental status R41.82    Urinary retention R33.9    MDD (major depressive disorder), recurrent episode ([facility] [address] F33.9    Severe episode of recurrent major depressive disorder, without psychotic features ([facility]) F33.2    Chronic pain G89.29    Generalized muscle ache M79.10    Muscle spasticity M62.838  Parkinson disease, symptomatic ([facility] [address] Ginger Arnold       Past Medical History:        Diagnosis Date    CAD (coronary artery disease)     CVA (cerebral infarction) , 2008    x2    Hemiplegia as late effect of cerebrovascular accident ([facility] [address] 2012    This is heavily debated. MRI shows small vessel CVD and nothing to suggest significant prior stroke to leave hemiplegia. Neurology has suggested malingering.  Hepatitis C     Hepatitis C 2011    Kidney mass     Leukopenia     MI (myocardial infarction) ([facility] [address]     MI (myocardial infarction) ([facility] [address] 2009    clean coronaries, no stenting - this is questionable    Osteosarcoma (HCC)     Pneumonia     Stroke ([facility] [address]     X2       Past Surgical History:        Procedure Laterality Date    BONE RESECTION, RIB      BONY  TUMOR    BONY PELVIS SURGERY  1982    CORONARY ANGIOPLASTY WITH STENT PLACEMENT      X2    CORONARY ANGIOPLASTY WITH STENT PLACEMENT      x2    KIDNEY CYST REMOVAL      benign    KIDNEY SURGERY      TUMOR AND STONE REMOVED       Social History:    Social History     Tobacco Use    Smoking status: Former Smoker     Last attempt to quit: 2006     Years since quittin.3    Smokeless tobacco: Never Used   Substance Use Topics    Alcohol use: Yes     Alcohol/week: 0.6 oz     Types: 1 Cans of beer per week     Comment: occasional                                Counseling given: Not Answered      Vital Signs (Current): There were no vitals filed for this visit.                                            BP Readings from Last 3 Encounters:   19 (!) 160/84   19 (!) 183/90   19 (!) 140/82       NPO Status:                                                                                 BMI:   Wt Readings from Last 3 Encounters:   19 248 lb (112.5 kg)   06/10/19 248 lb (112.5 kg)   19 220 lb (99.8 kg)     There is no height or weight on file to calculate BMI.    CBC:   Lab Results   Component Value Date    WBC 6.9 05/31/2019    RBC 4.71 05/31/2019    RBC 4.25 02/26/2012    HGB 13.5 05/31/2019    HCT 40.7 05/31/2019    MCV 86.4 05/31/2019    RDW 14.7 05/31/2019     05/31/2019       CMP:   Lab Results   Component Value Date     05/31/2019    K 4.1 06/04/2019     05/31/2019    CO2 27 05/31/2019    BUN 11 05/31/2019    CREATININE 0.57 05/31/2019    GFRAA >60.0 05/31/2019    LABGLOM >60.0 05/31/2019    GLUCOSE 112 05/31/2019    GLUCOSE 92 02/26/2012    PROT 8.1 05/31/2019    CALCIUM 9.5 05/31/2019    BILITOT <0.2 05/31/2019    ALKPHOS 95 05/31/2019    AST 18 05/31/2019    ALT <5 05/31/2019       POC Tests: No results for input(s): POCGLU, POCNA, POCK, POCCL, POCBUN, POCHEMO, POCHCT in the last 72 hours. Coags:   Lab Results   Component Value Date    PROTIME 10.2 01/11/2019    PROTIME 10.8 02/20/2012    INR 1.0 01/11/2019    APTT 25.4 03/31/2013       HCG (If Applicable): No results found for: PREGTESTUR, PREGSERUM, HCG, HCGQUANT     ABGs: No results found for: PHART, PO2ART, XBU8XQX, IPB8ARA, BEART, C1BPKBEX     Type & Screen (If Applicable):  No results found for: [facility]    Anesthesia Evaluation  Patient summary reviewed and Nursing notes reviewed no history of anesthetic complications:   Airway: Mallampati: II  TM distance: >3 FB   Neck ROM: full  Mouth opening: > = 3 FB Dental:          Pulmonary:normal exam    (+) pneumonia:                             Cardiovascular:    (+) past MI: no interval change, CAD: no interval change,       ECG reviewed                        Neuro/Psych:   (+) CVA:, psychiatric history:            GI/Hepatic/Renal:   (+) hepatitis: C, liver disease:, morbid obesity          Endo/Other: Negative Endo/Other ROS                    Abdominal:           Vascular: negative vascular ROS. Anesthesia Plan      general     ASA 3       Induction: intravenous. Anesthetic plan and risks discussed with patient.       Plan discussed with CRNA.    Attending anesthesiologist reviewed and agrees with Pre Eval content              Bharathi Alvarado MD   6/21/2019

## 2019-06-21 NOTE — ANESTHESIA POSTPROCEDURE EVALUATION
Department of Anesthesiology  Postprocedure Note    Patient: Darylene Cordia  MRN: [id number]  YOB: 1955  Date of evaluation: 6/21/2019  Time:  1:12 PM     Procedure Summary     Date:  06/21/19 Room / Location:  [facility] PACU    Anesthesia Start:  1302 Anesthesia Stop:  1310    Procedure:  ECT W/ ANESTHESIA Diagnosis:      Scheduled Providers:   Responsible Provider:  Oseas Saleh MD    Anesthesia Type:  general ASA Status:  3          Anesthesia Type: general    Francie Phase I:      Francie Phase II:      Last vitals: Reviewed and per EMR flowsheets.        Anesthesia Post Evaluation    Patient location during evaluation: PACU  Patient participation: complete - patient participated  Level of consciousness: awake and alert  Pain score: 0  Airway patency: patent  Nausea & Vomiting: no nausea  Complications: no  Cardiovascular status: hemodynamically stable  Respiratory status: acceptable  Hydration status: stable

## 2019-06-21 NOTE — OP NOTE
PAMELA Hutchison - CRNA, 0.2 mg at 06/21/19 1302    CHANGE IN PSYCHIATRY STATUS SINCE LAST ECT:   Getting better    INFORMED CONSENT OBTAINED : YES    PRE ECT MEDICATION ADMINISTERED:   no    NPO STATUS: Confirmed    ELECTRODE PLACEMENT : RUL    TIME OUT :  TEAM CONFIRMS THE CORRECT PATIENT, CORRECT PROCEDURE AND CORRECT SITE. DEVICE PARAMETERS:     Charge:[id number]  PW- 0.3  Freq- 80  Duration- 8  EEG- 23  Motor- 20      VITALS: There were no vitals filed for this visit.       COMPLICATIONS: no      RECOMMENDATIONS FOR NEXT TREATMENT:  monday        PSYCHIATRIST:  Altagracia Akins

## 2019-06-27 ENCOUNTER — ANESTHESIA (OUTPATIENT)
Dept: POSTOP/PACU | Age: 64
End: 2019-06-27
Payer: COMMERCIAL

## 2019-06-27 ENCOUNTER — ANESTHESIA EVENT (OUTPATIENT)
Dept: POSTOP/PACU | Age: 64
End: 2019-06-27
Payer: COMMERCIAL

## 2019-06-27 ENCOUNTER — HOSPITAL ENCOUNTER (OUTPATIENT)
Dept: POSTOP/PACU | Age: 64
Setting detail: OUTPATIENT SURGERY
Discharge: HOME | End: 2019-06-27
Attending: PSYCHIATRY & NEUROLOGY | Admitting: PSYCHIATRY & NEUROLOGY
Payer: COMMERCIAL

## 2019-06-27 VITALS
RESPIRATION RATE: 8 BRPM | DIASTOLIC BLOOD PRESSURE: 95 MMHG | SYSTOLIC BLOOD PRESSURE: 189 MMHG | OXYGEN SATURATION: 100 %

## 2019-06-27 VITALS
OXYGEN SATURATION: 96 % | BODY MASS INDEX: 32.72 KG/M2 | WEIGHT: 248 LBS | TEMPERATURE: 98.9 F | SYSTOLIC BLOOD PRESSURE: 140 MMHG | HEART RATE: 97 BPM | RESPIRATION RATE: 15 BRPM | DIASTOLIC BLOOD PRESSURE: 85 MMHG

## 2019-06-27 PROCEDURE — 7100000000 HC PACU RECOVERY - FIRST 15 MIN

## 2019-06-27 PROCEDURE — 7100000001 HC PACU RECOVERY - ADDTL 15 MIN

## 2019-06-27 PROCEDURE — 3700000000 HC ANESTHESIA ATTENDED CARE

## 2019-06-27 PROCEDURE — 2580000003 HC RX 258: Performed by: PSYCHIATRY & NEUROLOGY

## 2019-06-27 PROCEDURE — 90870 ELECTROCONVULSIVE THERAPY: CPT

## 2019-06-27 PROCEDURE — 2580000003 HC RX 258: Performed by: STUDENT IN AN ORGANIZED HEALTH CARE EDUCATION/TRAINING PROGRAM

## 2019-06-27 PROCEDURE — 2500000003 HC RX 250 WO HCPCS: Performed by: STUDENT IN AN ORGANIZED HEALTH CARE EDUCATION/TRAINING PROGRAM

## 2019-06-27 PROCEDURE — 6360000002 HC RX W HCPCS: Performed by: STUDENT IN AN ORGANIZED HEALTH CARE EDUCATION/TRAINING PROGRAM

## 2019-06-27 PROCEDURE — 90870 ELECTROCONVULSIVE THERAPY: CPT | Performed by: PSYCHIATRY & NEUROLOGY

## 2019-06-27 RX ORDER — SODIUM CHLORIDE 0.9 % (FLUSH) 0.9 %
10 SYRINGE (ML) INJECTION PRN
Status: CANCELLED | OUTPATIENT
Start: 2019-06-27

## 2019-06-27 RX ORDER — GLYCOPYRROLATE 1 MG/5 ML
SYRINGE (ML) INTRAVENOUS PRN
Status: DISCONTINUED | OUTPATIENT
Start: 2019-06-27 | End: 2019-06-27 | Stop reason: SDUPTHER

## 2019-06-27 RX ORDER — SUCCINYLCHOLINE/SOD CL,ISO/PF 100 MG/5ML
SYRINGE (ML) INTRAVENOUS PRN
Status: DISCONTINUED | OUTPATIENT
Start: 2019-06-27 | End: 2019-06-27 | Stop reason: SDUPTHER

## 2019-06-27 RX ORDER — SODIUM CHLORIDE 0.9 % (FLUSH) 0.9 %
10 SYRINGE (ML) INJECTION EVERY 12 HOURS SCHEDULED
Status: CANCELLED | OUTPATIENT
Start: 2019-06-27

## 2019-06-27 RX ORDER — SODIUM CHLORIDE 9 MG/ML
INJECTION, SOLUTION INTRAVENOUS CONTINUOUS PRN
Status: DISCONTINUED | OUTPATIENT
Start: 2019-06-27 | End: 2019-06-27 | Stop reason: SDUPTHER

## 2019-06-27 RX ORDER — 0.9 % SODIUM CHLORIDE 0.9 %
500 INTRAVENOUS SOLUTION INTRAVENOUS ONCE
Status: CANCELLED | OUTPATIENT
Start: 2019-06-27 | End: 2019-06-27

## 2019-06-27 RX ORDER — SODIUM CHLORIDE 9 MG/ML
INJECTION, SOLUTION INTRAVENOUS CONTINUOUS
Status: DISCONTINUED | OUTPATIENT
Start: 2019-06-27 | End: 2019-06-27 | Stop reason: HOSPADM

## 2019-06-27 RX ORDER — DEXAMETHASONE SODIUM PHOSPHATE 4 MG/ML
INJECTION, SOLUTION INTRA-ARTICULAR; INTRALESIONAL; INTRAMUSCULAR; INTRAVENOUS; SOFT TISSUE PRN
Status: DISCONTINUED | OUTPATIENT
Start: 2019-06-27 | End: 2019-06-27 | Stop reason: SDUPTHER

## 2019-06-27 RX ADMIN — SODIUM CHLORIDE 1000 ML: 9 INJECTION, SOLUTION INTRAVENOUS at 11:41

## 2019-06-27 RX ADMIN — Medication 0.2 MG: at 11:59

## 2019-06-27 RX ADMIN — METHOHEXITAL SODIUM 75 MG: 500 INJECTION, POWDER, LYOPHILIZED, FOR SOLUTION INTRAMUSCULAR; INTRAVENOUS; RECTAL at 12:01

## 2019-06-27 RX ADMIN — SODIUM CHLORIDE: 9 INJECTION, SOLUTION INTRAVENOUS at 11:52

## 2019-06-27 RX ADMIN — DEXAMETHASONE SODIUM PHOSPHATE 4 MG: 4 INJECTION, SOLUTION INTRA-ARTICULAR; INTRALESIONAL; INTRAMUSCULAR; INTRAVENOUS; SOFT TISSUE at 12:00

## 2019-06-27 RX ADMIN — Medication 40 MG: at 12:02

## 2019-06-27 NOTE — OP NOTE
Department of Psychiatry  Electroconvulsive Therapy Treatment Note        6/27/2019    PURPOSE FOR ECT:  Therapeutic NUMBER: 8    DIAGNOSIS:   Active Problems:    Severe episode of recurrent major depressive disorder, without psychotic features ([facility] [address]  Resolved Problems:    * No resolved hospital problems. *      MEDICATIONS:    Current Facility-Administered Medications:     0.9 % sodium chloride infusion, , Intravenous, Continuous, Cassy Canales MD, Last Rate: 100 mL/hr at 06/27/19 1141, 1,000 mL at 06/27/19 1141    Facility-Administered Medications Ordered in Other Encounters:     0.9 % sodium chloride infusion, , , Continuous PRN, Helyn Ax Heather, DO, Stopped at 06/27/19 1205    glycopyrrolate (ROBINUL) injection, , , PRN, Gadiel Kicks, DO, 0.2 mg at 06/27/19 1159    dexamethasone (DECADRON) injection, , , PRN, Hampton Kicks, DO, 4 mg at 06/27/19 1200    methohexital (BREVITAL SODIUM) injection, , , PRN, Helyn Ax Heather, DO, 75 mg at 06/27/19 1201    succinylcholine chloride (ANECTINE) injection, , , PRN, Gadiel Kicks, DO, 40 mg at 06/27/19 1202    CHANGE IN PSYCHIATRY STATUS SINCE LAST ECT:   Getting better    INFORMED CONSENT OBTAINED : YES    PRE ECT MEDICATION ADMINISTERED:   yes    NPO STATUS: Confirmed    ELECTRODE PLACEMENT : RUL    TIME OUT :  TEAM CONFIRMS THE CORRECT PATIENT, CORRECT PROCEDURE AND CORRECT SITE.     DEVICE PARAMETERS:   Charge:345  PW- 0.3  Freq- 90  Duration- 8  EEG- 40  Motor- 30      VITALS:   Vitals:    06/27/19 1113   BP: (!) 140/73   Pulse: 97   Resp: 16   Temp: 99 °F (37.2 °C)   SpO2: 79%         COMPLICATIONS: NO      RECOMMENDATIONS FOR NEXT TREATMENT:  monday        PSYCHIATRIST:  Yolis Tracy

## 2019-06-27 NOTE — ANESTHESIA PRE PROCEDURE
Historical Provider, MD   Menthol, Topical Analgesic, (BIOFREEZE) 4 % GEL Apply topically 3 times daily    Historical Provider, MD   sennosides-docusate sodium (SENOKOT-S) 8.6-50 MG tablet Take 2 tablets by mouth nightly    Historical Provider, MD   morphine-naltrexone (EMBEDA) 20-0.8 MG CPCR Take 1 capsule by mouth 2 times daily. Clemente Bolton Historical Provider, MD   zolpidem (AMBIEN) 5 MG tablet Take 5 mg by mouth nightly as needed for Sleep. Clemente Bolton Historical Provider, MD   tamsulosin (FLOMAX) 0.4 MG capsule Take 1 capsule by mouth daily 1/15/19   Elkin Duncan MD   finasteride (PROSCAR) 5 MG tablet Take 1 tablet by mouth daily 1/15/19   Elkin Duncan MD   vitamin B-12 (CYANOCOBALAMIN) 1000 MCG tablet Take 1,000 mcg by mouth daily    Historical Provider, MD   rotigotine (NEUPRO) 2 MG/24HR Place 1 patch onto the skin daily Indications: taken off today for procedure     Historical Provider, MD   lithium 300 MG tablet Take 150 mg by mouth 2 times daily     Historical Provider, MD   cyclobenzaprine (FLEXERIL) 10 MG tablet Take 10 mg by mouth 3 times daily as needed for Muscle spasms    Historical Provider, MD   acetaminophen (TYLENOL) 325 MG tablet Take 650 mg by mouth 4 times daily     Historical Provider, MD   carbidopa-levodopa (SINEMET)  MG per tablet Take 1 tablet by mouth 5 times daily     Historical Provider, MD   Thiamine HCl (VITAMIN B-1 PO) Take 100 mg by mouth every morning     Historical Provider, MD   Cholecalciferol (VITAMIN D3) 2000 units TABS Take 2,000 Units by mouth every morning    Historical Provider, MD   prochlorperazine (COMPAZINE) 10 MG tablet Take 1 tablet by mouth every 6 hours as needed for Nausea for 30 days. 9/17/12 6/14/19  Rip Nageotte, MD       Current medications:    No current facility-administered medications for this encounter. Allergies:     Allergies   Allergen Reactions    Pcn [Penicillins] Hives    Penicillins     Ppd [Tuberculin Purified Protein Derivative]     Zofran 02/20/2012    INR 1.0 01/11/2019    APTT 25.4 03/31/2013       HCG (If Applicable): No results found for: PREGTESTUR, PREGSERUM, HCG, HCGQUANT     ABGs: No results found for: PHART, PO2ART, MEZ4OZM, YDR4IUW, BEART, E7AAQVMY     Type & Screen (If Applicable):  No results found for: [facility], [address]    Anesthesia Evaluation  Patient summary reviewed and Nursing notes reviewed no history of anesthetic complications:   Airway: Mallampati: II  TM distance: >3 FB   Neck ROM: full  Mouth opening: > = 3 FB Dental: normal exam         Pulmonary:Negative Pulmonary ROS and normal exam  breath sounds clear to auscultation  (+) pneumonia:                             Cardiovascular:Negative CV ROS  Exercise tolerance: good (>4 METS),   (+) past MI:, CAD:, CABG/stent:, hyperlipidemia      ECG reviewed  Rhythm: regular  Rate: normal           Beta Blocker:  Not on Beta Blocker      ROS comment: Normal sinus rhythm  Normal ECG  When compared with ECG of 12-JAN-2019 18:02,  premature ventricular complexes are no longer present     Neuro/Psych:   (+) CVA:, neuromuscular disease: Parkinson's disease, psychiatric history:            GI/Hepatic/Renal: Neg GI/Hepatic/Renal ROS  (+) hepatitis: C, liver disease:,           Endo/Other: Negative Endo/Other ROS             Pt had PAT visit. Abdominal:           Vascular: negative vascular ROS. Anesthesia Plan      general     ASA 3     (Mask)  Induction: intravenous. MIPS: Postoperative opioids intended and Prophylactic antiemetics administered. Anesthetic plan and risks discussed with patient. Plan discussed with CRNA.     Attending anesthesiologist reviewed and agrees with Luke Mathew DO   6/27/2019

## 2019-06-27 NOTE — ANESTHESIA POSTPROCEDURE EVALUATION
Department of Anesthesiology  Postprocedure Note    Patient: Mikael Miguel  MRN: [id number]  YOB: 1955  Date of evaluation: 6/27/2019  Time:  12:05 PM     Procedure Summary     Date:  06/27/19 [place]  [facility] [place]    Anesthesia Start:  1154 Anesthesia Stop:      Procedure:  ECT W/ ANESTHESIA Diagnosis:      Scheduled Providers:   Responsible Provider:  Felix Francisco DO    Anesthesia Type:  general ASA Status:  3          Anesthesia Type: general    Francie Phase I: Francie Score: 10    Francie Phase II:      Last vitals: Reviewed and per EMR flowsheets.        Anesthesia Post Evaluation    Patient location during evaluation: bedside  Patient participation: complete - patient participated  Level of consciousness: awake and awake and alert  Pain score: 0  Airway patency: patent  Nausea & Vomiting: no nausea and no vomiting  Complications: no  Cardiovascular status: blood pressure returned to baseline and hemodynamically stable  Respiratory status: acceptable  Hydration status: euvolemic

## 2019-07-03 ENCOUNTER — ANESTHESIA EVENT (OUTPATIENT)
Dept: POSTOP/PACU | Age: 64
End: 2019-07-03
Payer: COMMERCIAL

## 2019-07-03 ENCOUNTER — HOSPITAL ENCOUNTER (OUTPATIENT)
Dept: POSTOP/PACU | Age: 64
Discharge: NURSING, REHAB OR LONG-TERM CARE | End: 2019-07-03
Attending: PSYCHIATRY & NEUROLOGY | Admitting: PSYCHIATRY & NEUROLOGY
Payer: COMMERCIAL

## 2019-07-03 ENCOUNTER — ANESTHESIA (OUTPATIENT)
Dept: POSTOP/PACU | Age: 64
End: 2019-07-03
Payer: COMMERCIAL

## 2019-07-03 VITALS
SYSTOLIC BLOOD PRESSURE: 219 MMHG | RESPIRATION RATE: 15 BRPM | DIASTOLIC BLOOD PRESSURE: 101 MMHG | OXYGEN SATURATION: 99 %

## 2019-07-03 VITALS
DIASTOLIC BLOOD PRESSURE: 86 MMHG | HEART RATE: 95 BPM | SYSTOLIC BLOOD PRESSURE: 171 MMHG | TEMPERATURE: 98.4 F | RESPIRATION RATE: 10 BRPM | OXYGEN SATURATION: 99 %

## 2019-07-03 PROCEDURE — 2500000003 HC RX 250 WO HCPCS: Performed by: NURSE ANESTHETIST, CERTIFIED REGISTERED

## 2019-07-03 PROCEDURE — 6360000002 HC RX W HCPCS: Performed by: NURSE ANESTHETIST, CERTIFIED REGISTERED

## 2019-07-03 PROCEDURE — 7100000001 HC PACU RECOVERY - ADDTL 15 MIN

## 2019-07-03 PROCEDURE — 2580000003 HC RX 258

## 2019-07-03 PROCEDURE — 90870 ELECTROCONVULSIVE THERAPY: CPT | Performed by: PSYCHIATRY & NEUROLOGY

## 2019-07-03 PROCEDURE — 3700000000 HC ANESTHESIA ATTENDED CARE

## 2019-07-03 PROCEDURE — 7100000000 HC PACU RECOVERY - FIRST 15 MIN

## 2019-07-03 RX ORDER — 0.9 % SODIUM CHLORIDE 0.9 %
500 INTRAVENOUS SOLUTION INTRAVENOUS ONCE
Status: COMPLETED | OUTPATIENT
Start: 2019-07-03 | End: 2019-07-03

## 2019-07-03 RX ORDER — ONDANSETRON 2 MG/ML
4 INJECTION INTRAMUSCULAR; INTRAVENOUS
Status: DISCONTINUED | OUTPATIENT
Start: 2019-07-03 | End: 2019-07-03

## 2019-07-03 RX ORDER — SODIUM CHLORIDE 0.9 % (FLUSH) 0.9 %
10 SYRINGE (ML) INJECTION EVERY 12 HOURS SCHEDULED
Status: DISCONTINUED | OUTPATIENT
Start: 2019-07-03 | End: 2019-07-03 | Stop reason: HOSPADM

## 2019-07-03 RX ORDER — SODIUM CHLORIDE 0.9 % (FLUSH) 0.9 %
10 SYRINGE (ML) INJECTION PRN
Status: DISCONTINUED | OUTPATIENT
Start: 2019-07-03 | End: 2019-07-03 | Stop reason: HOSPADM

## 2019-07-03 RX ORDER — 0.9 % SODIUM CHLORIDE 0.9 %
500 INTRAVENOUS SOLUTION INTRAVENOUS ONCE
Status: CANCELLED | OUTPATIENT
Start: 2019-07-03 | End: 2019-07-03

## 2019-07-03 RX ORDER — MEPERIDINE HYDROCHLORIDE 25 MG/ML
12.5 INJECTION INTRAMUSCULAR; INTRAVENOUS; SUBCUTANEOUS EVERY 5 MIN PRN
Status: DISCONTINUED | OUTPATIENT
Start: 2019-07-03 | End: 2019-07-03 | Stop reason: HOSPADM

## 2019-07-03 RX ORDER — SODIUM CHLORIDE 9 MG/ML
INJECTION, SOLUTION INTRAVENOUS
Status: COMPLETED
Start: 2019-07-03 | End: 2019-07-03

## 2019-07-03 RX ORDER — GLYCOPYRROLATE 1 MG/5 ML
SYRINGE (ML) INTRAVENOUS PRN
Status: DISCONTINUED | OUTPATIENT
Start: 2019-07-03 | End: 2019-07-03 | Stop reason: SDUPTHER

## 2019-07-03 RX ORDER — HYDROCODONE BITARTRATE AND ACETAMINOPHEN 5; 325 MG/1; MG/1
1 TABLET ORAL PRN
Status: DISCONTINUED | OUTPATIENT
Start: 2019-07-03 | End: 2019-07-03 | Stop reason: HOSPADM

## 2019-07-03 RX ORDER — SODIUM CHLORIDE 0.9 % (FLUSH) 0.9 %
10 SYRINGE (ML) INJECTION PRN
Status: CANCELLED | OUTPATIENT
Start: 2019-07-03

## 2019-07-03 RX ORDER — HYDROCODONE BITARTRATE AND ACETAMINOPHEN 5; 325 MG/1; MG/1
2 TABLET ORAL PRN
Status: DISCONTINUED | OUTPATIENT
Start: 2019-07-03 | End: 2019-07-03 | Stop reason: HOSPADM

## 2019-07-03 RX ORDER — DIPHENHYDRAMINE HYDROCHLORIDE 50 MG/ML
12.5 INJECTION INTRAMUSCULAR; INTRAVENOUS
Status: DISCONTINUED | OUTPATIENT
Start: 2019-07-03 | End: 2019-07-03 | Stop reason: HOSPADM

## 2019-07-03 RX ORDER — SODIUM CHLORIDE 0.9 % (FLUSH) 0.9 %
10 SYRINGE (ML) INJECTION EVERY 12 HOURS SCHEDULED
Status: CANCELLED | OUTPATIENT
Start: 2019-07-03

## 2019-07-03 RX ORDER — SUCCINYLCHOLINE/SOD CL,ISO/PF 100 MG/5ML
SYRINGE (ML) INTRAVENOUS PRN
Status: DISCONTINUED | OUTPATIENT
Start: 2019-07-03 | End: 2019-07-03 | Stop reason: SDUPTHER

## 2019-07-03 RX ORDER — FENTANYL CITRATE 50 UG/ML
50 INJECTION, SOLUTION INTRAMUSCULAR; INTRAVENOUS EVERY 10 MIN PRN
Status: DISCONTINUED | OUTPATIENT
Start: 2019-07-03 | End: 2019-07-03 | Stop reason: HOSPADM

## 2019-07-03 RX ORDER — METOCLOPRAMIDE HYDROCHLORIDE 5 MG/ML
10 INJECTION INTRAMUSCULAR; INTRAVENOUS
Status: DISCONTINUED | OUTPATIENT
Start: 2019-07-03 | End: 2019-07-03 | Stop reason: HOSPADM

## 2019-07-03 RX ADMIN — Medication 0.2 MG: at 12:29

## 2019-07-03 RX ADMIN — Medication 500 ML: at 11:30

## 2019-07-03 RX ADMIN — SODIUM CHLORIDE 500 ML: 9 INJECTION, SOLUTION INTRAVENOUS at 11:30

## 2019-07-03 RX ADMIN — Medication 60 MG: at 12:33

## 2019-07-03 RX ADMIN — METHOHEXITAL SODIUM 84 MG: 500 INJECTION, POWDER, LYOPHILIZED, FOR SOLUTION INTRAMUSCULAR; INTRAVENOUS; RECTAL at 12:33

## 2019-07-03 NOTE — ANESTHESIA PRE PROCEDURE
 Osteosarcoma ([facility] [address]     Pneumonia     [facility]     X2       Past Surgical History:        Procedure Laterality Date    BONE RESECTION, RIB      BONY  TUMOR    BONY PELVIS SURGERY  1982    CORONARY ANGIOPLASTY WITH STENT PLACEMENT      X2    CORONARY ANGIOPLASTY WITH STENT PLACEMENT      x2    KIDNEY CYST REMOVAL      benign    KIDNEY SURGERY      TUMOR AND STONE REMOVED       Social History:    Social History     Tobacco Use    Smoking status: Former Smoker     Last attempt to quit: 2006     Years since quittin.3    Smokeless tobacco: Never Used   Substance Use Topics    Alcohol use:  Yes     Alcohol/week: 0.6 oz     Types: 1 Cans of beer per week     Comment: occasional                                Counseling given: Not Answered      Vital Signs (Current):   Vitals:    19 1100 19 1103   BP: (!) 143/87    Pulse: 86 83   Resp: 22    Temp: 98.5 °F (36.9 °C)    TempSrc: Temporal    SpO2: 99%                                               BP Readings from Last 3 Encounters:   19 (!) 143/87   19 (!) 140/85   19 (!) 189/95       NPO Status: Time of last liquid consumption:                         Time of last solid consumption:                         Date of last liquid consumption: 19                        Date of last solid food consumption: 19    BMI:   Wt Readings from Last 3 Encounters:   19 248 lb (112.5 kg)   19 248 lb (112.5 kg)   06/10/19 248 lb (112.5 kg)     There is no height or weight on file to calculate BMI.    CBC:   Lab Results   Component Value Date    WBC 6.9 2019    RBC 4.71 2019    RBC 4.25 2012    HGB 13.5 2019    HCT 40.7 2019    MCV 86.4 2019    RDW 14.7 2019     2019       CMP:   Lab Results   Component Value Date     2019    K 4.1 2019     2019    CO2 27 2019    BUN 11 2019    CREATININE 0.57 2019

## 2019-07-08 ENCOUNTER — ANESTHESIA (OUTPATIENT)
Dept: POSTOP/PACU | Age: 64
End: 2019-07-08

## 2019-07-08 ENCOUNTER — ANESTHESIA EVENT (OUTPATIENT)
Dept: POSTOP/PACU | Age: 64
End: 2019-07-08

## 2019-07-08 ENCOUNTER — HOSPITAL ENCOUNTER (OUTPATIENT)
Dept: POSTOP/PACU | Age: 64
Discharge: HOME | End: 2019-07-08
Payer: COMMERCIAL

## 2019-07-08 VITALS
HEART RATE: 107 BPM | WEIGHT: 248 LBS | RESPIRATION RATE: 18 BRPM | HEIGHT: 73 IN | BODY MASS INDEX: 32.87 KG/M2 | OXYGEN SATURATION: 97 % | TEMPERATURE: 97.9 F | SYSTOLIC BLOOD PRESSURE: 131 MMHG | DIASTOLIC BLOOD PRESSURE: 85 MMHG

## 2019-07-08 VITALS
DIASTOLIC BLOOD PRESSURE: 85 MMHG | RESPIRATION RATE: 8 BRPM | SYSTOLIC BLOOD PRESSURE: 138 MMHG | OXYGEN SATURATION: 97 %

## 2019-07-08 PROCEDURE — 7100000000 HC PACU RECOVERY - FIRST 15 MIN

## 2019-07-08 PROCEDURE — 90870 ELECTROCONVULSIVE THERAPY: CPT

## 2019-07-08 PROCEDURE — 6360000002 HC RX W HCPCS: Performed by: STUDENT IN AN ORGANIZED HEALTH CARE EDUCATION/TRAINING PROGRAM

## 2019-07-08 PROCEDURE — 2500000003 HC RX 250 WO HCPCS: Performed by: STUDENT IN AN ORGANIZED HEALTH CARE EDUCATION/TRAINING PROGRAM

## 2019-07-08 PROCEDURE — 2580000003 HC RX 258: Performed by: PSYCHIATRY & NEUROLOGY

## 2019-07-08 PROCEDURE — 3700000000 HC ANESTHESIA ATTENDED CARE

## 2019-07-08 PROCEDURE — 7100000001 HC PACU RECOVERY - ADDTL 15 MIN

## 2019-07-08 PROCEDURE — 90870 ELECTROCONVULSIVE THERAPY: CPT | Performed by: PSYCHIATRY & NEUROLOGY

## 2019-07-08 PROCEDURE — 2580000003 HC RX 258: Performed by: STUDENT IN AN ORGANIZED HEALTH CARE EDUCATION/TRAINING PROGRAM

## 2019-07-08 RX ORDER — SODIUM CHLORIDE 9 MG/ML
INJECTION, SOLUTION INTRAVENOUS
Status: COMPLETED
Start: 2019-07-08 | End: 2019-07-08

## 2019-07-08 RX ORDER — SODIUM CHLORIDE 9 MG/ML
INJECTION, SOLUTION INTRAVENOUS CONTINUOUS PRN
Status: DISCONTINUED | OUTPATIENT
Start: 2019-07-08 | End: 2019-07-08 | Stop reason: SDUPTHER

## 2019-07-08 RX ORDER — GLYCOPYRROLATE 1 MG/5 ML
SYRINGE (ML) INTRAVENOUS PRN
Status: DISCONTINUED | OUTPATIENT
Start: 2019-07-08 | End: 2019-07-08 | Stop reason: SDUPTHER

## 2019-07-08 RX ORDER — SODIUM CHLORIDE 0.9 % (FLUSH) 0.9 %
10 SYRINGE (ML) INJECTION EVERY 12 HOURS SCHEDULED
Status: DISCONTINUED | OUTPATIENT
Start: 2019-07-08 | End: 2019-07-09 | Stop reason: HOSPADM

## 2019-07-08 RX ORDER — SUCCINYLCHOLINE/SOD CL,ISO/PF 100 MG/5ML
SYRINGE (ML) INTRAVENOUS PRN
Status: DISCONTINUED | OUTPATIENT
Start: 2019-07-08 | End: 2019-07-08 | Stop reason: SDUPTHER

## 2019-07-08 RX ORDER — SODIUM CHLORIDE 0.9 % (FLUSH) 0.9 %
10 SYRINGE (ML) INJECTION PRN
Status: DISCONTINUED | OUTPATIENT
Start: 2019-07-08 | End: 2019-07-09 | Stop reason: HOSPADM

## 2019-07-08 RX ORDER — 0.9 % SODIUM CHLORIDE 0.9 %
500 INTRAVENOUS SOLUTION INTRAVENOUS ONCE
Status: COMPLETED | OUTPATIENT
Start: 2019-07-08 | End: 2019-07-08

## 2019-07-08 RX ADMIN — Medication 0.2 MG: at 12:17

## 2019-07-08 RX ADMIN — SODIUM CHLORIDE: 9 INJECTION, SOLUTION INTRAVENOUS at 11:34

## 2019-07-08 RX ADMIN — Medication 40 MG: at 12:20

## 2019-07-08 RX ADMIN — METHOHEXITAL SODIUM 80 MG: 500 INJECTION, POWDER, LYOPHILIZED, FOR SOLUTION INTRAMUSCULAR; INTRAVENOUS; RECTAL at 12:19

## 2019-07-08 RX ADMIN — SODIUM CHLORIDE: 9 INJECTION, SOLUTION INTRAVENOUS at 12:00

## 2019-07-08 NOTE — OP NOTE
Department of Psychiatry  Electroconvulsive Therapy Treatment Note        7/8/2019    PURPOSE FOR ECT:  Therapeutic NUMBER: 10    DIAGNOSIS:   Active Problems:    MD[facility]major depressive disorder[facility]  Resolved Problems:    * No resolved hospital problems. *      MEDICATIONS:    Current Outpatient Medications:     baclofen (LIORESAL) 10 MG tablet, Take 10 mg by mouth as needed, Disp: , Rfl:     traZODone (DESYREL) 50 MG tablet, Take 2 tablets by mouth nightly, Disp: , Rfl:     busPIRone (BUSPAR) 15 MG tablet, Take 15 mg by mouth daily, Disp: , Rfl:     diazepam (VALIUM) 5 MG tablet, Take 5 mg by mouth nightly., Disp: , Rfl:     mirtazapine (REMERON) 30 MG tablet, Take 30 mg by mouth nightly as needed, Disp: , Rfl:     LYRICA 50 MG capsule, Take 50 mg by mouth as needed. , Disp: , Rfl:     Carbidopa-Levodopa ER (RYTARY) 48. MG CPCR, Take 48. mg by mouth 2 times daily, Disp: , Rfl:     sertraline (ZOLOFT) 100 MG tablet, Take 100 mg by mouth daily, Disp: , Rfl:     ibuprofen (ADVIL;MOTRIN) 600 MG tablet, Take 1 tablet by mouth 3 times daily as needed for Pain, Disp: 90 tablet, Rfl: 0    pravastatin (PRAVACHOL) 20 MG tablet, Take 1 tablet by mouth nightly, Disp: , Rfl: 0    SUMAtriptan (IMITREX) 50 MG tablet, Take 1 tablet by mouth daily as needed for Migraine (post ECT head ache on the day of ECT), Disp: , Rfl: 0    Apomorphine HCl (APOKYN) 30 MG/3ML SOCT, Inject 0.4 mLs into the skin every 2 hours as needed (must be given two hours apart (not to exceed a total of 5 doses a day)) Must be given 2 hours apart.  Not to exceed 5 doses per day., Disp: , Rfl:     dipyridamole-aspirin (AGGRENOX)  MG per extended release capsule, Take 1 capsule by mouth 2 times daily, Disp: , Rfl:     Menthol, Topical Analgesic, (BIOFREEZE) 4 % GEL, Apply topically 3 times daily, Disp: , Rfl:     sennosides-docusate sodium (SENOKOT-S) 8.6-50 MG tablet, Take 2 tablets by mouth nightly, Disp: ,

## 2019-07-08 NOTE — ANESTHESIA PRE PROCEDURE
Department of Anesthesiology  Preprocedure Note       Name:  Kimmie Burgos   Age:  66 y.o.  :  1955                                          MRN:  [id number]         Date:  2019      Surgeon: * No surgeons listed *    Procedure: ECT W/ ANESTHESIA    Medications prior to admission:   Prior to Admission medications    Medication Sig Start Date End Date Taking? Authorizing Provider   baclofen (LIORESAL) 10 MG tablet Take 10 mg by mouth as needed 19  Yes Historical Provider, MD   traZODone (DESYREL) 50 MG tablet Take 2 tablets by mouth nightly 19  Yes Marco Carlisle MD   busPIRone (BUSPAR) 15 MG tablet Take 15 mg by mouth daily 19  Yes Historical Provider, MD   diazepam (VALIUM) 5 MG tablet Take 5 mg by mouth nightly. 19  Yes Historical Provider, MD   mirtazapine (REMERON) 30 MG tablet Take 30 mg by mouth nightly as needed 19  Yes Historical Provider, MD   LYRICA 50 MG capsule Take 50 mg by mouth as needed. 4/3/19  Yes Historical Provider, MD   Carbidopa-Levodopa ER (RYTARY) 48. MG CPCR Take 48. mg by mouth 2 times daily 19  Yes Historical Provider, MD   sertraline (ZOLOFT) 100 MG tablet Take 100 mg by mouth daily 19  Yes Historical Provider, MD   ibuprofen (ADVIL;MOTRIN) 600 MG tablet Take 1 tablet by mouth 3 times daily as needed for Pain 19  Yes Marco Carlisle MD   pravastatin (PRAVACHOL) 20 MG tablet Take 1 tablet by mouth nightly 19  Yes Marco Carlisle MD   SUMAtriptan (IMITREX) 50 MG tablet Take 1 tablet by mouth daily as needed for Migraine (post ECT head ache on the day of ECT) 19  Yes Marco Carlisle MD   Apomorphine HCl (APOKYN) 30 MG/3ML SOCT Inject 0.4 mLs into the skin every 2 hours as needed (must be given two hours apart (not to exceed a total of 5 doses a day)) Must be given 2 hours apart. Not to exceed 5 doses per day.    Yes Historical Provider, MD   dipyridamole-aspirin (AGGRENOX)  MG per extended release capsule Take 1 capsule by mouth 2 times daily   Yes Historical Provider, MD   Menthol, Topical Analgesic, (BIOFREEZE) 4 % GEL Apply topically 3 times daily   Yes Historical Provider, MD   sennosides-docusate sodium (SENOKOT-S) 8.6-50 MG tablet Take 2 tablets by mouth nightly   Yes Historical Provider, MD   morphine-naltrexone (EMBEDA) 20-0.8 MG CPCR Take 1 capsule by mouth 2 times daily. .   Yes Historical Provider, MD   zolpidem (AMBIEN) 5 MG tablet Take 5 mg by mouth nightly as needed for Sleep. .   Yes Historical Provider, MD   tamsulosin (FLOMAX) 0.4 MG capsule Take 1 capsule by mouth daily 1/15/19  Yes Mathew Severin, MD   finasteride (PROSCAR) 5 MG tablet Take 1 tablet by mouth daily 1/15/19  Yes Mathew Severin, MD   vitamin B-12 (CYANOCOBALAMIN) 1000 MCG tablet Take 1,000 mcg by mouth daily   Yes Historical Provider, MD   rotigotine (NEUPRO) 2 MG/24HR Place 1 patch onto the skin daily Indications: taken off today for procedure    Yes Historical Provider, MD   lithium 300 MG tablet Take 150 mg by mouth 2 times daily    Yes Historical Provider, MD   cyclobenzaprine (FLEXERIL) 10 MG tablet Take 10 mg by mouth 3 times daily as needed for Muscle spasms   Yes Historical Provider, MD   acetaminophen (TYLENOL) 325 MG tablet Take 650 mg by mouth 4 times daily    Yes Historical Provider, MD   carbidopa-levodopa (SINEMET)  MG per tablet Take 1 tablet by mouth 5 times daily    Yes Historical Provider, MD   Thiamine HCl (VITAMIN B-1 PO) Take 100 mg by mouth every morning    Yes Historical Provider, MD   Cholecalciferol (VITAMIN D3) 2000 units TABS Take 2,000 Units by mouth every morning   Yes Historical Provider, MD   prochlorperazine (COMPAZINE) 10 MG tablet Take 1 tablet by mouth every 6 hours as needed for Nausea for 30 days.  9/17/12 6/14/19  Ross Chery MD       Current medications:    Current Outpatient Medications   Medication Sig Dispense Refill    baclofen (LIORESAL) 10 MG tablet Take 10 mg by mouth as needed      traZODone (DESYREL) 50 MG tablet Take 2 tablets by mouth nightly      busPIRone (BUSPAR) 15 MG tablet Take 15 mg by mouth daily      diazepam (VALIUM) 5 MG tablet Take 5 mg by mouth nightly.  mirtazapine (REMERON) 30 MG tablet Take 30 mg by mouth nightly as needed      LYRICA 50 MG capsule Take 50 mg by mouth as needed.  Carbidopa-Levodopa ER (RYTARY) 48. MG CPCR Take 48. mg by mouth 2 times daily      sertraline (ZOLOFT) 100 MG tablet Take 100 mg by mouth daily      ibuprofen (ADVIL;MOTRIN) 600 MG tablet Take 1 tablet by mouth 3 times daily as needed for Pain 90 tablet 0    pravastatin (PRAVACHOL) 20 MG tablet Take 1 tablet by mouth nightly  0    SUMAtriptan (IMITREX) 50 MG tablet Take 1 tablet by mouth daily as needed for Migraine (post ECT head ache on the day of ECT)  0    Apomorphine HCl (APOKYN) 30 MG/3ML SOCT Inject 0.4 mLs into the skin every 2 hours as needed (must be given two hours apart (not to exceed a total of 5 doses a day)) Must be given 2 hours apart. Not to exceed 5 doses per day.  dipyridamole-aspirin (AGGRENOX)  MG per extended release capsule Take 1 capsule by mouth 2 times daily      Menthol, Topical Analgesic, (BIOFREEZE) 4 % GEL Apply topically 3 times daily      sennosides-docusate sodium (SENOKOT-S) 8.6-50 MG tablet Take 2 tablets by mouth nightly      morphine-naltrexone (EMBEDA) 20-0.8 MG CPCR Take 1 capsule by mouth 2 times daily. Geeta Fore zolpidem (AMBIEN) 5 MG tablet Take 5 mg by mouth nightly as needed for Sleep. Geeta Fore tamsulosin (FLOMAX) 0.4 MG capsule Take 1 capsule by mouth daily 30 capsule 3    finasteride (PROSCAR) 5 MG tablet Take 1 tablet by mouth daily 30 tablet 3    vitamin B-12 (CYANOCOBALAMIN) 1000 MCG tablet Take 1,000 mcg by mouth daily      rotigotine (NEUPRO) 2 MG/24HR Place 1 patch onto the skin daily Indications: taken off today for procedure       lithium 300 MG tablet Take 150 mg by mouth 2 times daily       cyclobenzaprine (FLEXERIL) 10 MG tablet Take 10 mg by mouth 3 times daily as needed for Muscle spasms      acetaminophen (TYLENOL) 325 MG tablet Take 650 mg by mouth 4 times daily       carbidopa-levodopa (SINEMET)  MG per tablet Take 1 tablet by mouth 5 times daily       Thiamine HCl (VITAMIN B-1 PO) Take 100 mg by mouth every morning       Cholecalciferol (VITAMIN D3) 2000 units TABS Take 2,000 Units by mouth every morning      prochlorperazine (COMPAZINE) 10 MG tablet Take 1 tablet by mouth every 6 hours as needed for Nausea for 30 days. 30 tablet 2     Current Facility-Administered Medications   Medication Dose Route Frequency Provider Last Rate Last Dose    0.9 % sodium chloride bolus  500 mL Intravenous Once Rebecca Omalley MD        sodium chloride flush 0.9 % injection 10 mL  10 mL Intravenous 2 times per day Rebecca Omalley MD        sodium chloride flush 0.9 % injection 10 mL  10 mL Intravenous PRN Rebecca Omalley MD           Allergies:     Allergies   Allergen Reactions    Pcn [Penicillins] Hives    Penicillins     Ppd [Tuberculin Purified Protein Derivative]     Zofran [Ondansetron Hcl]        Problem List:    Patient Active Problem List   Diagnosis Code    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    Hepatitis C B19.20    Cerebral infarction ([facility] I63.9    MI (myocardial infarction) ([facility] I21.9    Left hemiplegia ([facility] G81.94    Hep C w/o coma, chronic (HCC) B18.2    Stroke ([facility] I63.9    MI (myocardial infarction) ([facility] I21.9    Leukopenia D72.819    Hepatitis C B19.20    CAD (coronary artery disease) I25.10    Pneumonia J18.9    Kidney mass N28.89    Pancytopenia ([facility] D61.818    HCAP (healthcare-associated pneumonia) J18.9    Altered mental status R41.82    Urinary retention R33.9    MDD (major depressive disorder), recurrent episode ([facility] F33.9    Severe episode of recurrent major depressive disorder, without psychotic features ([facility] F33.2    Chronic pain G89.29   

## 2019-08-16 ENCOUNTER — OFFICE VISIT (OUTPATIENT)
Dept: FAMILY MEDICINE CLINIC | Age: 64
End: 2019-08-16
Payer: COMMERCIAL

## 2019-08-16 VITALS
DIASTOLIC BLOOD PRESSURE: 86 MMHG | OXYGEN SATURATION: 97 % | RESPIRATION RATE: 14 BRPM | BODY MASS INDEX: 32.87 KG/M2 | TEMPERATURE: 97.8 F | SYSTOLIC BLOOD PRESSURE: 128 MMHG | WEIGHT: 248 LBS | HEIGHT: 73 IN | HEART RATE: 76 BPM

## 2019-08-16 DIAGNOSIS — I69.352 HEMIPARESIS OF LEFT DOMINANT SIDE AS LATE EFFECT OF CEREBRAL INFARCTION (HCC): ICD-10-CM

## 2019-08-16 DIAGNOSIS — I69.328 SPEECH AND LANGUAGE DEFICIT AS LATE EFFECT OF STROKE: ICD-10-CM

## 2019-08-16 DIAGNOSIS — I63.50 CEREBRAL INFARCTION DUE TO STENOSIS OF CEREBRAL ARTERY (HCC): ICD-10-CM

## 2019-08-16 DIAGNOSIS — N40.0 ENLARGED PROSTATE: ICD-10-CM

## 2019-08-16 DIAGNOSIS — F32.A DEPRESSION, UNSPECIFIED DEPRESSION TYPE: ICD-10-CM

## 2019-08-16 DIAGNOSIS — E53.8 VITAMIN B 12 DEFICIENCY: ICD-10-CM

## 2019-08-16 DIAGNOSIS — M21.372 FOOT DROP, LEFT FOOT: ICD-10-CM

## 2019-08-16 DIAGNOSIS — I25.118 CORONARY ARTERY DISEASE OF NATIVE HEART WITH STABLE ANGINA PECTORIS, UNSPECIFIED VESSEL OR LESION TYPE (HCC): ICD-10-CM

## 2019-08-16 DIAGNOSIS — E03.9 HYPOTHYROIDISM, UNSPECIFIED TYPE: ICD-10-CM

## 2019-08-16 DIAGNOSIS — R73.9 HYPERGLYCEMIA: ICD-10-CM

## 2019-08-16 DIAGNOSIS — E78.5 HYPERLIPIDEMIA, UNSPECIFIED HYPERLIPIDEMIA TYPE: ICD-10-CM

## 2019-08-16 DIAGNOSIS — G47.09 OTHER INSOMNIA: ICD-10-CM

## 2019-08-16 DIAGNOSIS — I25.118 CORONARY ARTERY DISEASE OF NATIVE HEART WITH STABLE ANGINA PECTORIS, UNSPECIFIED VESSEL OR LESION TYPE (HCC): Primary | ICD-10-CM

## 2019-08-16 DIAGNOSIS — M79.10 GENERALIZED MUSCLE ACHE: ICD-10-CM

## 2019-08-16 DIAGNOSIS — K59.00 CONSTIPATION, UNSPECIFIED CONSTIPATION TYPE: ICD-10-CM

## 2019-08-16 LAB
ALBUMIN SERPL-MCNC: 4.3 G/DL (ref 3.5–4.6)
ALP BLD-CCNC: 86 U/L (ref 35–104)
ALT SERPL-CCNC: <5 U/L (ref 0–41)
ANION GAP SERPL CALCULATED.3IONS-SCNC: 11 MEQ/L (ref 9–15)
AST SERPL-CCNC: 16 U/L (ref 0–40)
BASOPHILS ABSOLUTE: 0 K/UL (ref 0–0.2)
BASOPHILS RELATIVE PERCENT: 0.5 %
BILIRUB SERPL-MCNC: 0.3 MG/DL (ref 0.2–0.7)
BUN BLDV-MCNC: 12 MG/DL (ref 8–23)
CALCIUM SERPL-MCNC: 9 MG/DL (ref 8.5–9.9)
CHLORIDE BLD-SCNC: 105 MEQ/L (ref 95–107)
CHOLESTEROL, TOTAL: 176 MG/DL (ref 0–199)
CO2: 25 MEQ/L (ref 20–31)
CREAT SERPL-MCNC: 0.77 MG/DL (ref 0.7–1.2)
EOSINOPHILS ABSOLUTE: 0.2 K/UL (ref 0–0.7)
EOSINOPHILS RELATIVE PERCENT: 3.2 %
GFR AFRICAN AMERICAN: >60
GFR NON-AFRICAN AMERICAN: >60
GLOBULIN: 3.6 G/DL (ref 2.3–3.5)
GLUCOSE BLD-MCNC: 102 MG/DL (ref 70–99)
HBA1C MFR BLD: 5.7 % (ref 4.8–5.9)
HCT VFR BLD CALC: 42.4 % (ref 42–52)
HDLC SERPL-MCNC: 35 MG/DL (ref 40–59)
HEMOGLOBIN: 14.1 G/DL (ref 14–18)
LDL CHOLESTEROL CALCULATED: 112 MG/DL (ref 0–129)
LITHIUM LEVEL: 0.4 MEQ/L (ref 0.6–1.2)
LYMPHOCYTES ABSOLUTE: 0.9 K/UL (ref 1–4.8)
LYMPHOCYTES RELATIVE PERCENT: 15.6 %
MCH RBC QN AUTO: 28.2 PG (ref 27–31.3)
MCHC RBC AUTO-ENTMCNC: 33.4 % (ref 33–37)
MCV RBC AUTO: 84.5 FL (ref 80–100)
MONOCYTES ABSOLUTE: 0.6 K/UL (ref 0.2–0.8)
MONOCYTES RELATIVE PERCENT: 9.6 %
NEUTROPHILS ABSOLUTE: 4.3 K/UL (ref 1.4–6.5)
NEUTROPHILS RELATIVE PERCENT: 71.1 %
PDW BLD-RTO: 15.3 % (ref 11.5–14.5)
PLATELET # BLD: 165 K/UL (ref 130–400)
POTASSIUM SERPL-SCNC: 4.3 MEQ/L (ref 3.4–4.9)
PROSTATE SPECIFIC ANTIGEN: 0.72 NG/ML (ref 0–5.4)
RBC # BLD: 5.02 M/UL (ref 4.7–6.1)
SODIUM BLD-SCNC: 141 MEQ/L (ref 135–144)
TOTAL PROTEIN: 7.9 G/DL (ref 6.3–8)
TRIGL SERPL-MCNC: 143 MG/DL (ref 0–150)
TSH SERPL DL<=0.05 MIU/L-ACNC: 1.35 UIU/ML (ref 0.44–3.86)
VITAMIN B-12: 1520 PG/ML (ref 232–1245)
WBC # BLD: 6 K/UL (ref 4.8–10.8)

## 2019-08-16 PROCEDURE — 99213 OFFICE O/P EST LOW 20 MIN: CPT | Performed by: INTERNAL MEDICINE

## 2019-08-16 PROCEDURE — G8598 ASA/ANTIPLAT THER USED: HCPCS | Performed by: INTERNAL MEDICINE

## 2019-08-16 PROCEDURE — G8427 DOCREV CUR MEDS BY ELIG CLIN: HCPCS | Performed by: INTERNAL MEDICINE

## 2019-08-16 PROCEDURE — G8417 CALC BMI ABV UP PARAM F/U: HCPCS | Performed by: INTERNAL MEDICINE

## 2019-08-16 PROCEDURE — 1036F TOBACCO NON-USER: CPT | Performed by: INTERNAL MEDICINE

## 2019-08-16 PROCEDURE — 3017F COLORECTAL CA SCREEN DOC REV: CPT | Performed by: INTERNAL MEDICINE

## 2019-08-16 RX ORDER — LUBIPROSTONE 24 UG/1
24 CAPSULE, GELATIN COATED ORAL 2 TIMES DAILY WITH MEALS
Qty: 60 CAPSULE | Refills: 11 | Status: SHIPPED | OUTPATIENT
Start: 2019-08-16

## 2019-08-16 RX ORDER — LUBIPROSTONE 24 UG/1
24 CAPSULE, GELATIN COATED ORAL 2 TIMES DAILY WITH MEALS
COMMUNITY
End: 2019-08-16 | Stop reason: SDUPTHER

## 2019-08-16 RX ORDER — PRAVASTATIN SODIUM 20 MG
20 TABLET ORAL NIGHTLY
Qty: 30 TABLET | Refills: 11 | Status: SHIPPED | OUTPATIENT
Start: 2019-08-16

## 2019-08-16 RX ORDER — PREGABALIN 100 MG/1
100 CAPSULE ORAL 3 TIMES DAILY
Qty: 90 CAPSULE | Refills: 2 | Status: SHIPPED | OUTPATIENT
Start: 2019-08-16 | End: 2019-11-14

## 2019-08-16 RX ORDER — TRAZODONE HYDROCHLORIDE 50 MG/1
100 TABLET ORAL NIGHTLY
Qty: 60 TABLET | Refills: 5 | Status: SHIPPED | OUTPATIENT
Start: 2019-08-16

## 2019-08-16 RX ORDER — ASPIRIN AND DIPYRIDAMOLE 25; 200 MG/1; MG/1
1 CAPSULE, EXTENDED RELEASE ORAL 2 TIMES DAILY
Qty: 60 CAPSULE | Refills: 11 | Status: SHIPPED | OUTPATIENT
Start: 2019-08-16

## 2019-08-16 RX ORDER — TAMSULOSIN HYDROCHLORIDE 0.4 MG/1
0.4 CAPSULE ORAL DAILY
Qty: 30 CAPSULE | Refills: 11 | Status: SHIPPED | OUTPATIENT
Start: 2019-08-16

## 2019-08-16 RX ORDER — FINASTERIDE 5 MG/1
5 TABLET, FILM COATED ORAL DAILY
Qty: 30 TABLET | Refills: 11 | Status: SHIPPED | OUTPATIENT
Start: 2019-08-16

## 2019-08-16 RX ORDER — ASPIRIN AND DIPYRIDAMOLE 25; 200 MG/1; MG/1
1 CAPSULE, EXTENDED RELEASE ORAL 2 TIMES DAILY
COMMUNITY
End: 2019-08-16 | Stop reason: SDUPTHER

## 2019-08-16 RX ORDER — MORPHINE SULFATE 15 MG/1
TABLET, FILM COATED, EXTENDED RELEASE ORAL
Refills: 0 | Status: ON HOLD | COMMUNITY
Start: 2019-08-12 | End: 2019-08-28 | Stop reason: SDUPTHER

## 2019-08-16 NOTE — PROGRESS NOTES
improvement of lipids. Other   This is a recurrent (depression) problem. The current episode started more than 1 year ago. The problem occurs daily. The problem has been waxing and waning. Associated symptoms include abdominal pain, arthralgias, chest pain, fatigue, headaches, myalgias and weakness. Pertinent negatives include no fever, nausea, rash or vomiting. The symptoms are aggravated by stress. Past Medical History:   Diagnosis Date    CAD (coronary artery disease)     CVA (cerebral infarction) 2007, 2008    x2, left side weakness    Foot drop, left     Hemiplegia as late effect of cerebrovascular accident ([address] 2012    This is heavily debated. MRI shows small vessel CVD and nothing to suggest significant prior stroke to leave hemiplegia. Neurology has suggested malingering.     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[address]     Parkinson disease ([address]     dr delaney [facility] Pneumonia     Stroke [facility])     X2     Patient Active Problem List    Diagnosis Date Noted    Chronic pain 06/03/2019    Generalized muscle ache 06/03/2019    Muscle spasticity 06/03/2019    Parkinson disease, symptomatic ([address] 06/03/2019    MDD (major depressive disorder), recurrent episode ([address] 05/31/2019    Severe episode of recurrent major depressive disorder, without psychotic features ([address] 05/31/2019    Altered mental status     Urinary retention     HCAP (healthcare-associated pneumonia) 01/11/2019    Pancytopenia ([address] 01/21/2013    Hep C w/o coma, chronic ([address] 07/30/2012    Left hemiplegia ([address] 03/12/2012    Osteosarcoma ([address]     Hepatitis C     Cerebral infarction ([address]     MI (myocardial infarction) ([address]     Stroke ([address]     MI (myocardial infarction) ([address]     Leukopenia     Hepatitis C     CAD (coronary artery Concern    None   Social History Narrative         Type of Home: Facility    Home Equipment: Wheelchair-electric    ADL Assistance: Needs assistance    Ambulation Assistance: Needs assistance (pt reports he walks with therapy staff at [facility] +2 with no device)    Transfer Assistance: Independent               Allergies   Allergen Reactions    Pcn [Penicillins] Hives    Penicillins     Ppd [Tuberculin Purified Protein Derivative]     Zofran [Ondansetron Hcl]        Review of Systems   Constitutional: Positive for fatigue. Negative for fever. HENT: Negative for trouble swallowing and voice change. Eyes: Negative for photophobia and visual disturbance. Respiratory: Negative for choking and shortness of breath. Cardiovascular: Positive for chest pain. Negative for palpitations. Gastrointestinal: Positive for abdominal pain and constipation. Negative for melena, nausea and vomiting. Genitourinary: Negative for decreased urine volume, difficulty urinating, testicular pain and urgency. Musculoskeletal: Positive for arthralgias, gait problem and myalgias. Skin: Negative for rash. Neurological: Positive for focal weakness, weakness, light-headedness and headaches. Negative for tremors and syncope. Hematological: Does not bruise/bleed easily. Psychiatric/Behavioral: Positive for agitation, confusion, decreased concentration and sleep disturbance. Negative for suicidal ideas. The patient is nervous/anxious. Vitals:    08/16/19 1431   BP: 128/86   Pulse: 76   Resp: 14   Temp: 97.8 °F (36.6 °C)   SpO2: 97%   Weight: 248 lb (112.5 kg)   Height: 6' 1\" (1.854 m)       Physical Exam   Constitutional: He appears well-developed and well-nourished. HENT:   Head: Normocephalic and atraumatic. Eyes: Pupils are equal, round, and reactive to light. Conjunctivae and EOM are normal.   Neck: Normal range of motion. No thyromegaly present. Cardiovascular: Normal rate and regular rhythm. Pulmonary/Chest: Effort normal. No respiratory distress. He has no wheezes. Abdominal: Soft. Bowel sounds are normal. He exhibits no distension. Musculoskeletal: Normal range of motion. Neurological: He is alert. A cranial nerve deficit is present. Coordination abnormal.   Skin: Skin is dry. Psychiatric: He has a normal mood and affect. Assessment/Plan  Linda Rucker was seen today for [facility] care. Diagnoses and all orders for this visit:    Coronary artery disease of native heart with stable angina pectoris, unspecified vessel or lesion type ([facility])  -     aspirin-dipyridamole (AGGRENOX)  MG per extended release capsule; Take 1 capsule by mouth 2 times daily  -     CBC With Auto Differential; Future    Hyperlipidemia, unspecified hyperlipidemia type  -     pravastatin (PRAVACHOL) 20 MG tablet; Take 1 tablet by mouth nightly  -     Lipid Panel; Future    Cerebral infarction due to stenosis of cerebral artery ([facility]  -     Amb External Referral To Home Health  -     DME Order for Hospital Bed as OP    Constipation, unspecified constipation type  -     lubiprostone (AMITIZA) 24 MCG capsule; Take 1 capsule by mouth 2 times daily (with meals)    Depression, unspecified depression type  -     TSH Without Reflex; Future  -     Lithium Level; Future  -     Amb External Referral To Home Health    Generalized muscle ache  -     pregabalin (LYRICA) 100 MG capsule; Take 1 capsule by mouth 3 times daily for 90 days. -     CBC With Auto Differential; Future  -     Amb External Referral To Home Health  -     DME Order for Hospital Bed as OP    Other insomnia  -     traZODone (DESYREL) 50 MG tablet; Take 2 tablets by mouth nightly    Enlarged prostate  -     finasteride (PROSCAR) 5 MG tablet; Take 1 tablet by mouth daily  -     tamsulosin (FLOMAX) 0.4 MG capsule; Take 1 capsule by mouth daily  -     PSA Screening;  Future    Vitamin B 12 deficiency  -     Vitamin B12; Future    Hyperglycemia  -     Hemoglobin

## 2019-08-21 ENCOUNTER — TELEPHONE (OUTPATIENT)
Dept: FAMILY MEDICINE CLINIC | Age: 64
End: 2019-08-21

## 2019-08-21 NOTE — TELEPHONE ENCOUNTER
Please finish office notes for patient from his visit on 8/16/19 in order to send his referral to [facility] and send the medical supply company his order for a hospital bed. His insurance would need notes in order for them to pay services for patient.  Thank you:)

## 2019-08-23 ENCOUNTER — TELEPHONE (OUTPATIENT)
Dept: FAMILY MEDICINE CLINIC | Age: 64
End: 2019-08-23

## 2019-08-25 ASSESSMENT — ENCOUNTER SYMPTOMS
SHORTNESS OF BREATH: 0
VOMITING: 0
CONSTIPATION: 1
TROUBLE SWALLOWING: 0
ABDOMINAL PAIN: 1
NAUSEA: 0
PHOTOPHOBIA: 0
CHOKING: 0
VOICE CHANGE: 0

## 2019-08-27 ENCOUNTER — HOSPITAL ENCOUNTER (OUTPATIENT)
Age: 64
Setting detail: OBSERVATION
Discharge: NURSING, REHAB OR LONG-TERM CARE | End: 2019-08-28
Attending: STUDENT IN AN ORGANIZED HEALTH CARE EDUCATION/TRAINING PROGRAM | Admitting: INTERNAL MEDICINE
Payer: COMMERCIAL

## 2019-08-27 ENCOUNTER — APPOINTMENT (OUTPATIENT)
Dept: GENERAL RADIOLOGY | Age: 64
End: 2019-08-27
Payer: COMMERCIAL

## 2019-08-27 ENCOUNTER — APPOINTMENT (OUTPATIENT)
Dept: CT IMAGING | Age: 64
End: 2019-08-27
Payer: COMMERCIAL

## 2019-08-27 DIAGNOSIS — J18.9 ATYPICAL PNEUMONIA: Primary | ICD-10-CM

## 2019-08-27 DIAGNOSIS — M62.838 MUSCLE SPASTICITY: ICD-10-CM

## 2019-08-27 DIAGNOSIS — H53.40 VISUAL FIELD DEFECT: ICD-10-CM

## 2019-08-27 DIAGNOSIS — Z86.69 HISTORY OF PARKINSON'S DISEASE: ICD-10-CM

## 2019-08-27 PROBLEM — R53.1 GENERALIZED WEAKNESS: Status: ACTIVE | Noted: 2019-08-27

## 2019-08-27 LAB
ACETAMINOPHEN LEVEL: <5 UG/ML (ref 10–30)
ALBUMIN SERPL-MCNC: 4.4 G/DL (ref 3.5–4.6)
ALP BLD-CCNC: 93 U/L (ref 35–104)
ALT SERPL-CCNC: <5 U/L (ref 0–41)
AMMONIA: 28 UMOL/L (ref 16–60)
AMPHETAMINE SCREEN, URINE: ABNORMAL
ANION GAP SERPL CALCULATED.3IONS-SCNC: 13 MEQ/L (ref 9–15)
AST SERPL-CCNC: 16 U/L (ref 0–40)
BARBITURATE SCREEN URINE: ABNORMAL
BASOPHILS ABSOLUTE: 0 K/UL (ref 0–0.2)
BASOPHILS RELATIVE PERCENT: 0.5 %
BENZODIAZEPINE SCREEN, URINE: ABNORMAL
BILIRUB SERPL-MCNC: 0.5 MG/DL (ref 0.2–0.7)
BILIRUBIN URINE: NEGATIVE
BLOOD, URINE: NEGATIVE
BUN BLDV-MCNC: 9 MG/DL (ref 8–23)
CALCIUM SERPL-MCNC: 9.2 MG/DL (ref 8.5–9.9)
CANNABINOID SCREEN URINE: ABNORMAL
CHLORIDE BLD-SCNC: 99 MEQ/L (ref 95–107)
CLARITY: CLEAR
CO2: 24 MEQ/L (ref 20–31)
COCAINE METABOLITE SCREEN URINE: ABNORMAL
COLOR: YELLOW
CREAT SERPL-MCNC: 0.84 MG/DL (ref 0.7–1.2)
EKG ATRIAL RATE: 66 BPM
EKG P AXIS: -13 DEGREES
EKG P-R INTERVAL: 140 MS
EKG Q-T INTERVAL: 440 MS
EKG QRS DURATION: 70 MS
EKG QTC CALCULATION (BAZETT): 461 MS
EKG R AXIS: 16 DEGREES
EKG T AXIS: 37 DEGREES
EKG VENTRICULAR RATE: 66 BPM
EOSINOPHILS ABSOLUTE: 0.3 K/UL (ref 0–0.7)
EOSINOPHILS RELATIVE PERCENT: 4.6 %
ETHANOL PERCENT: NORMAL G/DL
ETHANOL: <10 MG/DL (ref 0–0.08)
GFR AFRICAN AMERICAN: >60
GFR NON-AFRICAN AMERICAN: >60
GLOBULIN: 4 G/DL (ref 2.3–3.5)
GLUCOSE BLD-MCNC: 110 MG/DL (ref 70–99)
GLUCOSE URINE: NEGATIVE MG/DL
HCT VFR BLD CALC: 45.6 % (ref 42–52)
HEMOGLOBIN: 14.7 G/DL (ref 14–18)
KETONES, URINE: NEGATIVE MG/DL
LEUKOCYTE ESTERASE, URINE: NEGATIVE
LITHIUM LEVEL: 0.5 MEQ/L (ref 0.6–1.2)
LYMPHOCYTES ABSOLUTE: 1.3 K/UL (ref 1–4.8)
LYMPHOCYTES RELATIVE PERCENT: 19.5 %
Lab: ABNORMAL
MAGNESIUM: 2.4 MG/DL (ref 1.7–2.4)
MCH RBC QN AUTO: 27.6 PG (ref 27–31.3)
MCHC RBC AUTO-ENTMCNC: 32.3 % (ref 33–37)
MCV RBC AUTO: 85.5 FL (ref 80–100)
MONOCYTES ABSOLUTE: 0.6 K/UL (ref 0.2–0.8)
MONOCYTES RELATIVE PERCENT: 9.2 %
NEUTROPHILS ABSOLUTE: 4.5 K/UL (ref 1.4–6.5)
NEUTROPHILS RELATIVE PERCENT: 66.2 %
NITRITE, URINE: NEGATIVE
OPIATE SCREEN URINE: POSITIVE
PDW BLD-RTO: 15.7 % (ref 11.5–14.5)
PH UA: 5.5 (ref 5–9)
PHENCYCLIDINE SCREEN URINE: ABNORMAL
PLATELET # BLD: 151 K/UL (ref 130–400)
POTASSIUM SERPL-SCNC: 4.6 MEQ/L (ref 3.4–4.9)
PROCALCITONIN: 0.07 NG/ML (ref 0–0.15)
PROTEIN UA: NEGATIVE MG/DL
RBC # BLD: 5.33 M/UL (ref 4.7–6.1)
REASON FOR REJECTION: NORMAL
REJECTED TEST: NORMAL
SALICYLATE, SERUM: <0.3 MG/DL (ref 15–30)
SODIUM BLD-SCNC: 136 MEQ/L (ref 135–144)
SPECIFIC GRAVITY UA: 1.01 (ref 1–1.03)
TOTAL CK: 57 U/L (ref 0–190)
TOTAL PROTEIN: 8.4 G/DL (ref 6.3–8)
TROPONIN: <0.01 NG/ML (ref 0–0.01)
TSH SERPL DL<=0.05 MIU/L-ACNC: 1.81 UIU/ML (ref 0.44–3.86)
URINE REFLEX TO CULTURE: NORMAL
UROBILINOGEN, URINE: 0.2 E.U./DL
WBC # BLD: 6.8 K/UL (ref 4.8–10.8)

## 2019-08-27 PROCEDURE — 85025 COMPLETE CBC W/AUTO DIFF WBC: CPT

## 2019-08-27 PROCEDURE — 81003 URINALYSIS AUTO W/O SCOPE: CPT

## 2019-08-27 PROCEDURE — 6360000002 HC RX W HCPCS: Performed by: INTERNAL MEDICINE

## 2019-08-27 PROCEDURE — 93005 ELECTROCARDIOGRAM TRACING: CPT | Performed by: STUDENT IN AN ORGANIZED HEALTH CARE EDUCATION/TRAINING PROGRAM

## 2019-08-27 PROCEDURE — 80053 COMPREHEN METABOLIC PANEL: CPT

## 2019-08-27 PROCEDURE — 87040 BLOOD CULTURE FOR BACTERIA: CPT

## 2019-08-27 PROCEDURE — 82140 ASSAY OF AMMONIA: CPT

## 2019-08-27 PROCEDURE — G0480 DRUG TEST DEF 1-7 CLASSES: HCPCS

## 2019-08-27 PROCEDURE — 83735 ASSAY OF MAGNESIUM: CPT

## 2019-08-27 PROCEDURE — 2580000003 HC RX 258: Performed by: INTERNAL MEDICINE

## 2019-08-27 PROCEDURE — 6370000000 HC RX 637 (ALT 250 FOR IP): Performed by: STUDENT IN AN ORGANIZED HEALTH CARE EDUCATION/TRAINING PROGRAM

## 2019-08-27 PROCEDURE — 6360000002 HC RX W HCPCS: Performed by: PHYSICIAN ASSISTANT

## 2019-08-27 PROCEDURE — 70450 CT HEAD/BRAIN W/O DYE: CPT

## 2019-08-27 PROCEDURE — 80178 ASSAY OF LITHIUM: CPT

## 2019-08-27 PROCEDURE — 96372 THER/PROPH/DIAG INJ SC/IM: CPT

## 2019-08-27 PROCEDURE — G0378 HOSPITAL OBSERVATION PER HR: HCPCS

## 2019-08-27 PROCEDURE — 84443 ASSAY THYROID STIM HORMONE: CPT

## 2019-08-27 PROCEDURE — 99285 EMERGENCY DEPT VISIT HI MDM: CPT

## 2019-08-27 PROCEDURE — 84484 ASSAY OF TROPONIN QUANT: CPT

## 2019-08-27 PROCEDURE — 97166 OT EVAL MOD COMPLEX 45 MIN: CPT

## 2019-08-27 PROCEDURE — 82550 ASSAY OF CK (CPK): CPT

## 2019-08-27 PROCEDURE — 36415 COLL VENOUS BLD VENIPUNCTURE: CPT

## 2019-08-27 PROCEDURE — 6370000000 HC RX 637 (ALT 250 FOR IP): Performed by: INTERNAL MEDICINE

## 2019-08-27 PROCEDURE — 71045 X-RAY EXAM CHEST 1 VIEW: CPT

## 2019-08-27 PROCEDURE — 96365 THER/PROPH/DIAG IV INF INIT: CPT

## 2019-08-27 PROCEDURE — 96366 THER/PROPH/DIAG IV INF ADDON: CPT

## 2019-08-27 PROCEDURE — 84145 PROCALCITONIN (PCT): CPT

## 2019-08-27 PROCEDURE — 97162 PT EVAL MOD COMPLEX 30 MIN: CPT

## 2019-08-27 PROCEDURE — 80307 DRUG TEST PRSMV CHEM ANLYZR: CPT

## 2019-08-27 PROCEDURE — 6360000002 HC RX W HCPCS: Performed by: STUDENT IN AN ORGANIZED HEALTH CARE EDUCATION/TRAINING PROGRAM

## 2019-08-27 RX ORDER — APOMORPHINE HYDROCHLORIDE 30 MG/3ML
0.4 INJECTION SUBCUTANEOUS
Status: DISCONTINUED | OUTPATIENT
Start: 2019-08-27 | End: 2019-08-28 | Stop reason: HOSPADM

## 2019-08-27 RX ORDER — PROMETHAZINE HYDROCHLORIDE 25 MG/ML
25 INJECTION, SOLUTION INTRAMUSCULAR; INTRAVENOUS ONCE
Status: COMPLETED | OUTPATIENT
Start: 2019-08-27 | End: 2019-08-27

## 2019-08-27 RX ORDER — LEVOFLOXACIN 5 MG/ML
750 INJECTION, SOLUTION INTRAVENOUS ONCE
Status: COMPLETED | OUTPATIENT
Start: 2019-08-27 | End: 2019-08-27

## 2019-08-27 RX ORDER — SODIUM CHLORIDE 0.9 % (FLUSH) 0.9 %
10 SYRINGE (ML) INJECTION EVERY 12 HOURS SCHEDULED
Status: DISCONTINUED | OUTPATIENT
Start: 2019-08-27 | End: 2019-08-28 | Stop reason: HOSPADM

## 2019-08-27 RX ORDER — PRAVASTATIN SODIUM 10 MG
20 TABLET ORAL NIGHTLY
Status: DISCONTINUED | OUTPATIENT
Start: 2019-08-27 | End: 2019-08-28 | Stop reason: HOSPADM

## 2019-08-27 RX ORDER — MORPHINE SULFATE 15 MG/1
15 TABLET, FILM COATED, EXTENDED RELEASE ORAL 2 TIMES DAILY PRN
Status: DISCONTINUED | OUTPATIENT
Start: 2019-08-27 | End: 2019-08-28 | Stop reason: HOSPADM

## 2019-08-27 RX ORDER — SODIUM CHLORIDE 0.9 % (FLUSH) 0.9 %
10 SYRINGE (ML) INJECTION PRN
Status: DISCONTINUED | OUTPATIENT
Start: 2019-08-27 | End: 2019-08-28 | Stop reason: HOSPADM

## 2019-08-27 RX ORDER — LUBIPROSTONE 24 UG/1
24 CAPSULE, GELATIN COATED ORAL 2 TIMES DAILY WITH MEALS
Status: DISCONTINUED | OUTPATIENT
Start: 2019-08-27 | End: 2019-08-28 | Stop reason: HOSPADM

## 2019-08-27 RX ORDER — ASPIRIN 81 MG/1
81 TABLET, CHEWABLE ORAL ONCE
Status: COMPLETED | OUTPATIENT
Start: 2019-08-27 | End: 2019-08-27

## 2019-08-27 RX ORDER — ACETAMINOPHEN 325 MG/1
650 TABLET ORAL 4 TIMES DAILY
Status: DISCONTINUED | OUTPATIENT
Start: 2019-08-27 | End: 2019-08-28 | Stop reason: HOSPADM

## 2019-08-27 RX ORDER — ASPIRIN AND DIPYRIDAMOLE 25; 200 MG/1; MG/1
1 CAPSULE, EXTENDED RELEASE ORAL 2 TIMES DAILY
Status: DISCONTINUED | OUTPATIENT
Start: 2019-08-27 | End: 2019-08-28 | Stop reason: HOSPADM

## 2019-08-27 RX ORDER — BACLOFEN 10 MG/1
10 TABLET ORAL 2 TIMES DAILY
Status: DISCONTINUED | OUTPATIENT
Start: 2019-08-27 | End: 2019-08-28 | Stop reason: HOSPADM

## 2019-08-27 RX ORDER — APOMORPHINE HYDROCHLORIDE 30 MG/3ML
0.4 INJECTION SUBCUTANEOUS
COMMUNITY

## 2019-08-27 RX ORDER — TRAZODONE HYDROCHLORIDE 50 MG/1
100 TABLET ORAL NIGHTLY
Status: DISCONTINUED | OUTPATIENT
Start: 2019-08-27 | End: 2019-08-28 | Stop reason: HOSPADM

## 2019-08-27 RX ORDER — FINASTERIDE 5 MG/1
5 TABLET, FILM COATED ORAL DAILY
Status: DISCONTINUED | OUTPATIENT
Start: 2019-08-27 | End: 2019-08-28 | Stop reason: HOSPADM

## 2019-08-27 RX ORDER — TAMSULOSIN HYDROCHLORIDE 0.4 MG/1
0.4 CAPSULE ORAL DAILY
Status: DISCONTINUED | OUTPATIENT
Start: 2019-08-27 | End: 2019-08-28 | Stop reason: HOSPADM

## 2019-08-27 RX ORDER — PREGABALIN 100 MG/1
100 CAPSULE ORAL 3 TIMES DAILY
Status: DISCONTINUED | OUTPATIENT
Start: 2019-08-27 | End: 2019-08-28 | Stop reason: HOSPADM

## 2019-08-27 RX ORDER — LITHIUM CARBONATE 150 MG/1
150 CAPSULE ORAL 2 TIMES DAILY
Status: DISCONTINUED | OUTPATIENT
Start: 2019-08-27 | End: 2019-08-28 | Stop reason: HOSPADM

## 2019-08-27 RX ADMIN — ROTIGOTINE 1 PATCH: 2 PATCH, EXTENDED RELEASE TRANSDERMAL at 21:27

## 2019-08-27 RX ADMIN — Medication 10 ML: at 21:30

## 2019-08-27 RX ADMIN — ENOXAPARIN SODIUM 40 MG: 40 INJECTION SUBCUTANEOUS at 18:37

## 2019-08-27 RX ADMIN — LEVOFLOXACIN 750 MG: 5 INJECTION, SOLUTION INTRAVENOUS at 14:40

## 2019-08-27 RX ADMIN — TAMSULOSIN HYDROCHLORIDE 0.4 MG: 0.4 CAPSULE ORAL at 21:27

## 2019-08-27 RX ADMIN — LITHIUM CARBONATE 150 MG: 150 CAPSULE, GELATIN COATED ORAL at 21:20

## 2019-08-27 RX ADMIN — TRAZODONE HYDROCHLORIDE 100 MG: 50 TABLET ORAL at 21:20

## 2019-08-27 RX ADMIN — APOMORPHINE HYDROCHLORIDE 0.4 ML: 30 INJECTION SUBCUTANEOUS at 18:37

## 2019-08-27 RX ADMIN — MIRTAZAPINE 45 MG: 30 TABLET, FILM COATED ORAL at 21:20

## 2019-08-27 RX ADMIN — MORPHINE SULFATE 15 MG: 15 TABLET, FILM COATED, EXTENDED RELEASE ORAL at 21:20

## 2019-08-27 RX ADMIN — PRAVASTATIN SODIUM 20 MG: 10 TABLET ORAL at 21:21

## 2019-08-27 RX ADMIN — CARBIDOPA AND LEVODOPA 1 TABLET: 25; 100 TABLET ORAL at 21:21

## 2019-08-27 RX ADMIN — ASPIRIN 81 MG 81 MG: 81 TABLET ORAL at 14:40

## 2019-08-27 RX ADMIN — BACLOFEN 10 MG: 10 TABLET ORAL at 21:21

## 2019-08-27 RX ADMIN — ASPIRIN AND DIPYRIDAMOLE 1 CAPSULE: 25; 200 CAPSULE, EXTENDED RELEASE ORAL at 21:21

## 2019-08-27 RX ADMIN — PROMETHAZINE HYDROCHLORIDE 25 MG: 25 INJECTION, SOLUTION INTRAMUSCULAR; INTRAVENOUS at 14:40

## 2019-08-27 RX ADMIN — PREGABALIN 100 MG: 100 CAPSULE ORAL at 21:20

## 2019-08-27 ASSESSMENT — PAIN SCALES - GENERAL
PAINLEVEL_OUTOF10: 7
PAINLEVEL_OUTOF10: 0
PAINLEVEL_OUTOF10: 7
PAINLEVEL_OUTOF10: 8

## 2019-08-27 ASSESSMENT — PAIN DESCRIPTION - DESCRIPTORS: DESCRIPTORS: CRAMPING;ACHING

## 2019-08-27 ASSESSMENT — ENCOUNTER SYMPTOMS
SHORTNESS OF BREATH: 0
CHEST TIGHTNESS: 0
ABDOMINAL PAIN: 0
VOMITING: 0
BACK PAIN: 0
DIARRHEA: 0
SINUS PRESSURE: 0
COUGH: 1
TROUBLE SWALLOWING: 0

## 2019-08-27 ASSESSMENT — PAIN DESCRIPTION - ORIENTATION
ORIENTATION: RIGHT;LEFT
ORIENTATION: RIGHT;LEFT

## 2019-08-27 ASSESSMENT — PAIN DESCRIPTION - LOCATION
LOCATION: SHOULDER;LEG
LOCATION: LEG;SHOULDER
LOCATION: GENERALIZED
LOCATION: SHOULDER;LEG;GENERALIZED

## 2019-08-27 ASSESSMENT — PAIN DESCRIPTION - PAIN TYPE
TYPE: CHRONIC PAIN

## 2019-08-27 NOTE — ED PROVIDER NOTES
and headaches. Hematological: Does not bruise/bleed easily. All other systems reviewed and are negative. Except as noted above the remainder of the review of systems was reviewed and negative. PAST MEDICAL HISTORY     Past Medical History:   Diagnosis Date    CAD (coronary artery disease)     CVA (cerebral infarction) 2007, 2008    x2, left side weakness    Foot drop, left     Hemiplegia as late effect of cerebrovascular accident ([address] 2012    This is heavily debated. MRI shows small vessel CVD and nothing to suggest significant prior stroke to leave hemiplegia. Neurology has suggested malingering. 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HCC)     Parkinson disease ([facility]     dr delaney Briones Pneumonia     Stroke ([facility]     X2         SURGICALHISTORY       Past Surgical History:   Procedure Laterality Date    BONE RESECTION, RIB      BONY  TUMOR    BONY PELVIS SURGERY  1982    CORONARY ANGIOPLASTY WITH STENT PLACEMENT      X2    CORONARY ANGIOPLASTY WITH STENT PLACEMENT      x2    KIDNEY CYST REMOVAL      benign    KIDNEY SURGERY      TUMOR AND STONE REMOVED         CURRENT MEDICATIONS       Previous Medications    ACETAMINOPHEN (TYLENOL) 325 MG TABLET    Take 650 mg by mouth 4 times daily     APOMORPHINE HCL (APOKYN) 30 MG/3ML SOCT    Inject 0.4 mLs into the skin every 2 hours as needed (must be given two hours apart (not to exceed a total of 5 doses a day)) Must be given 2 hours apart. Not to exceed 5 doses per day.     ASPIRIN-DIPYRIDAMOLE (AGGRENOX)  MG PER EXTENDED RELEASE CAPSULE    Take 1 capsule by mouth 2 times daily    BACLOFEN (LIORESAL) 10 MG TABLET    Take 10 mg by mouth as needed    CARBIDOPA-LEVODOPA (SINEMET)  MG PER TABLET    Take 1 tablet by mouth 5 times daily     CARBIDOPA-LEVODOPA Alcohol use: Yes     Alcohol/week: 1.0 standard drinks     Types: 1 Cans of beer per week     Comment: occasional    Drug use: No    Sexual activity: Yes     Partners: Female   Lifestyle    Physical activity:     Days per week: None     Minutes per session: None    Stress: None   Relationships    Social connections:     Talks on phone: None     Gets together: None     Attends [employer] service: None     Active member of club or organization: None     Attends meetings of clubs or organizations: None     Relationship status: None    Intimate partner violence:     Fear of current or ex partner: None     Emotionally abused: None     Physically abused: None     Forced sexual activity: None   Other Topics Concern    None   Social History Narrative         Type of Home: Facility    Home Equipment: Wheelchair-electric    ADL Assistance: Needs assistance    Ambulation Assistance: Needs assistance (pt reports he walks with therapy staff at [facility] +2 with no device)    Transfer Assistance: Independent                 SCREENINGS    [place] Coma Scale  Eye Opening: Spontaneous  Best Verbal Response: Oriented  Best Motor Response: Obeys commands  Elena Coma Scale Score: 15 @FLOW([id number])@      PHYSICAL EXAM    (up to 7 for level 4, 8 or more for level 5)     ED Triage Vitals [08/27/19 1119]   BP Temp Temp Source Pulse Resp SpO2 Height Weight   137/79 98.6 °F (37 °C) Oral 77 16 99 % 6' 1\" (1.854 m) 240 lb (108.9 kg)       Physical Exam   Constitutional: He appears well-developed and well-nourished. No distress. No photophobia. No phonophobia. HENT:   Head: Normocephalic and atraumatic. Head is without Thomas's sign. Right Ear: External ear normal.   Left Ear: External ear normal.   Nose: Nose normal.   Mouth/Throat: Oropharynx is clear and moist. No oropharyngeal exudate. Eyes: Pupils are equal, round, and reactive to light. Conjunctivae and EOM are normal. No foreign body present in the right eye.  Left eye DEPARTMENT COURSE and DIFFERENTIAL DIAGNOSIS/MDM:   Vitals:    Vitals:    08/27/19 1119   BP: 137/79   Pulse: 77   Resp: 16   Temp: 98.6 °F (37 °C)   TempSrc: Oral   SpO2: 99%   Weight: 240 lb (108.9 kg)   Height: 6' 1\" (1.854 m)           MDM  Patient came to the ER seeking placement for the nursing home. He was found to have a new left-sided pneumonia and also has a new visual field deficit to the left superior temporal and right superior nasal visual fields. Neurology consult was obtained with Dr. Rae Aldridge and he recommends bringing the patient into the hospital for further work-up for the visual field deficits. Patient has a pneumonia he only has an occasional cough it is not all day long and it is not productive. I believe this to be an atypical pneumonia he is penicillin allergic and be treated with Levaquin after blood cultures x2. PT and OT eval were done not knowing that the patient has pneumonia. Aspirin was ordered patient already takes Aggrenox but a higher dose for cerebrovascular protection due to the visual field deficits. Neurology has already been consulted and Dr. Rae Aldridge will follow the patient. ER Physician spoke with Dr. Layla De Anda, via teleconference; who is accepted the patient to the hospitalist service. CONSULTS:  IP CONSULT TO NEUROLOGY  IP CONSULT TO HOSPITALIST    PROCEDURES:  Unless otherwise noted below, none     Procedures    FINAL IMPRESSION      1. Atypical pneumonia    2. Visual field defect    3.  History of Parkinson's disease          DISPOSITION/PLAN   DISPOSITION Admitted 08/27/2019 01:53:55 PM      PATIENT REFERRED TO:  Mary Sears MD  [address] [facility] [address] [phone]            DISCHARGE MEDICATIONS:  New Prescriptions    No medications on file          (Please note that portions of this note were completed with a voice recognition program.  Efforts were made to edit the dictations but occasionally words are mis-transcribed.)    [address] Bower,

## 2019-08-27 NOTE — ED NOTES
Patient to take home medications, Dr. Dulce Hannon aware     Lissett Worthington, VASILE  08/27/19 6756

## 2019-08-27 NOTE — PLAN OF CARE
See OT evaluation for all goals and OT POC.  Electronically signed by JACKIE Prasad/L on 8/27/2019 at 1:44 PM

## 2019-08-27 NOTE — PROGRESS NOTES
within reach     [facility] (6 CLICK) [address] Mobility Raw Score : 11     Therapy Time   Individual   Time In 1305   Time Out 1325   Minutes 20             Raya Nai, [address] [facility], 08/27/19 at 1:45 PM

## 2019-08-27 NOTE — CARE COORDINATION
Met with pt regarding his current needs. He states that he lives at home with his wife at this time but that he is having worsening symptoms of the parkinsons disease which he has. He has h/o cva's as well. He states that he had been at [facility] for 2 yrs, 3 weeks ago was d/c states \"I wanted to see if I could make it on my own,\" pt states he was seen by Dr Eddie Valderrama and he stated that pt needs \"my medications adjusted and physical therapy. \" he states that Dr Eddie Valderrama and himself have been in contact with [facility] stating Jelena Quigley said it was ok I just needed to go to the ER first.\" He states that the [facility] home told him Jelena Quigley even have my old room ready. \" I informed them that he would need a physical assessment from the ER Dr and a pt/ot evaluation. I informed them that we would need to coordinate with [facility] and the [facility] home and that it may or may not be possible to get this done today depending on his physical assessment and insurance approval. Pt states understanding. I called Alexey soto and informed her of this and she will assist as needed with this process.  I paged PT therapist.

## 2019-08-28 VITALS
HEIGHT: 73 IN | DIASTOLIC BLOOD PRESSURE: 62 MMHG | WEIGHT: 240 LBS | RESPIRATION RATE: 15 BRPM | OXYGEN SATURATION: 97 % | TEMPERATURE: 98 F | HEART RATE: 58 BPM | BODY MASS INDEX: 31.81 KG/M2 | SYSTOLIC BLOOD PRESSURE: 118 MMHG

## 2019-08-28 LAB
GLUCOSE BLD-MCNC: 104 MG/DL (ref 60–115)
GLUCOSE BLD-MCNC: 131 MG/DL (ref 60–115)
GLUCOSE BLD-MCNC: 136 MG/DL (ref 60–115)
PERFORMED ON: ABNORMAL
PERFORMED ON: ABNORMAL
PERFORMED ON: NORMAL

## 2019-08-28 PROCEDURE — G0378 HOSPITAL OBSERVATION PER HR: HCPCS

## 2019-08-28 PROCEDURE — 97116 GAIT TRAINING THERAPY: CPT

## 2019-08-28 PROCEDURE — 6370000000 HC RX 637 (ALT 250 FOR IP): Performed by: INTERNAL MEDICINE

## 2019-08-28 PROCEDURE — 96372 THER/PROPH/DIAG INJ SC/IM: CPT

## 2019-08-28 PROCEDURE — 6360000002 HC RX W HCPCS: Performed by: INTERNAL MEDICINE

## 2019-08-28 PROCEDURE — 93010 ELECTROCARDIOGRAM REPORT: CPT | Performed by: INTERNAL MEDICINE

## 2019-08-28 RX ORDER — MORPHINE SULFATE 15 MG/1
15 TABLET, FILM COATED, EXTENDED RELEASE ORAL 2 TIMES DAILY PRN
Qty: 14 TABLET | Refills: 0 | Status: SHIPPED | OUTPATIENT
Start: 2019-08-28 | End: 2019-09-04

## 2019-08-28 RX ADMIN — FINASTERIDE 5 MG: 5 TABLET, FILM COATED ORAL at 10:01

## 2019-08-28 RX ADMIN — CARBIDOPA AND LEVODOPA 1 TABLET: 25; 100 TABLET ORAL at 06:16

## 2019-08-28 RX ADMIN — CARBIDOPA AND LEVODOPA 1 TABLET: 25; 100 TABLET ORAL at 00:05

## 2019-08-28 RX ADMIN — PREGABALIN 100 MG: 100 CAPSULE ORAL at 10:01

## 2019-08-28 RX ADMIN — LUBIPROSTONE 24 MCG: 24 CAPSULE, GELATIN COATED ORAL at 10:01

## 2019-08-28 RX ADMIN — MORPHINE SULFATE 15 MG: 15 TABLET, FILM COATED, EXTENDED RELEASE ORAL at 10:02

## 2019-08-28 RX ADMIN — PREGABALIN 100 MG: 100 CAPSULE ORAL at 14:00

## 2019-08-28 RX ADMIN — APOMORPHINE HYDROCHLORIDE 0.4 ML: 30 INJECTION SUBCUTANEOUS at 10:03

## 2019-08-28 RX ADMIN — BACLOFEN 10 MG: 10 TABLET ORAL at 10:02

## 2019-08-28 RX ADMIN — CARBIDOPA AND LEVODOPA 1 TABLET: 25; 100 TABLET ORAL at 10:01

## 2019-08-28 RX ADMIN — CARBIDOPA AND LEVODOPA 1 TABLET: 25; 100 TABLET ORAL at 14:00

## 2019-08-28 RX ADMIN — LITHIUM CARBONATE 150 MG: 150 CAPSULE, GELATIN COATED ORAL at 10:02

## 2019-08-28 RX ADMIN — APOMORPHINE HYDROCHLORIDE 0.4 ML: 30 INJECTION SUBCUTANEOUS at 13:59

## 2019-08-28 RX ADMIN — ENOXAPARIN SODIUM 40 MG: 40 INJECTION SUBCUTANEOUS at 10:04

## 2019-08-28 RX ADMIN — ASPIRIN AND DIPYRIDAMOLE 1 CAPSULE: 25; 200 CAPSULE, EXTENDED RELEASE ORAL at 10:01

## 2019-08-28 ASSESSMENT — PAIN SCALES - GENERAL
PAINLEVEL_OUTOF10: 0
PAINLEVEL_OUTOF10: 8

## 2019-08-28 NOTE — CONSULTS
occurs when he is very  anxious. He had a previous history of stroke in 2004 and 2008. We have continued to discuss deep brain stimulator for him. He has not complained of any headache, nausea, vomiting, chest pain,  shortness of breath. He has not complained of any bleeding, bruising,  choking, or drooling. He has not had any falls. PAST MEDICAL HISTORY:  His past medical history is therefore reviewed. He has advanced Parkinson's disease. He is on a high dose of dopamine  agonist and Apokyn, which I follow. He has history of previous two  strokes with left hemiparesis, history of hepatitis C, MI, and  pneumonia. CURRENT MEDICATIONS:  His medications are those of Rytary 43 mg three  times a day and carbidopa and levodopa five times a day. He is on  Apokyn 0.4 mL three to five times a day. He is on Lyrica 100 mg three  times a day and MS Contin 15 mg two times daily. He is on Aggrenox. PHYSICAL EXAMINATION:  GENERAL:  He is having some speech difficulty when he is anxious. He is  able to speak when he is quite _____. NECK:  Supple. There are no meningeal signs. CARDIOVASCULAR SYSTEM:  S1 and S2 are normal.  No murmurs appreciated. No carotid  bruits. RESPIRATORY SYSTEM:  Clear to auscultation. CNS EXAMINATION:  He is awake, alert, and oriented. Pupils are equal  and reactive. Eye movements are conjugate. He is mildly dysarthric. Tongue  is midline. Palate moves symmetrically. EXTREMITIES:  He has left hemiplegia with the strength of 3/5 with the  increased tone. He is hyperreflexic on the left compared to the right. He has a parkinsonian tremor, which I can see. He has cogwheeling  throughout. We did not attempt to walk him. LABORATORY DATA:  Lab examination is therefore reviewed. The patient's  CPK levels are 57 with the sodium of 136, potassium of 4.6, BUN of 9,  creatinine of 0.84. Urine drug screen is positive for opiates. He  takes morphine.   Vitamin B12 of 1520

## 2019-08-28 NOTE — PROGRESS NOTES
Physical Therapy Med Surg Daily Treatment Note  Facility/Department[facility]  [facility]       NAME: Ren Pennington  : 1955 (66 y.o.)  MRN: [id number]  CODE STATUS: Full Code    Date of Service: 2019    Patient Diagnosis(es): Generalized weakness [R53.1]   Chief Complaint   Patient presents with    Other     patient needs clearance to go to nursing home Deanna Avila told him to come to ER to get into [facility] for medication adjustment for Parkinsons     Patient Active Problem List    Diagnosis Date Noted    Generalized weakness 2019    Chronic pain 2019    Generalized muscle ache 2019    Muscle spasticity 2019    Parkinson disease, symptomatic ([address] 2019    MDD (major depressive disorder), recurrent episode ([address] 2019    Severe episode of recurrent major depressive disorder, without psychotic features ([address] 2019    Altered mental status     Urinary retention     HCAP (healthcare-associated pneumonia) 2019    Pancytopenia ([address] 2013    Hep C w/o coma, chronic ([address] 2012    Left hemiplegia ([address] 2012    Osteosarcoma ([address]     Hepatitis C     Cerebral infarction ([address]     MI (myocardial infarction) ([address]     Stroke ([address]     MI (myocardial infarction) ([address]     Leukopenia     Hepatitis C     CAD (coronary artery disease)     Pneumonia     Kidney mass         Past Medical History:   Diagnosis Date    CAD (coronary artery disease)     CVA (cerebral infarction) , 2008    x2, left side weakness    Foot drop, left     Hemiplegia as late effect of cerebrovascular accident ([address]     This is heavily debated. MRI shows small vessel CVD and nothing to suggest significant prior stroke to leave hemiplegia. Neurology has suggested malingering.      Hepatitis C     Hepatitis C 2011    Kidney mass     Leukopenia     MI (myocardial infarction) ([address]     MI (myocardial infarction) ([address]  clean coronaries, no stenting - this is questionable    [facility]     Parkinson disease ([facility]     dr delaney Cagle Pneumonia     Stroke ([facility]     X2     Past Surgical History:   Procedure Laterality Date    BONE RESECTION, RIB      BONY  TUMOR    BONY PELVIS SURGERY  1982    CORONARY ANGIOPLASTY WITH STENT PLACEMENT      X2    CORONARY ANGIOPLASTY WITH STENT PLACEMENT      x2    KIDNEY CYST REMOVAL      benign    KIDNEY SURGERY      TUMOR AND STONE REMOVED          Restrictions:  Restrictions/Precautions: Fall Risk  Position Activity Restriction  Other position/activity restrictions: Pt. has a L KAFO at baseline    SUBJECTIVE:  General  Chart Reviewed: Yes  Family / Caregiver Present: No  Subjective  Subjective: I am feeling closer to normal today. Pre Pain Assessment:  Pre Treatment Pain Screening  Pain at present: 0  Scale Used: Numeric Score  Intervention List: Patient able to continue with treatment  Pain Screening  Patient Currently in Pain: No       Post Pain Assessment:   Pain Assessment  Pain Assessment: 0-10  Pain Level: 0       OBJECTIVE:         Bed mobility  Supine to Sit: Stand by assistance  Sit to Supine: Minimal assistance(assist to lift LLE into bed. )    Transfers  Sit to Stand: Moderate Assistance;2 Person Assistance(lifting assist as well as stabilizing assitance once standing. )  Stand to sit: Moderate Assistance;2 Person Assistance  Bed to Chair: Maximum assistance;2 Person Assistance(face to face pivot back into bed. )    Ambulation  Ambulation?: Yes  Ambulation 1  Surface: level tile  Device: Rolling Walker  Other Apparatus: (KAFO L)  Assistance: Moderate assistance;2 Person assistance;Maximum assistance  Quality of Gait: freezing gait, difficulty advancing LLE. Gait Deviations: Slow Melisa; Shuffles  Distance: 10'   Comments: pt with buckle Max A to maintain upright, chair brought up behind pt, chair pulled back to bed where pt stand pivots back onto bed.

## 2019-08-29 NOTE — PROGRESS NOTES
Physical Therapy  Facility/Department: [facility] [id number]  Physical Therapy Discharge      NAME: Elsy Serra    : 1955 (41 y.o.)  MRN: [id number]    Account: [de-identified]  Gender: male      Patient has been discharged from acute care hospital. DC patient from current PT program.      Electronically signed by Luzma Keller PT on 19 at 4:22 PM

## 2019-09-01 LAB
BLOOD CULTURE, ROUTINE: NORMAL
CULTURE, BLOOD 2: NORMAL

## 2020-03-25 PROBLEM — B19.20 HEPATITIS C: Status: RESOLVED | Noted: 2020-03-25 | Resolved: 2020-03-24

## 2020-03-25 PROBLEM — I21.9 MI (MYOCARDIAL INFARCTION) (HCC): Status: RESOLVED | Noted: 2020-03-25 | Resolved: 2020-03-24

## 2020-09-01 ENCOUNTER — TELEPHONE (OUTPATIENT)
Dept: FAMILY MEDICINE CLINIC | Age: 65
End: 2020-09-01

## 2020-11-03 PROBLEM — J18.9 PNEUMONIA: Status: RESOLVED | Noted: 2020-11-03 | Resolved: 2020-11-03

## 2022-04-29 ENCOUNTER — TELEPHONE (OUTPATIENT)
Dept: PRIMARY CARE CLINIC | Age: 67
End: 2022-04-29

## 2022-06-15 ENCOUNTER — COMMUNITY OUTREACH (OUTPATIENT)
Dept: PRIMARY CARE CLINIC | Age: 67
End: 2022-06-15

## 2022-06-15 NOTE — PROGRESS NOTES
Patient's HM shows they are overdue for Colonoscopy, Care gaps. [employer] and  files searched without success.